# Patient Record
Sex: MALE | Race: WHITE | NOT HISPANIC OR LATINO | Employment: FULL TIME | URBAN - NONMETROPOLITAN AREA
[De-identification: names, ages, dates, MRNs, and addresses within clinical notes are randomized per-mention and may not be internally consistent; named-entity substitution may affect disease eponyms.]

---

## 2017-01-10 PROBLEM — J30.1 ALLERGIC RHINITIS DUE TO POLLEN: Status: ACTIVE | Noted: 2017-01-10

## 2017-01-10 PROBLEM — E55.9 VITAMIN D DEFICIENCY: Status: ACTIVE | Noted: 2017-01-10

## 2017-01-10 PROBLEM — E53.8 VITAMIN B12 DEFICIENCY: Status: ACTIVE | Noted: 2017-01-10

## 2017-01-10 PROBLEM — E78.2 MIXED HYPERLIPIDEMIA: Status: ACTIVE | Noted: 2017-01-10

## 2017-04-26 ENCOUNTER — OFFICE VISIT (OUTPATIENT)
Dept: FAMILY MEDICINE CLINIC | Facility: CLINIC | Age: 47
End: 2017-04-26

## 2017-04-26 VITALS
OXYGEN SATURATION: 100 % | WEIGHT: 132.75 LBS | HEART RATE: 65 BPM | SYSTOLIC BLOOD PRESSURE: 112 MMHG | TEMPERATURE: 97.9 F | DIASTOLIC BLOOD PRESSURE: 77 MMHG | BODY MASS INDEX: 20.18 KG/M2 | RESPIRATION RATE: 16 BRPM

## 2017-04-26 DIAGNOSIS — Z00.00 ROUTINE GENERAL MEDICAL EXAMINATION AT A HEALTH CARE FACILITY: Primary | ICD-10-CM

## 2017-04-26 PROCEDURE — 99396 PREV VISIT EST AGE 40-64: CPT | Performed by: INTERNAL MEDICINE

## 2017-04-26 RX ORDER — LANOLIN ALCOHOL/MO/W.PET/CERES
1000 CREAM (GRAM) TOPICAL DAILY
COMMUNITY
End: 2018-01-26

## 2017-04-26 RX ORDER — LORATADINE 10 MG/1
1 CAPSULE, LIQUID FILLED ORAL DAILY
COMMUNITY

## 2017-04-26 NOTE — PROGRESS NOTES
Subjective   Phani Paulson is a 47 y.o. male.     Chief Complaint   Patient presents with   • Annual Exam       History of Present Illness   HM, Adult Male: The patient is being seen for a health maintenance evaluation and physical exam . The last health maintenance visit was 1 year ago.   General Health: The patient's health is described as good. He denies vision problems. He denies hearing loss. Immunizations status: up to date.   Lifestyle:. He consumes a diverse and healthy diet.   Screening: Cancer screening reviewed and current.   Metabolic screening reviewed and current.   Risk screening reviewed and current.   HPI: Patient is here for a physical exam. He is up-to-date on his immunization. He is up-to-date on his colonoscopy and is due for it this year - see dr lee . He is due for lab work     Review of Systems   Constitutional: Negative for appetite change, fatigue and fever.   HENT: Negative for congestion, ear discharge, ear pain, sinus pressure and sore throat.    Eyes: Negative for pain and discharge.   Respiratory: Negative for cough, shortness of breath and wheezing.    Cardiovascular: Negative for chest pain, palpitations and leg swelling.   Gastrointestinal: Negative for abdominal pain, constipation, diarrhea, nausea and vomiting.   Endocrine: Negative for cold intolerance and heat intolerance.   Genitourinary: Negative for dysuria and flank pain.   Musculoskeletal: Negative for arthralgias and joint swelling.   Skin: Negative for pallor and rash.   Allergic/Immunologic: Negative for environmental allergies and food allergies.   Neurological: Negative for dizziness, weakness and numbness.   Hematological: Negative for adenopathy. Does not bruise/bleed easily.   Psychiatric/Behavioral: Negative for behavioral problems and dysphoric mood. The patient is not nervous/anxious.        Past Medical History:   Diagnosis Date   • Acute maxillary sinusitis    • Acute suppurative otitis media of both ears  without spontaneous rupture of tympanic membranes    • Allergy    • Lesion of skin of face    • Neoplasm of uncertain behavior of skin    • Post varicella        Past Surgical History:   Procedure Laterality Date   • COLONOSCOPY         Family History   Problem Relation Age of Onset   • Colon cancer Mother    • Hypertension Mother    • Skin cancer Father         reports that he has never smoked. He does not have any smokeless tobacco history on file. He reports that he does not drink alcohol.    No Known Allergies        Current Outpatient Prescriptions:   •  Loratadine 10 MG capsule, Take 1 capsule by mouth Daily., Disp: , Rfl:   •  vitamin B-12 (CYANOCOBALAMIN) 1000 MCG tablet, Take 1,000 mcg by mouth Daily., Disp: , Rfl:       Objective   Blood pressure 112/77, pulse 65, temperature 97.9 °F (36.6 °C), temperature source Oral, resp. rate 16, weight 132 lb 12 oz (60.2 kg), SpO2 100 %.    Physical Exam   Constitutional: He is oriented to person, place, and time. He appears well-developed and well-nourished. No distress.   HENT:   Head: Normocephalic and atraumatic.   Right Ear: External ear normal.   Left Ear: External ear normal.   Nose: Nose normal.   Mouth/Throat: Oropharynx is clear and moist.   Eyes: Conjunctivae and EOM are normal. Pupils are equal, round, and reactive to light.   Neck: Normal range of motion. Neck supple. No thyromegaly present.   Cardiovascular: Normal rate, regular rhythm and normal heart sounds.    Pulmonary/Chest: Effort normal. No respiratory distress.   Abdominal: Soft. He exhibits no distension. There is no tenderness. There is no guarding.   Musculoskeletal: Normal range of motion. He exhibits no edema.   Lymphadenopathy:     He has no cervical adenopathy.   Neurological: He is alert and oriented to person, place, and time.   No gross motor or sensory deficits   Skin: Skin is warm and dry. He is not diaphoretic. No erythema.   Psychiatric: He has a normal mood and affect.   Nursing  note and vitals reviewed.        Results for orders placed or performed during the hospital encounter of 04/02/15   Vitamin B12   Result Value Ref Range    Vitamin B-12 944 (H) 239 - 931 pg/mL   Vitamin D 25 hydroxy   Result Value Ref Range    25 Hydroxy, Vitamin D 15.9 (L) 30.0 - 100.0 ng/mL   CK   Result Value Ref Range    Creatine Kinase 50 30 - 170 U/L   Comprehensive metabolic panel   Result Value Ref Range    Sodium 146 (H) 137 - 145 mmol/L    Potassium 4.6 3.5 - 5.1 mmol/L    Chloride 106 98 - 107 mmol/L    CO2 28 26 - 30 mmol/L    Anion Gap 17 10 - 20 mmol/L    BUN 17 7 - 20 mg/dL    Creatinine 1.0 0.6 - 1.3 mg/dL    BUN/Creatinine Ratio 17.3 6.3 - 21.9    eGFR >60 mL/min    Glucose 92 74 - 98 mg/dL    Calcium 9.8 8.4 - 10.2 mg/dL    Total Bilirubin 0.8 0.2 - 1.3 mg/dL    AST (SGOT) 22 15 - 46 U/L    ALT (SGPT) 16 13 - 69 U/L    Total Protein 8.6 (H) 6.3 - 8.2 g/dL    Albumin 5.2 (H) 3.5 - 5.0 g/dL    A/G Ratio 1.5 1.0 - 2.0    Alkaline Phosphatase 74 38 - 126 U/L   Lipid panel   Result Value Ref Range    Total Cholesterol 204 (H) 0 - 199 mg/dL    Triglycerides 53 0 - 149 mg/dL    HDL Cholesterol 57 40 - 60 mg/dL    LDL Cholesterol  136 (H) 0 - 99 mg/dL    VLDL Cholesterol 11 mg/dL   CBC and Differential   Result Value Ref Range    WBC 6.6 4.8 - 10.8 THOUS    RBC 5.97 4.70 - 6.10 m/uL    Hemoglobin 17.4 14.0 - 18.0 g/dL    Hematocrit 52 42 - 52 %    MCV 87.4 80.0 - 94.0 fL    MCH 29.1 27.0 - 31.0 uug    MCHC 33.4 30.0 - 37.0 g/dL    RDW 12.1 11.5 - 14.5 %    Platelets 328 130 - 400 THOUS    Neutrophil Rel % 69.5 37.0 - 80.0 %    Lymphocyte Rel % 16.7 10.0 - 50.0 %    Monocyte Rel % 9.0 0.0 - 12.0 %    Eosinophil Rel % 3.2 0.0 - 7.0 %    Basophil Rel % 1.60 0.00 - 2.50 %    Neutrophils Absolute 4.60 2.00 - 6.90 THOUS    Lymphocytes Absolute 1.10 0.60 - 3.40 THOUS    Monocytes Absolute 0.60 0.00 - 0.90 THOUS    Eosinophils Absolute 0.20 0.00 - 0.70 THOUS    Basophils Absolute 0.10 0.00 - 0.20 THOUS   TSH    Result Value Ref Range    TSH 1.17 0.47 - 4.68 uIU/mL         Assessment/Plan   Phani was seen today for annual exam.    Diagnoses and all orders for this visit:    Routine general medical examination at a health care facility  -     CBC & Differential  -     Comprehensive Metabolic Panel  -     Lipid Panel  -     Hemoglobin A1c  -     TSH  -     Vitamin B12  -     Vitamin D 25 Hydroxy      1. physical exam : conducted, will obtain fasting labs   Impression: health maintenance visit, healthy adult male. Currently, he eats a healthy diet. Colorectal cancer screening: colorectal cancer screening is current and colorectal cancer screening is managed by dr lee. Screening lab work includes glucose, lipid profile and 25-hydroxyvitamin D. The risks and benefits of immunizations were discussed and immunizations are up to date. Advice and education were given regarding nutrition and aerobic exercise. Patient discussion: discussed with the patient.            Annabelle Jarvis MD

## 2017-05-19 LAB
25(OH)D3+25(OH)D2 SERPL-MCNC: 22.5 NG/ML
ALBUMIN SERPL-MCNC: 4.3 G/DL (ref 3.5–5)
ALBUMIN/GLOB SERPL: 1.6 G/DL (ref 1–2)
ALP SERPL-CCNC: 63 U/L (ref 38–126)
ALT SERPL-CCNC: 24 U/L (ref 13–69)
AST SERPL-CCNC: 19 U/L (ref 15–46)
BASOPHILS # BLD AUTO: 0.09 10*3/MM3 (ref 0–0.2)
BASOPHILS NFR BLD AUTO: 1.5 % (ref 0–2.5)
BILIRUB SERPL-MCNC: 0.9 MG/DL (ref 0.2–1.3)
BUN SERPL-MCNC: 14 MG/DL (ref 7–20)
BUN/CREAT SERPL: 14 (ref 6.3–21.9)
CALCIUM SERPL-MCNC: 9.7 MG/DL (ref 8.4–10.2)
CHLORIDE SERPL-SCNC: 106 MMOL/L (ref 98–107)
CHOLEST SERPL-MCNC: 170 MG/DL (ref 0–199)
CO2 SERPL-SCNC: 25 MMOL/L (ref 26–30)
CREAT SERPL-MCNC: 1 MG/DL (ref 0.6–1.3)
EOSINOPHIL # BLD AUTO: 0.2 10*3/MM3 (ref 0–0.7)
EOSINOPHIL NFR BLD AUTO: 3.3 % (ref 0–7)
ERYTHROCYTE [DISTWIDTH] IN BLOOD BY AUTOMATED COUNT: 12.3 % (ref 11.5–14.5)
GLOBULIN SER CALC-MCNC: 2.7 GM/DL
GLUCOSE SERPL-MCNC: 94 MG/DL (ref 74–98)
HBA1C MFR BLD: 4.8 %
HCT VFR BLD AUTO: 48.9 % (ref 42–52)
HDLC SERPL-MCNC: 55 MG/DL (ref 40–60)
HGB BLD-MCNC: 16.7 G/DL (ref 14–18)
IMM GRANULOCYTES # BLD: 0.02 10*3/MM3 (ref 0–0.06)
IMM GRANULOCYTES NFR BLD: 0.3 % (ref 0–0.6)
LDLC SERPL CALC-MCNC: 102 MG/DL (ref 0–99)
LYMPHOCYTES # BLD AUTO: 1.12 10*3/MM3 (ref 0.6–3.4)
LYMPHOCYTES NFR BLD AUTO: 18.3 % (ref 10–50)
MCH RBC QN AUTO: 30 PG (ref 27–31)
MCHC RBC AUTO-ENTMCNC: 34.2 G/DL (ref 30–37)
MCV RBC AUTO: 87.9 FL (ref 80–94)
MONOCYTES # BLD AUTO: 0.65 10*3/MM3 (ref 0–0.9)
MONOCYTES NFR BLD AUTO: 10.6 % (ref 0–12)
NEUTROPHILS # BLD AUTO: 4.03 10*3/MM3 (ref 2–6.9)
NEUTROPHILS NFR BLD AUTO: 66 % (ref 37–80)
NRBC BLD AUTO-RTO: 0 /100 WBC (ref 0–0)
PLATELET # BLD AUTO: 305 10*3/MM3 (ref 130–400)
POTASSIUM SERPL-SCNC: 4.3 MMOL/L (ref 3.5–5.1)
PROT SERPL-MCNC: 7 G/DL (ref 6.3–8.2)
RBC # BLD AUTO: 5.56 10*6/MM3 (ref 4.7–6.1)
SODIUM SERPL-SCNC: 141 MMOL/L (ref 137–145)
TRIGL SERPL-MCNC: 67 MG/DL
TSH SERPL DL<=0.005 MIU/L-ACNC: 1.03 MIU/ML (ref 0.47–4.68)
VIT B12 SERPL-MCNC: 942 PG/ML (ref 239–931)
VLDLC SERPL CALC-MCNC: 13.4 MG/DL
WBC # BLD AUTO: 6.11 10*3/MM3 (ref 4.8–10.8)

## 2017-05-23 RX ORDER — ERGOCALCIFEROL 1.25 MG/1
50000 CAPSULE ORAL WEEKLY
Qty: 8 CAPSULE | Refills: 0 | Status: SHIPPED | OUTPATIENT
Start: 2017-05-23 | End: 2017-07-12

## 2017-10-05 ENCOUNTER — TELEPHONE (OUTPATIENT)
Dept: FAMILY MEDICINE CLINIC | Facility: CLINIC | Age: 47
End: 2017-10-05

## 2017-10-05 RX ORDER — CEPHALEXIN 500 MG/1
500 CAPSULE ORAL 3 TIMES DAILY
Qty: 30 CAPSULE | Refills: 0 | Status: SHIPPED | OUTPATIENT
Start: 2017-10-05 | End: 2017-10-15

## 2017-11-10 ENCOUNTER — TELEPHONE (OUTPATIENT)
Dept: GASTROENTEROLOGY | Facility: CLINIC | Age: 47
End: 2017-11-10

## 2017-11-16 ENCOUNTER — LAB REQUISITION (OUTPATIENT)
Dept: LAB | Facility: HOSPITAL | Age: 47
End: 2017-11-16

## 2017-11-16 ENCOUNTER — OUTSIDE FACILITY SERVICE (OUTPATIENT)
Dept: GASTROENTEROLOGY | Facility: CLINIC | Age: 47
End: 2017-11-16

## 2017-11-16 DIAGNOSIS — Z80.0 FAMILY HISTORY OF MALIGNANT NEOPLASM OF DIGESTIVE ORGAN: ICD-10-CM

## 2017-11-16 PROCEDURE — 45385 COLONOSCOPY W/LESION REMOVAL: CPT | Performed by: INTERNAL MEDICINE

## 2017-11-16 PROCEDURE — 88305 TISSUE EXAM BY PATHOLOGIST: CPT | Performed by: INTERNAL MEDICINE

## 2017-11-17 LAB
CYTO UR: NORMAL
LAB AP CASE REPORT: NORMAL
LAB AP CLINICAL INFORMATION: NORMAL
Lab: NORMAL
PATH REPORT.FINAL DX SPEC: NORMAL
PATH REPORT.GROSS SPEC: NORMAL

## 2018-01-26 ENCOUNTER — OFFICE VISIT (OUTPATIENT)
Dept: INTERNAL MEDICINE | Facility: CLINIC | Age: 48
End: 2018-01-26

## 2018-01-26 VITALS
HEART RATE: 86 BPM | SYSTOLIC BLOOD PRESSURE: 110 MMHG | OXYGEN SATURATION: 98 % | DIASTOLIC BLOOD PRESSURE: 68 MMHG | HEIGHT: 66 IN | TEMPERATURE: 98.7 F | BODY MASS INDEX: 21.69 KG/M2 | WEIGHT: 135 LBS

## 2018-01-26 DIAGNOSIS — J10.1 INFLUENZA A: Primary | ICD-10-CM

## 2018-01-26 LAB
EXPIRATION DATE: ABNORMAL
FLUAV AG NPH QL: POSITIVE
FLUBV AG NPH QL: NEGATIVE
INTERNAL CONTROL: ABNORMAL
Lab: ABNORMAL

## 2018-01-26 PROCEDURE — 87804 INFLUENZA ASSAY W/OPTIC: CPT | Performed by: PHYSICIAN ASSISTANT

## 2018-01-26 PROCEDURE — 99213 OFFICE O/P EST LOW 20 MIN: CPT | Performed by: PHYSICIAN ASSISTANT

## 2018-01-26 RX ORDER — OSELTAMIVIR PHOSPHATE 75 MG/1
75 CAPSULE ORAL 2 TIMES DAILY
Qty: 10 CAPSULE | Refills: 0 | Status: SHIPPED | OUTPATIENT
Start: 2018-01-26 | End: 2018-01-31

## 2018-01-26 NOTE — PROGRESS NOTES
Phani Paulson is a 47 y.o. male.     Subjective   History of Present Illness   Here today with concern of about 24 hours of nasal congestion, postnasal drip, rhinorrhea, cough, chills, sweats, headache, ear ache and sore throat.  He is unsure if he had a fever. Denies body aches or SOA. Advil helps his headaches some. He had an influenza vaccine this year.         The following portions of the patient's history were reviewed and updated as appropriate: allergies, current medications, past family history, past medical history, past social history, past surgical history and problem list.    Review of Systems    Constitutional: chills, sweats. Negative for appetite change, fatigue, fever and unexpected weight change.   HENT: postnasal drip, rhinorrhea, sore throat, congestion, ear pain.  Negative for hearing loss, nosebleeds, tinnitus and trouble swallowing.    Eyes: Negative for photophobia, discharge and visual disturbance.   Respiratory: cough. Negative for chest tightness, shortness of breath and wheezing.    Cardiovascular: Negative for chest pain, palpitations and leg swelling.   Gastrointestinal: Negative for abdominal distention, abdominal pain, blood in stool, constipation, diarrhea, nausea and vomiting.   Endocrine: Negative for cold intolerance, heat intolerance, polydipsia, polyphagia and polyuria.   Musculoskeletal: Negative for arthralgias, back pain, joint swelling, myalgias, neck pain and neck stiffness.   Skin: Negative for color change, pallor, rash and wound.   Allergic/Immunologic: Negative for environmental allergies, food allergies and immunocompromised state.   Neurological: headaches. Negative for dizziness, tremors, seizures, weakness, numbness.  Hematological: Negative for adenopathy. Does not bruise/bleed easily.   Psychiatric/Behavioral: Negative for sleep disturbances, agitation, behavioral problems, confusion, hallucinations, self-injury and suicidal ideas. The patient is not  "nervous/anxious.      Objective    Physical Exam  Constitutional: Oriented to person, place, and time. Appears well-developed and well-nourished.   HENT: OP normal. TMs normal.   Head: little maxillary and frontal sinus pressure. Normocephalic and atraumatic.   Eyes: EOM are normal. Pupils are equal, round, and reactive to light.   Neck: Normal range of motion. Neck supple.   Cardiovascular: Normal rate, regular rhythm and normal heart sounds.    Pulmonary/Chest: Effort normal and breath sounds normal. No respiratory distress.  Has no wheezes or rales. Exhibits no chest wall tenderness.   Abdominal: Soft. Bowel sounds are normal. Exhibits no distension and no mass. There is no tenderness.   Musculoskeletal: Normal range of motion. Exhibits no tenderness.   Neurological: Alert and oriented to person, place, and time.   Skin: Skin is warm and dry.   Psychiatric: Has a normal mood and affect. Behavior is normal. Judgment and thought content normal.       /68  Pulse 86  Temp 98.7 °F (37.1 °C)  Ht 167.6 cm (66\")  Wt 61.2 kg (135 lb)  SpO2 98%  BMI 21.79 kg/m2    Nursing note and vitals reviewed.        Assessment/Plan   Phani was seen today for sinus problem, cough, earache, headache and sore throat.    Diagnoses and all orders for this visit:    Influenza A  -     POC Influenza A / B- positive A  -     oseltamivir (TAMIFLU) 75 MG capsule; Take 1 capsule by mouth 2 (Two) Times a Day for 5 days.    Increase PO fluid intake. Ibuprofen and/or tylenol prn.     Call or RTC if symptoms worsen or persist.          "

## 2018-05-02 ENCOUNTER — OFFICE VISIT (OUTPATIENT)
Dept: INTERNAL MEDICINE | Facility: CLINIC | Age: 48
End: 2018-05-02

## 2018-05-02 VITALS
HEART RATE: 60 BPM | SYSTOLIC BLOOD PRESSURE: 121 MMHG | TEMPERATURE: 98.3 F | DIASTOLIC BLOOD PRESSURE: 84 MMHG | RESPIRATION RATE: 15 BRPM | WEIGHT: 132 LBS | BODY MASS INDEX: 21.31 KG/M2 | OXYGEN SATURATION: 100 %

## 2018-05-02 DIAGNOSIS — Z00.00 ROUTINE GENERAL MEDICAL EXAMINATION AT A HEALTH CARE FACILITY: Primary | ICD-10-CM

## 2018-05-02 PROCEDURE — 99396 PREV VISIT EST AGE 40-64: CPT | Performed by: INTERNAL MEDICINE

## 2018-05-02 NOTE — PROGRESS NOTES
Subjective   Phani Paulson is a 48 y.o. male.     Chief Complaint   Patient presents with   • Annual Exam       History of Present Illness    patient is here for a physical and is due for labs , he was seen by GI and has had a colonoscopy     Review of Systems   Constitutional: Negative for appetite change, fatigue and fever.   HENT: Negative for congestion, ear discharge, ear pain, sinus pressure and sore throat.    Eyes: Negative for pain and discharge.   Respiratory: Negative for cough, shortness of breath and wheezing.    Cardiovascular: Negative for chest pain, palpitations and leg swelling.   Gastrointestinal: Negative for abdominal pain, constipation, diarrhea, nausea and vomiting.   Endocrine: Negative for cold intolerance and heat intolerance.   Genitourinary: Negative for dysuria and flank pain.   Musculoskeletal: Negative for arthralgias and joint swelling.   Skin: Negative for pallor and rash.   Allergic/Immunologic: Negative for environmental allergies and food allergies.   Neurological: Negative for dizziness, weakness and numbness.   Hematological: Negative for adenopathy. Does not bruise/bleed easily.   Psychiatric/Behavioral: Negative for behavioral problems and dysphoric mood. The patient is not nervous/anxious.        Past Medical History:   Diagnosis Date   • Acute maxillary sinusitis    • Acute suppurative otitis media of both ears without spontaneous rupture of tympanic membranes    • Allergy    • Lesion of skin of face    • Neoplasm of uncertain behavior of skin    • Post varicella        Past Surgical History:   Procedure Laterality Date   • COLONOSCOPY         Family History   Problem Relation Age of Onset   • Colon cancer Mother    • Hypertension Mother    • Skin cancer Father         reports that he has never smoked. He does not have any smokeless tobacco history on file. He reports that he does not drink alcohol.    No Known Allergies        Current Outpatient Prescriptions:    •  Loratadine 10 MG capsule, Take 1 capsule by mouth Daily., Disp: , Rfl:       Objective   Blood pressure 121/84, pulse 60, temperature 98.3 °F (36.8 °C), temperature source Oral, resp. rate 15, weight 59.9 kg (132 lb), SpO2 100 %.    Physical Exam   Constitutional: He is oriented to person, place, and time. He appears well-developed and well-nourished. No distress.   HENT:   Head: Normocephalic and atraumatic.   Right Ear: External ear normal.   Left Ear: External ear normal.   Nose: Nose normal.   Mouth/Throat: Oropharynx is clear and moist.   Eyes: Conjunctivae and EOM are normal. Pupils are equal, round, and reactive to light.   Neck: Normal range of motion. Neck supple. No thyromegaly present.   Cardiovascular: Normal rate, regular rhythm and normal heart sounds.    Pulmonary/Chest: Effort normal. No respiratory distress.   Abdominal: Soft. He exhibits no distension. There is no tenderness. There is no guarding.   Musculoskeletal: Normal range of motion. He exhibits no edema.   Lymphadenopathy:     He has no cervical adenopathy.   Neurological: He is alert and oriented to person, place, and time.   No gross motor or sensory deficits   Skin: Skin is warm and dry. He is not diaphoretic. No erythema.   Psychiatric: He has a normal mood and affect.   Nursing note and vitals reviewed.        Results for orders placed or performed in visit on 01/26/18   POC Influenza A / B   Result Value Ref Range    Rapid Influenza A Ag positive     Rapid Influenza B Ag negative     Internal Control Passed Passed    Lot Number [id number]     Expiration Date 9/30/19          Assessment/Plan   Phani was seen today for annual exam.    Diagnoses and all orders for this visit:    Routine general medical examination at a health care facility  -     CBC & Differential  -     Lipid Panel  -     Comprehensive Metabolic Panel  -     Hemoglobin A1c  -     Vitamin B12  -     Vitamin D 25 Hydroxy        Plan:  1.physical: Physical exam  conducted today,  obtain fasting lab work,    Currently,  patient  eats a healthy diet. Screening lab work includes glucose, lipid profile and 25-hydroxyvitamin D. The risks and benefits of immunizations were discussed and immunizations are up to date. Advice and education were given regarding nutrition and aerobic exercise. Patient discussion: discussed with the patient.   Medication Reviewed and medication list updated         Annabelle Jarvis MD

## 2018-05-11 LAB
25(OH)D3+25(OH)D2 SERPL-MCNC: 18.1 NG/ML
ALBUMIN SERPL-MCNC: 4.4 G/DL (ref 3.5–5)
ALBUMIN/GLOB SERPL: 1.5 G/DL (ref 1–2)
ALP SERPL-CCNC: 77 U/L (ref 38–126)
ALT SERPL-CCNC: 17 U/L (ref 13–69)
AST SERPL-CCNC: 22 U/L (ref 15–46)
BASOPHILS # BLD AUTO: 0.12 10*3/MM3 (ref 0–0.2)
BASOPHILS NFR BLD AUTO: 2.1 % (ref 0–2.5)
BILIRUB SERPL-MCNC: 0.7 MG/DL (ref 0.2–1.3)
BUN SERPL-MCNC: 17 MG/DL (ref 7–20)
BUN/CREAT SERPL: 17 (ref 6.3–21.9)
CALCIUM SERPL-MCNC: 9.6 MG/DL (ref 8.4–10.2)
CHLORIDE SERPL-SCNC: 105 MMOL/L (ref 98–107)
CHOLEST SERPL-MCNC: 161 MG/DL (ref 0–199)
CO2 SERPL-SCNC: 27 MMOL/L (ref 26–30)
CREAT SERPL-MCNC: 1 MG/DL (ref 0.6–1.3)
EOSINOPHIL # BLD AUTO: 0.47 10*3/MM3 (ref 0–0.7)
EOSINOPHIL NFR BLD AUTO: 8.4 % (ref 0–7)
ERYTHROCYTE [DISTWIDTH] IN BLOOD BY AUTOMATED COUNT: 12.6 % (ref 11.5–14.5)
GFR SERPLBLD CREATININE-BSD FMLA CKD-EPI: 80 ML/MIN/1.73
GFR SERPLBLD CREATININE-BSD FMLA CKD-EPI: 97 ML/MIN/1.73
GLOBULIN SER CALC-MCNC: 3 GM/DL
GLUCOSE SERPL-MCNC: 90 MG/DL (ref 74–98)
HBA1C MFR BLD: 5 %
HCT VFR BLD AUTO: 50.1 % (ref 42–52)
HDLC SERPL-MCNC: 54 MG/DL (ref 40–60)
HGB BLD-MCNC: 17 G/DL (ref 14–18)
IMM GRANULOCYTES # BLD: 0.02 10*3/MM3 (ref 0–0.06)
IMM GRANULOCYTES NFR BLD: 0.4 % (ref 0–0.6)
LDLC SERPL CALC-MCNC: 91 MG/DL (ref 0–99)
LYMPHOCYTES # BLD AUTO: 1.16 10*3/MM3 (ref 0.6–3.4)
LYMPHOCYTES NFR BLD AUTO: 20.7 % (ref 10–50)
MCH RBC QN AUTO: 30.2 PG (ref 27–31)
MCHC RBC AUTO-ENTMCNC: 33.9 G/DL (ref 30–37)
MCV RBC AUTO: 89 FL (ref 80–94)
MONOCYTES # BLD AUTO: 0.56 10*3/MM3 (ref 0–0.9)
MONOCYTES NFR BLD AUTO: 10 % (ref 0–12)
NEUTROPHILS # BLD AUTO: 3.28 10*3/MM3 (ref 2–6.9)
NEUTROPHILS NFR BLD AUTO: 58.4 % (ref 37–80)
NRBC BLD AUTO-RTO: 0 /100 WBC (ref 0–0)
PLATELET # BLD AUTO: 300 10*3/MM3 (ref 130–400)
POTASSIUM SERPL-SCNC: 4.9 MMOL/L (ref 3.5–5.1)
PROT SERPL-MCNC: 7.4 G/DL (ref 6.3–8.2)
RBC # BLD AUTO: 5.63 10*6/MM3 (ref 4.7–6.1)
SODIUM SERPL-SCNC: 142 MMOL/L (ref 137–145)
TRIGL SERPL-MCNC: 82 MG/DL
VIT B12 SERPL-MCNC: 373 PG/ML (ref 239–931)
VLDLC SERPL CALC-MCNC: 16.4 MG/DL
WBC # BLD AUTO: 5.61 10*3/MM3 (ref 4.8–10.8)

## 2018-05-14 RX ORDER — ERGOCALCIFEROL 1.25 MG/1
50000 CAPSULE ORAL WEEKLY
Qty: 8 CAPSULE | Refills: 0 | Status: SHIPPED | OUTPATIENT
Start: 2018-05-14 | End: 2019-05-06

## 2018-08-13 RX ORDER — ERGOCALCIFEROL 1.25 MG/1
CAPSULE ORAL
Qty: 4 CAPSULE | Refills: 0 | OUTPATIENT
Start: 2018-08-13

## 2019-05-06 ENCOUNTER — OFFICE VISIT (OUTPATIENT)
Dept: INTERNAL MEDICINE | Facility: CLINIC | Age: 49
End: 2019-05-06

## 2019-05-06 VITALS
OXYGEN SATURATION: 98 % | WEIGHT: 135 LBS | TEMPERATURE: 98 F | BODY MASS INDEX: 21.69 KG/M2 | DIASTOLIC BLOOD PRESSURE: 88 MMHG | RESPIRATION RATE: 16 BRPM | HEART RATE: 65 BPM | SYSTOLIC BLOOD PRESSURE: 133 MMHG | HEIGHT: 66 IN

## 2019-05-06 DIAGNOSIS — Z00.00 ROUTINE GENERAL MEDICAL EXAMINATION AT A HEALTH CARE FACILITY: Primary | ICD-10-CM

## 2019-05-06 PROCEDURE — 99396 PREV VISIT EST AGE 40-64: CPT | Performed by: INTERNAL MEDICINE

## 2019-05-06 RX ORDER — MELATONIN
1000 DAILY
COMMUNITY
End: 2020-01-25

## 2019-05-06 NOTE — PROGRESS NOTES
Subjective   Phani Paulson is a 49 y.o. male.     Chief Complaint   Patient presents with   • Annual Exam       History of Present Illness   Patient is here for a physical and due for labs , he got hep a shot - first dose     Review of Systems   Constitutional: Negative for appetite change, fatigue and fever.   HENT: Negative for congestion, ear discharge, ear pain, sinus pressure and sore throat.    Eyes: Negative for pain and discharge.   Respiratory: Negative for cough, shortness of breath and wheezing.    Cardiovascular: Negative for chest pain, palpitations and leg swelling.   Gastrointestinal: Negative for abdominal pain, constipation, diarrhea, nausea and vomiting.   Endocrine: Negative for cold intolerance and heat intolerance.   Genitourinary: Negative for dysuria and flank pain.   Musculoskeletal: Negative for arthralgias and joint swelling.   Skin: Negative for pallor and rash.   Allergic/Immunologic: Negative for environmental allergies and food allergies.   Neurological: Negative for dizziness, weakness and numbness.   Hematological: Negative for adenopathy. Does not bruise/bleed easily.   Psychiatric/Behavioral: Negative for behavioral problems and dysphoric mood. The patient is not nervous/anxious.        Past Medical History:   Diagnosis Date   • Acute maxillary sinusitis    • Acute suppurative otitis media of both ears without spontaneous rupture of tympanic membranes    • Allergy    • Lesion of skin of face    • Neoplasm of uncertain behavior of skin    • Post varicella        Past Surgical History:   Procedure Laterality Date   • COLONOSCOPY         Family History   Problem Relation Age of Onset   • Colon cancer Mother    • Hypertension Mother    • Skin cancer Father         reports that he has never smoked. He does not have any smokeless tobacco history on file. He reports that he does not drink alcohol.    No Known Allergies        Current Outpatient Medications:   •  cholecalciferol  "(VITAMIN D3) 1000 units tablet, Take 1,000 Units by mouth Daily., Disp: , Rfl:   •  Loratadine 10 MG capsule, Take 1 capsule by mouth Daily., Disp: , Rfl:       Objective   Blood pressure 133/88, pulse 65, temperature 98 °F (36.7 °C), resp. rate 16, height 167.6 cm (66\"), weight 61.2 kg (135 lb), SpO2 98 %.    Physical Exam   Constitutional: He is oriented to person, place, and time. He appears well-developed and well-nourished. No distress.   HENT:   Head: Normocephalic and atraumatic.   Right Ear: External ear normal.   Left Ear: External ear normal.   Nose: Nose normal.   Mouth/Throat: Oropharynx is clear and moist.   Eyes: Conjunctivae and EOM are normal. Pupils are equal, round, and reactive to light.   Neck: Normal range of motion. Neck supple. No thyromegaly present.   Cardiovascular: Normal rate, regular rhythm and normal heart sounds.   Pulmonary/Chest: Effort normal. No respiratory distress.   Abdominal: Soft. He exhibits no distension. There is no tenderness. There is no guarding.   Musculoskeletal: Normal range of motion. He exhibits no edema.   Lymphadenopathy:     He has no cervical adenopathy.   Neurological: He is alert and oriented to person, place, and time.   No gross motor or sensory deficits   Skin: Skin is warm and dry. He is not diaphoretic. No erythema.   Psychiatric: He has a normal mood and affect.   Nursing note and vitals reviewed.      Patient's Body mass index is 21.79 kg/m². BMI is within normal parameters. No follow-up required..      Results for orders placed or performed in visit on 05/02/18   Lipid Panel   Result Value Ref Range    Total Cholesterol 161 0 - 199 mg/dL    Triglycerides 82 <150 mg/dL    HDL Cholesterol 54 40 - 60 mg/dL    VLDL Cholesterol 16.4 mg/dL    LDL Cholesterol  91 0 - 99 mg/dL   Comprehensive Metabolic Panel   Result Value Ref Range    Glucose 90 74 - 98 mg/dL    BUN 17 7 - 20 mg/dL    Creatinine 1.00 0.60 - 1.30 mg/dL    eGFR Non African Am 80 >60 mL/min/1.73 "    eGFR African Am 97 >60 mL/min/1.73    BUN/Creatinine Ratio 17.0 6.3 - 21.9    Sodium 142 137 - 145 mmol/L    Potassium 4.9 3.5 - 5.1 mmol/L    Chloride 105 98 - 107 mmol/L    Total CO2 27.0 26.0 - 30.0 mmol/L    Calcium 9.6 8.4 - 10.2 mg/dL    Total Protein 7.4 6.3 - 8.2 g/dL    Albumin 4.40 3.50 - 5.00 g/dL    Globulin 3.0 gm/dL    A/G Ratio 1.5 1.0 - 2.0 g/dL    Total Bilirubin 0.7 0.2 - 1.3 mg/dL    Alkaline Phosphatase 77 38 - 126 U/L    AST (SGOT) 22 15 - 46 U/L    ALT (SGPT) 17 13 - 69 U/L   Hemoglobin A1c   Result Value Ref Range    Hemoglobin A1C 5.00 %   Vitamin B12   Result Value Ref Range    Vitamin B-12 373 239 - 931 pg/mL   Vitamin D 25 Hydroxy   Result Value Ref Range    25 Hydroxy, Vitamin D 18.1 ng/ml   CBC & Differential   Result Value Ref Range    WBC 5.61 4.80 - 10.80 10*3/mm3    RBC 5.63 4.70 - 6.10 10*6/mm3    Hemoglobin 17.0 14.0 - 18.0 g/dL    Hematocrit 50.1 42.0 - 52.0 %    MCV 89.0 80.0 - 94.0 fL    MCH 30.2 27.0 - 31.0 pg    MCHC 33.9 30.0 - 37.0 g/dL    RDW 12.6 11.5 - 14.5 %    Platelets 300 130 - 400 10*3/mm3    Neutrophil Rel % 58.4 37.0 - 80.0 %    Lymphocyte Rel % 20.7 10.0 - 50.0 %    Monocyte Rel % 10.0 0.0 - 12.0 %    Eosinophil Rel % 8.4 (H) 0.0 - 7.0 %    Basophil Rel % 2.1 0.0 - 2.5 %    Neutrophils Absolute 3.28 2.00 - 6.90 10*3/mm3    Lymphocytes Absolute 1.16 0.60 - 3.40 10*3/mm3    Monocytes Absolute 0.56 0.00 - 0.90 10*3/mm3    Eosinophils Absolute 0.47 0.00 - 0.70 10*3/mm3    Basophils Absolute 0.12 0.00 - 0.20 10*3/mm3    Immature Granulocyte Rel % 0.4 0.0 - 0.6 %    Immature Grans Absolute 0.02 0.00 - 0.06 10*3/mm3    nRBC 0.0 0.0 - 0.0 /100 WBC         Assessment/Plan   Phani was seen today for annual exam.    Diagnoses and all orders for this visit:    Routine general medical examination at a health care facility  -     CBC & Differential  -     Comprehensive Metabolic Panel  -     Lipid Panel  -     Vitamin B12  -     Vitamin D 25 Hydroxy  -     Hemoglobin  A1c        plan:  1.physical: Physical exam conducted today,  Will obtain fasting lab work,   Impression: healthy adult Patient. Currently, patient eats a healthy diet. Screening lab work includes glucose, lipid profile , complete blood count and comprehensive panel . The risks and benefits of immunizations were discussed and immunizations are up to date. Advice and education were given regarding nutrition and aerobic exercise. Patient discussion: discussed with the patient.  Physical with preventive exam as well as age and risk appropriate counseling completed.   Patient Discussion: plan discussed with the patient, follow-up visit needed in one year. Medication Review and medication list updated           Annabelle Jarvis MD

## 2019-08-30 ENCOUNTER — APPOINTMENT (OUTPATIENT)
Dept: LAB | Facility: HOSPITAL | Age: 49
End: 2019-08-30

## 2019-08-30 LAB
25(OH)D3 SERPL-MCNC: 23.3 NG/ML (ref 30–100)
ALBUMIN SERPL-MCNC: 4.8 G/DL (ref 3.5–5.2)
ALBUMIN/GLOB SERPL: 1.9 G/DL
ALP SERPL-CCNC: 68 U/L (ref 39–117)
ALT SERPL W P-5'-P-CCNC: 9 U/L (ref 1–41)
ANION GAP SERPL CALCULATED.3IONS-SCNC: 12.4 MMOL/L (ref 5–15)
AST SERPL-CCNC: 14 U/L (ref 1–40)
BASOPHILS # BLD AUTO: 0.12 10*3/MM3 (ref 0–0.2)
BASOPHILS NFR BLD AUTO: 2 % (ref 0–1.5)
BILIRUB SERPL-MCNC: 0.5 MG/DL (ref 0.2–1.2)
BUN BLD-MCNC: 9 MG/DL (ref 6–20)
BUN/CREAT SERPL: 8 (ref 7–25)
CALCIUM SPEC-SCNC: 9.4 MG/DL (ref 8.6–10.5)
CHLORIDE SERPL-SCNC: 102 MMOL/L (ref 98–107)
CHOLEST SERPL-MCNC: 162 MG/DL (ref 0–200)
CO2 SERPL-SCNC: 25.6 MMOL/L (ref 22–29)
CREAT BLD-MCNC: 1.13 MG/DL (ref 0.76–1.27)
DEPRECATED RDW RBC AUTO: 42 FL (ref 37–54)
EOSINOPHIL # BLD AUTO: 0.3 10*3/MM3 (ref 0–0.4)
EOSINOPHIL NFR BLD AUTO: 5 % (ref 0.3–6.2)
ERYTHROCYTE [DISTWIDTH] IN BLOOD BY AUTOMATED COUNT: 12.8 % (ref 12.3–15.4)
GFR SERPL CREATININE-BSD FRML MDRD: 70 ML/MIN/1.73
GLOBULIN UR ELPH-MCNC: 2.5 GM/DL
GLUCOSE BLD-MCNC: 96 MG/DL (ref 65–99)
HBA1C MFR BLD: 5.09 % (ref 4.8–5.6)
HCT VFR BLD AUTO: 52.1 % (ref 37.5–51)
HDLC SERPL-MCNC: 50 MG/DL (ref 40–60)
HGB BLD-MCNC: 17 G/DL (ref 13–17.7)
IMM GRANULOCYTES # BLD AUTO: 0.01 10*3/MM3 (ref 0–0.05)
IMM GRANULOCYTES NFR BLD AUTO: 0.2 % (ref 0–0.5)
LDLC SERPL CALC-MCNC: 96 MG/DL (ref 0–100)
LDLC/HDLC SERPL: 1.92 {RATIO}
LYMPHOCYTES # BLD AUTO: 1.39 10*3/MM3 (ref 0.7–3.1)
LYMPHOCYTES NFR BLD AUTO: 23.1 % (ref 19.6–45.3)
MCH RBC QN AUTO: 29.4 PG (ref 26.6–33)
MCHC RBC AUTO-ENTMCNC: 32.6 G/DL (ref 31.5–35.7)
MCV RBC AUTO: 90.1 FL (ref 79–97)
MONOCYTES # BLD AUTO: 0.67 10*3/MM3 (ref 0.1–0.9)
MONOCYTES NFR BLD AUTO: 11.1 % (ref 5–12)
NEUTROPHILS # BLD AUTO: 3.52 10*3/MM3 (ref 1.7–7)
NEUTROPHILS NFR BLD AUTO: 58.6 % (ref 42.7–76)
NRBC BLD AUTO-RTO: 0 /100 WBC (ref 0–0.2)
PLATELET # BLD AUTO: 287 10*3/MM3 (ref 140–450)
PMV BLD AUTO: 11.2 FL (ref 6–12)
POTASSIUM BLD-SCNC: 4.7 MMOL/L (ref 3.5–5.2)
PROT SERPL-MCNC: 7.3 G/DL (ref 6–8.5)
RBC # BLD AUTO: 5.78 10*6/MM3 (ref 4.14–5.8)
SODIUM BLD-SCNC: 140 MMOL/L (ref 136–145)
TRIGL SERPL-MCNC: 81 MG/DL (ref 0–150)
VIT B12 BLD-MCNC: 411 PG/ML (ref 211–946)
VLDLC SERPL-MCNC: 16.2 MG/DL (ref 5–40)
WBC NRBC COR # BLD: 6.01 10*3/MM3 (ref 3.4–10.8)

## 2019-08-30 PROCEDURE — 83036 HEMOGLOBIN GLYCOSYLATED A1C: CPT | Performed by: INTERNAL MEDICINE

## 2019-08-30 PROCEDURE — 85025 COMPLETE CBC W/AUTO DIFF WBC: CPT | Performed by: INTERNAL MEDICINE

## 2019-08-30 PROCEDURE — 80061 LIPID PANEL: CPT | Performed by: INTERNAL MEDICINE

## 2019-08-30 PROCEDURE — 36415 COLL VENOUS BLD VENIPUNCTURE: CPT | Performed by: INTERNAL MEDICINE

## 2019-08-30 PROCEDURE — 80053 COMPREHEN METABOLIC PANEL: CPT | Performed by: INTERNAL MEDICINE

## 2019-08-30 PROCEDURE — 82607 VITAMIN B-12: CPT | Performed by: INTERNAL MEDICINE

## 2019-08-30 PROCEDURE — 82306 VITAMIN D 25 HYDROXY: CPT | Performed by: INTERNAL MEDICINE

## 2019-09-02 RX ORDER — ERGOCALCIFEROL 1.25 MG/1
50000 CAPSULE ORAL WEEKLY
Qty: 8 CAPSULE | Refills: 0 | Status: SHIPPED | OUTPATIENT
Start: 2019-09-02 | End: 2020-01-25

## 2019-12-09 ENCOUNTER — HOSPITAL ENCOUNTER (EMERGENCY)
Facility: HOSPITAL | Age: 49
Discharge: LEFT BEFORE DISCHARGE | End: 2019-12-09

## 2019-12-09 VITALS
TEMPERATURE: 97.9 F | SYSTOLIC BLOOD PRESSURE: 129 MMHG | OXYGEN SATURATION: 97 % | WEIGHT: 137 LBS | BODY MASS INDEX: 22.02 KG/M2 | RESPIRATION RATE: 18 BRPM | HEIGHT: 66 IN | HEART RATE: 73 BPM | DIASTOLIC BLOOD PRESSURE: 90 MMHG

## 2020-01-25 ENCOUNTER — OFFICE VISIT (OUTPATIENT)
Dept: INTERNAL MEDICINE | Facility: CLINIC | Age: 50
End: 2020-01-25

## 2020-01-25 VITALS
TEMPERATURE: 97.8 F | WEIGHT: 133 LBS | RESPIRATION RATE: 16 BRPM | BODY MASS INDEX: 21.38 KG/M2 | SYSTOLIC BLOOD PRESSURE: 137 MMHG | DIASTOLIC BLOOD PRESSURE: 85 MMHG | OXYGEN SATURATION: 98 % | HEART RATE: 69 BPM | HEIGHT: 66 IN

## 2020-01-25 DIAGNOSIS — M79.641 RIGHT HAND PAIN: Primary | ICD-10-CM
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
## 2020-01-25 PROCEDURE — 99214 OFFICE O/P EST MOD 30 MIN: CPT | Performed by: INTERNAL MEDICINE

## 2020-01-25 NOTE — PROGRESS NOTES
Subjective   Phani Paulson is a 49 y.o. male.     Chief Complaint   Patient presents with   • Hand Pain     right   • Fall       History of Present Illness    patient is here complaining of pain in the right hand in the metacrpal area of 4th/5th digit  And cannot make a grasp ,  He fell off a ladder  And tried to brace himself , he went to the urgent care  And the records have been reviewed but he didn't have much pain in his had at that time  Ans the pain was mostly in the chest area    The following portions of the patient's history were reviewed and updated as appropriate: allergies, current medications, past family history, past medical history, past social history, past surgical history and problem list.    Review of Systems   Constitutional: Negative for appetite change, fatigue and fever.   HENT: Negative for congestion, ear discharge, ear pain, sinus pressure and sore throat.    Eyes: Negative for pain and discharge.   Respiratory: Negative for cough, shortness of breath and wheezing.    Cardiovascular: Negative for chest pain, palpitations and leg swelling.   Gastrointestinal: Negative for abdominal pain, constipation, diarrhea, nausea and vomiting.   Endocrine: Negative for cold intolerance and heat intolerance.   Genitourinary: Negative for dysuria and flank pain.   Musculoskeletal: Positive for arthralgias. Negative for joint swelling.        Right hand   Skin: Negative for pallor and rash.   Allergic/Immunologic: Negative for environmental allergies and food allergies.   Neurological: Negative for dizziness, weakness and numbness.   Hematological: Negative for adenopathy. Does not bruise/bleed easily.   Psychiatric/Behavioral: Negative for behavioral problems and dysphoric mood. The patient is not nervous/anxious.          Current Outpatient Medications:   •  Loratadine 10 MG capsule, Take 1 capsule by mouth Daily., Disp: , Rfl:   •  tiZANidine (ZANAFLEX) 4 MG tablet, Take 1 tablet by mouth At  "Night As Needed for Muscle Spasms (may repeat 6-8 hours)., Disp: 20 tablet, Rfl: 0    Objective     Blood pressure 137/85, pulse 69, temperature 97.8 °F (36.6 °C), resp. rate 16, height 167.6 cm (65.98\"), weight 60.3 kg (133 lb), SpO2 98 %.    Physical Exam   Constitutional: He is oriented to person, place, and time. He appears well-developed and well-nourished. No distress.   HENT:   Head: Normocephalic and atraumatic.   Right Ear: External ear normal.   Left Ear: External ear normal.   Nose: Nose normal.   Mouth/Throat: Oropharynx is clear and moist.   Eyes: Pupils are equal, round, and reactive to light. Conjunctivae and EOM are normal.   Neck: Normal range of motion. Neck supple. No thyromegaly present.   Cardiovascular: Normal rate, regular rhythm and normal heart sounds.   Pulmonary/Chest: Effort normal. No respiratory distress.   Abdominal: Soft. He exhibits no distension. There is no tenderness. There is no guarding.   Musculoskeletal: He exhibits tenderness and deformity. He exhibits no edema.   Right hand   Lymphadenopathy:     He has no cervical adenopathy.   Neurological: He is alert and oriented to person, place, and time.   No gross motor or sensory deficits   Skin: Skin is warm and dry. He is not diaphoretic. No erythema.   Psychiatric: He has a normal mood and affect.   Nursing note and vitals reviewed.    Patient's Body mass index is 21.48 kg/m².        Results for orders placed or performed in visit on 05/06/19   Comprehensive Metabolic Panel   Result Value Ref Range    Glucose 96 65 - 99 mg/dL    BUN 9 6 - 20 mg/dL    Creatinine 1.13 0.76 - 1.27 mg/dL    Sodium 140 136 - 145 mmol/L    Potassium 4.7 3.5 - 5.2 mmol/L    Chloride 102 98 - 107 mmol/L    CO2 25.6 22.0 - 29.0 mmol/L    Calcium 9.4 8.6 - 10.5 mg/dL    Total Protein 7.3 6.0 - 8.5 g/dL    Albumin 4.80 3.50 - 5.20 g/dL    ALT (SGPT) 9 1 - 41 U/L    AST (SGOT) 14 1 - 40 U/L    Alkaline Phosphatase 68 39 - 117 U/L    Total Bilirubin 0.5 0.2 - " 1.2 mg/dL    eGFR Non African Amer 70 >60 mL/min/1.73    Globulin 2.5 gm/dL    A/G Ratio 1.9 g/dL    BUN/Creatinine Ratio 8.0 7.0 - 25.0    Anion Gap 12.4 5.0 - 15.0 mmol/L   Lipid Panel   Result Value Ref Range    Total Cholesterol 162 0 - 200 mg/dL    Triglycerides 81 0 - 150 mg/dL    HDL Cholesterol 50 40 - 60 mg/dL    LDL Cholesterol  96 0 - 100 mg/dL    VLDL Cholesterol 16.2 5 - 40 mg/dL    LDL/HDL Ratio 1.92    Vitamin B12   Result Value Ref Range    Vitamin B-12 411 211 - 946 pg/mL   Vitamin D 25 Hydroxy   Result Value Ref Range    25 Hydroxy, Vitamin D 23.3 (L) 30.0 - 100.0 ng/ml   Hemoglobin A1c   Result Value Ref Range    Hemoglobin A1C 5.09 4.80 - 5.60 %   CBC Auto Differential   Result Value Ref Range    WBC 6.01 3.40 - 10.80 10*3/mm3    RBC 5.78 4.14 - 5.80 10*6/mm3    Hemoglobin 17.0 13.0 - 17.7 g/dL    Hematocrit 52.1 (H) 37.5 - 51.0 %    MCV 90.1 79.0 - 97.0 fL    MCH 29.4 26.6 - 33.0 pg    MCHC 32.6 31.5 - 35.7 g/dL    RDW 12.8 12.3 - 15.4 %    RDW-SD 42.0 37.0 - 54.0 fl    MPV 11.2 6.0 - 12.0 fL    Platelets 287 140 - 450 10*3/mm3    Neutrophil % 58.6 42.7 - 76.0 %    Lymphocyte % 23.1 19.6 - 45.3 %    Monocyte % 11.1 5.0 - 12.0 %    Eosinophil % 5.0 0.3 - 6.2 %    Basophil % 2.0 (H) 0.0 - 1.5 %    Immature Grans % 0.2 0.0 - 0.5 %    Neutrophils, Absolute 3.52 1.70 - 7.00 10*3/mm3    Lymphocytes, Absolute 1.39 0.70 - 3.10 10*3/mm3    Monocytes, Absolute 0.67 0.10 - 0.90 10*3/mm3    Eosinophils, Absolute 0.30 0.00 - 0.40 10*3/mm3    Basophils, Absolute 0.12 0.00 - 0.20 10*3/mm3    Immature Grans, Absolute 0.01 0.00 - 0.05 10*3/mm3    nRBC 0.0 0.0 - 0.2 /100 WBC         Assessment/Plan   Phani was seen today for hand pain and fall.    Diagnoses and all orders for this visit:    Right hand pain  -     Ambulatory Referral to Hand Surgery  -     XR Hand 3+ View Right    Fall, initial encounter  -     XR Hand 3+ View Right      Plan:  1. Right hand pain : will get xray , refer to hand surgery  2. Fall :  fall precautions advised           Annabelle Jarvis MD

## 2020-01-27 ENCOUNTER — HOSPITAL ENCOUNTER (OUTPATIENT)
Dept: GENERAL RADIOLOGY | Facility: HOSPITAL | Age: 50
Discharge: HOME | End: 2020-01-27
Admitting: INTERNAL MEDICINE

## 2020-01-27 PROCEDURE — 73130 X-RAY EXAM OF HAND: CPT

## 2020-02-03 ENCOUNTER — TELEPHONE (OUTPATIENT)
Dept: INTERNAL MEDICINE | Facility: CLINIC | Age: 50
End: 2020-02-03

## 2020-02-03 NOTE — TELEPHONE ENCOUNTER
PT CALLED REQUESTING REFERRAL TO HAND SPECAL[facility] FOR THE RIGHT. PLEASE ADVISE.      PT CALL BACK [phone]

## 2020-03-06 ENCOUNTER — TREATMENT (OUTPATIENT)
Dept: PHYSICAL THERAPY | Facility: CLINIC | Age: 50
End: 2020-03-06

## 2020-03-06 ENCOUNTER — TRANSCRIBE ORDERS (OUTPATIENT)
Dept: PHYSICAL THERAPY | Facility: CLINIC | Age: 50
End: 2020-03-06

## 2020-03-06 DIAGNOSIS — S69.91XD INJURY OF FINGER OF RIGHT HAND, SUBSEQUENT ENCOUNTER: Primary | ICD-10-CM

## 2020-03-06 DIAGNOSIS — M20.021 BOUTONNIERE DEFORMITY OF FINGER OF RIGHT HAND: Primary | ICD-10-CM

## 2020-03-06 PROCEDURE — L3927 FO PIP DIP NO JT SPR PRE OTS: HCPCS | Performed by: PHYSICAL THERAPIST

## 2020-03-06 PROCEDURE — L3925 FO PIP DIP JNT/SPRNG PRE OTS: HCPCS | Performed by: PHYSICAL THERAPIST

## 2020-03-09 NOTE — PROGRESS NOTES
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  Hit hand some how, injured the right long/ring fingers. Has had flexion contractures since.  Medical/Vocational History/ Medications: Works at [employer]    Pain: Pain PIP joints of ring/long fingers right hand    Edema: moderate edema PIP joints of ring/long fingers  Sensibility: WNL   Wound Status:N/A  ROM/ Strength/Test: Long finger PIP joint 30 degree flexion contracture, Ring Finger PIP joint 25 degree flexion contracture.    Splinting:  · Patient was measure and fit with a size small dynamic digital PIP extension tube splint, and dynamic PIP extension coil splint size medium for ring finger   · Patient was instructed in wearing schedule, precautions and care of the splint during this visit.   · Patient was instructed in proper donning/doffing of splint.   Assessment:  · Patient was fitted and appropriate splint was fabricated this date.  · Patient reported that splint was comfortable and had no complications with the fit of the splint.  · Patient was instructed and patient verbalized understanding of precautions, wear and care of the splint.   · Patient demonstrated independent donning/doffing of splint during treatment today.  Goals:  · Patient was fitted properly with appropriate splint for diagnosis  · Patient was educated on precautions, wear schedule and care of splint  · Patient demonstrated independence with donning/doffing of the splint.  · Splint was provided to increase ROM and Improve joint alignment.  Plan:  · No additional treatment is required for this patient at this time. The patient is therefore discharged from therapy.  · Patient advised to contact therapist with any additional questions or concerns  regarding the fit and function of the splint.  · Patient will be seen for splint issues as needed   · Wear Instructions: wear at night.      PT SIGNATURE: Ronan Currie PT, [facility]  DATE TREATMENT INITIATED: 3/6/2020

## 2020-06-04 ENCOUNTER — OFFICE VISIT (OUTPATIENT)
Dept: GASTROENTEROLOGY | Facility: CLINIC | Age: 50
End: 2020-06-04

## 2020-06-04 ENCOUNTER — OFFICE VISIT (OUTPATIENT)
Dept: INTERNAL MEDICINE | Facility: CLINIC | Age: 50
End: 2020-06-04

## 2020-06-04 VITALS
RESPIRATION RATE: 15 BRPM | OXYGEN SATURATION: 99 % | HEART RATE: 71 BPM | WEIGHT: 136 LBS | BODY MASS INDEX: 21.86 KG/M2 | HEIGHT: 66 IN | TEMPERATURE: 98.2 F | SYSTOLIC BLOOD PRESSURE: 124 MMHG | DIASTOLIC BLOOD PRESSURE: 70 MMHG

## 2020-06-04 VITALS
BODY MASS INDEX: 21.86 KG/M2 | WEIGHT: 136 LBS | DIASTOLIC BLOOD PRESSURE: 82 MMHG | RESPIRATION RATE: 18 BRPM | SYSTOLIC BLOOD PRESSURE: 120 MMHG | HEIGHT: 66 IN | HEART RATE: 67 BPM | TEMPERATURE: 98 F | OXYGEN SATURATION: 98 %

## 2020-06-04 DIAGNOSIS — K21.9 GASTROESOPHAGEAL REFLUX DISEASE WITHOUT ESOPHAGITIS: ICD-10-CM

## 2020-06-04 DIAGNOSIS — Z00.00 ROUTINE GENERAL MEDICAL EXAMINATION AT A HEALTH CARE FACILITY: Primary | ICD-10-CM

## 2020-06-04 DIAGNOSIS — R13.14 PHARYNGOESOPHAGEAL DYSPHAGIA: ICD-10-CM

## 2020-06-04 DIAGNOSIS — Z86.010 PERSONAL HISTORY OF COLONIC POLYPS: ICD-10-CM

## 2020-06-04 DIAGNOSIS — Z80.0 FAMILY HX OF COLON CANCER: ICD-10-CM

## 2020-06-04 DIAGNOSIS — R13.19 ESOPHAGEAL DYSPHAGIA: Primary | ICD-10-CM

## 2020-06-04 LAB
25(OH)D3+25(OH)D2 SERPL-MCNC: 30.7 NG/ML (ref 30–100)
ALBUMIN SERPL-MCNC: 4.7 G/DL (ref 3.5–5.2)
ALBUMIN/GLOB SERPL: 1.7 G/DL
ALP SERPL-CCNC: 70 U/L (ref 39–117)
ALT SERPL-CCNC: 12 U/L (ref 1–41)
AST SERPL-CCNC: 11 U/L (ref 1–40)
BASOPHILS # BLD AUTO: 0.08 10*3/MM3 (ref 0–0.2)
BASOPHILS NFR BLD AUTO: 1.5 % (ref 0–1.5)
BILIRUB SERPL-MCNC: 1 MG/DL (ref 0.2–1.2)
BUN SERPL-MCNC: 13 MG/DL (ref 6–20)
BUN/CREAT SERPL: 12 (ref 7–25)
CALCIUM SERPL-MCNC: 9.4 MG/DL (ref 8.6–10.5)
CHLORIDE SERPL-SCNC: 103 MMOL/L (ref 98–107)
CHOLEST SERPL-MCNC: 175 MG/DL (ref 0–200)
CO2 SERPL-SCNC: 26.1 MMOL/L (ref 22–29)
CREAT SERPL-MCNC: 1.08 MG/DL (ref 0.76–1.27)
EOSINOPHIL # BLD AUTO: 0.21 10*3/MM3 (ref 0–0.4)
EOSINOPHIL NFR BLD AUTO: 4 % (ref 0.3–6.2)
ERYTHROCYTE [DISTWIDTH] IN BLOOD BY AUTOMATED COUNT: 13.1 % (ref 12.3–15.4)
GLOBULIN SER CALC-MCNC: 2.7 GM/DL
GLUCOSE SERPL-MCNC: 97 MG/DL (ref 65–99)
HBA1C MFR BLD: 5 % (ref 4.8–5.6)
HCT VFR BLD AUTO: 49.7 % (ref 37.5–51)
HDLC SERPL-MCNC: 53 MG/DL (ref 40–60)
HGB BLD-MCNC: 17.2 G/DL (ref 13–17.7)
IMM GRANULOCYTES # BLD AUTO: 0.02 10*3/MM3 (ref 0–0.05)
IMM GRANULOCYTES NFR BLD AUTO: 0.4 % (ref 0–0.5)
LDLC SERPL CALC-MCNC: 109 MG/DL (ref 0–100)
LYMPHOCYTES # BLD AUTO: 0.9 10*3/MM3 (ref 0.7–3.1)
LYMPHOCYTES NFR BLD AUTO: 17.3 % (ref 19.6–45.3)
MCH RBC QN AUTO: 30.3 PG (ref 26.6–33)
MCHC RBC AUTO-ENTMCNC: 34.6 G/DL (ref 31.5–35.7)
MCV RBC AUTO: 87.5 FL (ref 79–97)
MONOCYTES # BLD AUTO: 0.58 10*3/MM3 (ref 0.1–0.9)
MONOCYTES NFR BLD AUTO: 11.2 % (ref 5–12)
NEUTROPHILS # BLD AUTO: 3.41 10*3/MM3 (ref 1.7–7)
NEUTROPHILS NFR BLD AUTO: 65.6 % (ref 42.7–76)
NRBC BLD AUTO-RTO: 0 /100 WBC (ref 0–0.2)
PLATELET # BLD AUTO: 285 10*3/MM3 (ref 140–450)
POTASSIUM SERPL-SCNC: 4.6 MMOL/L (ref 3.5–5.2)
PROT SERPL-MCNC: 7.4 G/DL (ref 6–8.5)
RBC # BLD AUTO: 5.68 10*6/MM3 (ref 4.14–5.8)
SODIUM SERPL-SCNC: 136 MMOL/L (ref 136–145)
TRIGL SERPL-MCNC: 65 MG/DL (ref 0–150)
TSH SERPL DL<=0.005 MIU/L-ACNC: 1.18 UIU/ML (ref 0.27–4.2)
VIT B12 SERPL-MCNC: 350 PG/ML (ref 211–946)
VLDLC SERPL CALC-MCNC: 13 MG/DL
WBC # BLD AUTO: 5.2 10*3/MM3 (ref 3.4–10.8)

## 2020-06-04 PROCEDURE — 99396 PREV VISIT EST AGE 40-64: CPT | Performed by: INTERNAL MEDICINE

## 2020-06-04 PROCEDURE — 99204 OFFICE O/P NEW MOD 45 MIN: CPT | Performed by: INTERNAL MEDICINE

## 2020-06-04 RX ORDER — PANTOPRAZOLE SODIUM 40 MG/1
40 TABLET, DELAYED RELEASE ORAL DAILY
Qty: 30 TABLET | Refills: 1 | Status: SHIPPED | OUTPATIENT
Start: 2020-06-04 | End: 2020-06-25 | Stop reason: SDUPTHER

## 2020-06-04 RX ORDER — SODIUM CHLORIDE 9 MG/ML
70 INJECTION, SOLUTION INTRAVENOUS CONTINUOUS PRN
Status: CANCELLED | OUTPATIENT
Start: 2020-06-15

## 2020-06-04 NOTE — PATIENT INSTRUCTIONS
Food Choices for Gastroesophageal Reflux Disease, Adult  When you have gastroesophageal reflux disease (GERD), the foods you eat and your eating habits are very important. Choosing the right foods can help ease your discomfort. Think about working with a nutrition specialist (dietitian) to help you make good choices.  What are tips for following this plan?    Meals  · Choose healthy foods that are low in fat, such as fruits, vegetables, whole grains, low-fat dairy products, and lean meat, fish, and poultry.  · Eat small meals often instead of 3 large meals a day. Eat your meals slowly, and in a place where you are relaxed. Avoid bending over or lying down until 2-3 hours after eating.  · Avoid eating meals 2-3 hours before bed.  · Avoid drinking a lot of liquid with meals.  · Cook foods using methods other than frying. Bake, grill, or broil food instead.  · Avoid or limit:  ? Chocolate.  ? Peppermint or spearmint.  ? Alcohol.  ? Pepper.  ? Black and decaffeinated coffee.  ? Black and decaffeinated tea.  ? Bubbly (carbonated) soft drinks.  ? Caffeinated energy drinks and soft drinks.  · Limit high-fat foods such as:  ? Fatty meat or fried foods.  ? Whole milk, cream, butter, or ice cream.  ? Nuts and nut butters.  ? Pastries, donuts, and sweets made with butter or shortening.  · Avoid foods that cause symptoms. These foods may be different for everyone. Common foods that cause symptoms include:  ? Tomatoes.  ? Oranges, sarahi, and limes.  ? Peppers.  ? Spicy food.  ? Onions and garlic.  ? Vinegar.  Lifestyle  · Maintain a healthy weight. Ask your doctor what weight is healthy for you. If you need to lose weight, work with your doctor to do so safely.  · Exercise for at least 30 minutes for 5 or more days each week, or as told by your doctor.  · Wear loose-fitting clothes.  · Do not smoke. If you need help quitting, ask your doctor.  · Sleep with the head of your bed higher than your feet. Use a wedge under the  mattress or blocks under the bed frame to raise the head of the bed.  Summary  · When you have gastroesophageal reflux disease (GERD), food and lifestyle choices are very important in easing your symptoms.  · Eat small meals often instead of 3 large meals a day. Eat your meals slowly, and in a place where you are relaxed.  · Limit high-fat foods such as fatty meat or fried foods.  · Avoid bending over or lying down until 2-3 hours after eating.  · Avoid peppermint and spearmint, caffeine, alcohol, and chocolate.  This information is not intended to replace advice given to you by your health care provider. Make sure you discuss any questions you have with your health care provider.  Document Released: 06/18/2013 Document Revised: 04/09/2020 Document Reviewed: 01/23/2018  Elsevier Patient Education © 2020 [employer]

## 2020-06-04 NOTE — PROGRESS NOTES
Subjective   Phani Paulson is a 50 y.o. male.     Chief Complaint   Patient presents with   • Annual Exam     physical, pt is fasting   • Difficulty Swallowing       History of Present Illness    patient is here for physical and labs , he complains of dysphagia to solids mostly and it is getting worse since the past few weeks and this week he had to regurgitate his  Spaghetti    Review of Systems   Constitutional: Negative for appetite change, fatigue and fever.   HENT: Negative for congestion, ear discharge, ear pain, sinus pressure and sore throat.    Eyes: Negative for pain and discharge.   Respiratory: Negative for cough, shortness of breath and wheezing.    Cardiovascular: Negative for chest pain, palpitations and leg swelling.   Gastrointestinal: Negative for abdominal pain, constipation, diarrhea, nausea and vomiting.        Dysphagia   Endocrine: Negative for cold intolerance and heat intolerance.   Genitourinary: Negative for dysuria and flank pain.   Musculoskeletal: Negative for arthralgias and joint swelling.   Skin: Negative for pallor and rash.   Allergic/Immunologic: Negative for environmental allergies and food allergies.   Neurological: Negative for dizziness, weakness and numbness.   Hematological: Negative for adenopathy. Does not bruise/bleed easily.   Psychiatric/Behavioral: Negative for behavioral problems and dysphoric mood. The patient is not nervous/anxious.        Past Medical History:   Diagnosis Date   • Acute maxillary sinusitis    • Acute suppurative otitis media of both ears without spontaneous rupture of tympanic membranes    • Allergy    • Lesion of skin of face    • Neoplasm of uncertain behavior of skin    • Post varicella        Past Surgical History:   Procedure Laterality Date   • COLONOSCOPY         Family History   Problem Relation Age of Onset   • Colon cancer Mother    • Hypertension Mother    • Skin cancer Father         reports that he has never smoked. He has never  "used smokeless tobacco. He reports that he does not drink alcohol or use drugs.    No Known Allergies        Current Outpatient Medications:   •  Loratadine 10 MG capsule, Take 1 capsule by mouth Daily., Disp: , Rfl:       Objective   Blood pressure 124/70, pulse 71, temperature 98.2 °F (36.8 °C), resp. rate 15, height 167.6 cm (65.98\"), weight 61.7 kg (136 lb), SpO2 99 %.    Physical Exam   Constitutional: He is oriented to person, place, and time. He appears well-developed and well-nourished. No distress.   HENT:   Head: Normocephalic and atraumatic.   Right Ear: External ear normal.   Left Ear: External ear normal.   Nose: Nose normal.   Mouth/Throat: Oropharynx is clear and moist.   Eyes: Pupils are equal, round, and reactive to light. Conjunctivae and EOM are normal.   Neck: Normal range of motion. Neck supple. No thyromegaly present.   Cardiovascular: Normal rate, regular rhythm and normal heart sounds.   Pulmonary/Chest: Effort normal. No respiratory distress.   Abdominal: Soft. He exhibits no distension. There is no tenderness. There is no guarding.   Musculoskeletal: Normal range of motion. He exhibits no edema.   Lymphadenopathy:     He has no cervical adenopathy.   Neurological: He is alert and oriented to person, place, and time.   No gross motor or sensory deficits   Skin: Skin is warm and dry. He is not diaphoretic. No erythema.   Psychiatric: He has a normal mood and affect.   Nursing note and vitals reviewed.      Patient's Body mass index is 21.96 kg/m². BMI is within normal parameters. No follow-up required..      Results for orders placed or performed in visit on 05/06/19   Comprehensive Metabolic Panel   Result Value Ref Range    Glucose 96 65 - 99 mg/dL    BUN 9 6 - 20 mg/dL    Creatinine 1.13 0.76 - 1.27 mg/dL    Sodium 140 136 - 145 mmol/L    Potassium 4.7 3.5 - 5.2 mmol/L    Chloride 102 98 - 107 mmol/L    CO2 25.6 22.0 - 29.0 mmol/L    Calcium 9.4 8.6 - 10.5 mg/dL    Total Protein 7.3 6.0 - " 8.5 g/dL    Albumin 4.80 3.50 - 5.20 g/dL    ALT (SGPT) 9 1 - 41 U/L    AST (SGOT) 14 1 - 40 U/L    Alkaline Phosphatase 68 39 - 117 U/L    Total Bilirubin 0.5 0.2 - 1.2 mg/dL    eGFR Non African Amer 70 >60 mL/min/1.73    Globulin 2.5 gm/dL    A/G Ratio 1.9 g/dL    BUN/Creatinine Ratio 8.0 7.0 - 25.0    Anion Gap 12.4 5.0 - 15.0 mmol/L   Lipid Panel   Result Value Ref Range    Total Cholesterol 162 0 - 200 mg/dL    Triglycerides 81 0 - 150 mg/dL    HDL Cholesterol 50 40 - 60 mg/dL    LDL Cholesterol  96 0 - 100 mg/dL    VLDL Cholesterol 16.2 5 - 40 mg/dL    LDL/HDL Ratio 1.92    Vitamin B12   Result Value Ref Range    Vitamin B-12 411 211 - 946 pg/mL   Vitamin D 25 Hydroxy   Result Value Ref Range    25 Hydroxy, Vitamin D 23.3 (L) 30.0 - 100.0 ng/ml   Hemoglobin A1c   Result Value Ref Range    Hemoglobin A1C 5.09 4.80 - 5.60 %   CBC Auto Differential   Result Value Ref Range    WBC 6.01 3.40 - 10.80 10*3/mm3    RBC 5.78 4.14 - 5.80 10*6/mm3    Hemoglobin 17.0 13.0 - 17.7 g/dL    Hematocrit 52.1 (H) 37.5 - 51.0 %    MCV 90.1 79.0 - 97.0 fL    MCH 29.4 26.6 - 33.0 pg    MCHC 32.6 31.5 - 35.7 g/dL    RDW 12.8 12.3 - 15.4 %    RDW-SD 42.0 37.0 - 54.0 fl    MPV 11.2 6.0 - 12.0 fL    Platelets 287 140 - 450 10*3/mm3    Neutrophil % 58.6 42.7 - 76.0 %    Lymphocyte % 23.1 19.6 - 45.3 %    Monocyte % 11.1 5.0 - 12.0 %    Eosinophil % 5.0 0.3 - 6.2 %    Basophil % 2.0 (H) 0.0 - 1.5 %    Immature Grans % 0.2 0.0 - 0.5 %    Neutrophils, Absolute 3.52 1.70 - 7.00 10*3/mm3    Lymphocytes, Absolute 1.39 0.70 - 3.10 10*3/mm3    Monocytes, Absolute 0.67 0.10 - 0.90 10*3/mm3    Eosinophils, Absolute 0.30 0.00 - 0.40 10*3/mm3    Basophils, Absolute 0.12 0.00 - 0.20 10*3/mm3    Immature Grans, Absolute 0.01 0.00 - 0.05 10*3/mm3    nRBC 0.0 0.0 - 0.2 /100 WBC         Assessment/Plan   Phani was seen today for annual exam and difficulty swallowing.    Diagnoses and all orders for this visit:    Routine general medical examination at a  health care facility  -     CBC & Differential  -     Comprehensive Metabolic Panel  -     Lipid Panel  -     Hemoglobin A1c  -     TSH  -     Vitamin B12  -     Vitamin D 25 Hydroxy    Pharyngoesophageal dysphagia  -     Ambulatory Referral to Gastroenterology        plan:  1.physical: Physical exam conducted today,  Will obtain fasting lab work,   Impression: healthy adult Patient. Currently, patient eats a healthy diet. Screening lab work includes glucose, lipid profile , complete blood count and comprehensive panel . The risks and benefits of immunizations were discussed and immunizations are up to date. Advice and education were given regarding nutrition and aerobic exercise. Patient discussion: discussed with the patient.  Physical with preventive exam as well as age and risk appropriate counseling completed.   Patient Discussion: plan discussed with the patient, follow-up visit needed in one year. Medication Review and medication list updated ,  regular weight-bearing exercise  2. Dysphagia : refer to debora Jarvis MD

## 2020-06-04 NOTE — PROGRESS NOTES
New Patient Consult      Date: 2020   Patient Name: Phani Paulson  MRN: [id number]  : 1970     Referring Physician: Annabelle Jarvis MD    Chief Complaint   Patient presents with   • Difficulty Swallowing       History of Present Illness: Phani Paulson is a 50 y.o. male who is here today to establish care with Gastroenterology for evaluation of dysphagia.  The patient has difficulty swallowing off and on for the last six months. The symptom is moderate in severity, occurs occasionally once in few weeks mostly associated with solid foods.  The symptoms are progressive now.  Recently he had choking with spagetti and he had to literally cough it out after some time. Whe these happens he will drink water and walk around and gets better he says. The patient points towards the upper substernal area. He deny any odynophagia. He has history of acid reflux on and off few years. Takes occasional Tums otherwise not on any medication. He may have reflux episodes once a week or so.  Deny any nausea or vomiting.  There is no associated weight loss. No h/o food allergy.   No associated abdominal pain or distension. Deny  any change in bowel habit, hematochezia or melena.   There is no history of anemia. No prior history of EGD. He had a colonoscopy 2 years ago and few polyps removed. Family history of colon cancer- Mother had colon CA at 65yrs of age. No other any GI malignancy.   Non smoker  and no ETOH abuse.     Subjective      Past Medical History:   Past Medical History:   Diagnosis Date   • Acute maxillary sinusitis    • Acute suppurative otitis media of both ears without spontaneous rupture of tympanic membranes    • Allergy    • Lesion of skin of face    • Neoplasm of uncertain behavior of skin    • Post varicella        Past Surgical History:   Past Surgical History:   Procedure Laterality Date   • COLONOSCOPY         Family History:   Family History   Problem Relation Age of Onset   • Colon  cancer Mother    • Hypertension Mother    • Skin cancer Father        Social History:   Social History     Socioeconomic History   • Marital status:      Spouse name: Not on file   • Number of children: Not on file   • Years of education: Not on file   • Highest education level: Not on file   Tobacco Use   • Smoking status: Never Smoker   • Smokeless tobacco: Never Used   Substance and Sexual Activity   • Alcohol use: No   • Drug use: Never   • Sexual activity: Defer         Current Outpatient Medications:   •  Loratadine 10 MG capsule, Take 1 capsule by mouth Daily., Disp: , Rfl:   •  pantoprazole (Protonix) 40 MG EC tablet, Take 1 tablet by mouth Daily for 30 days., Disp: 30 tablet, Rfl: 1    No Known Allergies    Review of Systems:   Review of Systems   Constitutional: Negative for appetite change, fatigue, fever and unexpected weight loss.   HENT: Positive for trouble swallowing.    Respiratory: Negative for cough, shortness of breath and wheezing.    Cardiovascular: Negative for chest pain, palpitations and leg swelling.   Gastrointestinal: Negative for abdominal distention, abdominal pain, anal bleeding, blood in stool, constipation, diarrhea, nausea, rectal pain, vomiting, GERD and indigestion.   Genitourinary: Negative for dysuria, frequency and hematuria.   Musculoskeletal: Negative for back pain and joint swelling.   Skin: Negative for color change, rash and skin lesions.   Neurological: Negative for dizziness, syncope, speech difficulty, weakness, headache and memory problem.   Hematological: Negative for adenopathy. Does not bruise/bleed easily.   Psychiatric/Behavioral: Negative for agitation, behavioral problems, suicidal ideas and depressed mood.       The following portions of the patient's history were reviewed and updated as appropriate: allergies, current medications, past family history, past medical history, past social history, past surgical history and problem list.    Objective  "    Physical Exam:  Vital Signs:   Vitals:    06/04/20 1257   BP: 120/82   Pulse: 67   Resp: 18   Temp: 98 °F (36.7 °C)   TempSrc: Temporal   SpO2: 98%   Weight: 61.7 kg (136 lb)   Height: 167.6 cm (66\")       Physical Exam   Constitutional: He is oriented to person, place, and time. He appears well-developed and well-nourished.   HENT:   Head: Normocephalic and atraumatic.   Right Ear: External ear normal.   Left Ear: External ear normal.   Mouth/Throat: Oropharynx is clear and moist.   Eyes: Pupils are equal, round, and reactive to light. Conjunctivae and EOM are normal.   Neck: Normal range of motion. No tracheal deviation present. No thyromegaly present.   Cardiovascular: Normal rate and regular rhythm.   No murmur heard.  Pulmonary/Chest: Effort normal and breath sounds normal. No respiratory distress.   Abdominal: Soft. Bowel sounds are normal. He exhibits no distension and no mass. There is no tenderness. No hernia.   Musculoskeletal: Normal range of motion. He exhibits no edema.   Neurological: He is alert and oriented to person, place, and time. No cranial nerve deficit or sensory deficit.   Skin: Skin is warm and dry.   Psychiatric: He has a normal mood and affect. His behavior is normal. Judgment and thought content normal.   Nursing note and vitals reviewed.      Results Review:   I have reviewed the patient's new clinical and imaging results and agree with the interpretation.     No visits with results within 90 Day(s) from this visit.   Latest known visit with results is:   Office Visit on 05/06/2019   Component Date Value Ref Range Status   • Glucose 08/30/2019 96  65 - 99 mg/dL Final   • BUN 08/30/2019 9  6 - 20 mg/dL Final   • Creatinine 08/30/2019 1.13  0.76 - 1.27 mg/dL Final   • Sodium 08/30/2019 140  136 - 145 mmol/L Final   • Potassium 08/30/2019 4.7  3.5 - 5.2 mmol/L Final   • Chloride 08/30/2019 102  98 - 107 mmol/L Final   • CO2 08/30/2019 25.6  22.0 - 29.0 mmol/L Final   • Calcium " 08/30/2019 9.4  8.6 - 10.5 mg/dL Final   • Total Protein 08/30/2019 7.3  6.0 - 8.5 g/dL Final   • Albumin 08/30/2019 4.80  3.50 - 5.20 g/dL Final   • ALT (SGPT) 08/30/2019 9  1 - 41 U/L Final   • AST (SGOT) 08/30/2019 14  1 - 40 U/L Final   • Alkaline Phosphatase 08/30/2019 68  39 - 117 U/L Final   • Total Bilirubin 08/30/2019 0.5  0.2 - 1.2 mg/dL Final   • eGFR Non African Amer 08/30/2019 70  >60 mL/min/1.73 Final   • Globulin 08/30/2019 2.5  gm/dL Final   • A/G Ratio 08/30/2019 1.9  g/dL Final   • BUN/Creatinine Ratio 08/30/2019 8.0  7.0 - 25.0 Final   • Anion Gap 08/30/2019 12.4  5.0 - 15.0 mmol/L Final   • Total Cholesterol 08/30/2019 162  0 - 200 mg/dL Final   • Triglycerides 08/30/2019 81  0 - 150 mg/dL Final   • HDL Cholesterol 08/30/2019 50  40 - 60 mg/dL Final   • LDL Cholesterol  08/30/2019 96  0 - 100 mg/dL Final   • VLDL Cholesterol 08/30/2019 16.2  5 - 40 mg/dL Final   • LDL/HDL Ratio 08/30/2019 1.92   Final   • Vitamin B-12 08/30/2019 411  211 - 946 pg/mL Final   • 25 Hydroxy, Vitamin D 08/30/2019 23.3* 30.0 - 100.0 ng/ml Final   • Hemoglobin A1C 08/30/2019 5.09  4.80 - 5.60 % Final   • WBC 08/30/2019 6.01  3.40 - 10.80 10*3/mm3 Final   • RBC 08/30/2019 5.78  4.14 - 5.80 10*6/mm3 Final   • Hemoglobin 08/30/2019 17.0  13.0 - 17.7 g/dL Final   • Hematocrit 08/30/2019 52.1* 37.5 - 51.0 % Final   • MCV 08/30/2019 90.1  79.0 - 97.0 fL Final   • MCH 08/30/2019 29.4  26.6 - 33.0 pg Final   • MCHC 08/30/2019 32.6  31.5 - 35.7 g/dL Final   • RDW 08/30/2019 12.8  12.3 - 15.4 % Final   • RDW-SD 08/30/2019 42.0  37.0 - 54.0 fl Final   • MPV 08/30/2019 11.2  6.0 - 12.0 fL Final   • Platelets 08/30/2019 287  140 - 450 10*3/mm3 Final   • Neutrophil % 08/30/2019 58.6  42.7 - 76.0 % Final   • Lymphocyte % 08/30/2019 23.1  19.6 - 45.3 % Final   • Monocyte % 08/30/2019 11.1  5.0 - 12.0 % Final   • Eosinophil % 08/30/2019 5.0  0.3 - 6.2 % Final   • Basophil % 08/30/2019 2.0* 0.0 - 1.5 % Final   • Immature Grans %  08/30/2019 0.2  0.0 - 0.5 % Final   • Neutrophils, Absolute 08/30/2019 3.52  1.70 - 7.00 10*3/mm3 Final   • Lymphocytes, Absolute 08/30/2019 1.39  0.70 - 3.10 10*3/mm3 Final   • Monocytes, Absolute 08/30/2019 0.67  0.10 - 0.90 10*3/mm3 Final   • Eosinophils, Absolute 08/30/2019 0.30  0.00 - 0.40 10*3/mm3 Final   • Basophils, Absolute 08/30/2019 0.12  0.00 - 0.20 10*3/mm3 Final   • Immature Grans, Absolute 08/30/2019 0.01  0.00 - 0.05 10*3/mm3 Final   • nRBC 08/30/2019 0.0  0.0 - 0.2 /100 WBC Final      No radiology results for the last 90 days.    Assessment / Plan      Assessment & Plan:  1. Esophageal dysphagia  Progressive esophageal dysphagia since about 6 months.  He does have a esophageal reflux disease for a few years but not in any PPI.  Recently had a food bolus impaction had to literally cough it out.  No other alarming symptoms.  I suspect he may have a benign peptic stricture or EOE.  He needs an EGD for further evaluation and possible esophageal dilatation if any strictures    The indications, technique, alternatives and potential risk and complications were discussed with the patient including but not limited to bleeding, bowel perforations, missing lesions and anesthetic complications. The patient understands and wishes to proceed with the procedure and has given their verbal consent. Written patient education information was given to the patient.   The patient will call if they have further questions before procedure.   - Case Request; Standing  - Implement Anesthesia Orders Day of Procedure; Standing  - Obtain Informed Consent; Standing  - Oxygen Therapy- Nasal Cannula; 2 LPM; Titrate for SPO2: equal to or greater than, 90%; Standing  - POC Glucose Once; Standing  - sodium chloride 0.9 % infusion  - Case Request    2. Gastroesophageal reflux disease without esophagitis  Intermittent reflux symptoms for the past few years.  Gets symptoms at least once or twice per week.  Takes occasional Tums  Given  his progressive dysphagia symptoms we will start him on a PPI daily    3. Personal history of colonic polyps  Last colonoscopy was about 2 years ago at [place].  Few polyps removed and recommended repeat colonoscopy in 5 years  He also has a family history of colon cancer and needs a colonoscopy every 5 years time  Repeat colonoscopy in 2023    4. Family hx of colon cancer  Mom had a colon cancer at age of 65      Follow Up:   Return for Follow Up after Colonscopy.    Manuel Ann MD  [facility]  6/4/2020  13:23    Please note that portions of this note may have been completed with a voice recognition program. Efforts were made to edit the dictations, but occasionally words are mistranscribed.

## 2020-06-04 NOTE — H&P (VIEW-ONLY)
New Patient Consult      Date: 2020   Patient Name: Phani Paulson  MRN: [id number]  : 1970     Referring Physician: Annabelle Jarvis MD    Chief Complaint   Patient presents with   • Difficulty Swallowing       History of Present Illness: Phani Paulson is a 50 y.o. male who is here today to establish care with Gastroenterology for evaluation of dysphagia.  The patient has difficulty swallowing off and on for the last six months. The symptom is moderate in severity, occurs occasionally once in few weeks mostly associated with solid foods.  The symptoms are progressive now.  Recently he had choking with spagetti and he had to literally cough it out after some time. Whe these happens he will drink water and walk around and gets better he says. The patient points towards the upper substernal area. He deny any odynophagia. He has history of acid reflux on and off few years. Takes occasional Tums otherwise not on any medication. He may have reflux episodes once a week or so.  Deny any nausea or vomiting.  There is no associated weight loss. No h/o food allergy.   No associated abdominal pain or distension. Deny  any change in bowel habit, hematochezia or melena.   There is no history of anemia. No prior history of EGD. He had a colonoscopy 2 years ago and few polyps removed. Family history of colon cancer- Mother had colon CA at 65yrs of age. No other any GI malignancy.   Non smoker  and no ETOH abuse.     Subjective      Past Medical History:   Past Medical History:   Diagnosis Date   • Acute maxillary sinusitis    • Acute suppurative otitis media of both ears without spontaneous rupture of tympanic membranes    • Allergy    • Lesion of skin of face    • Neoplasm of uncertain behavior of skin    • Post varicella        Past Surgical History:   Past Surgical History:   Procedure Laterality Date   • COLONOSCOPY         Family History:   Family History   Problem Relation Age of Onset   • Colon  cancer Mother    • Hypertension Mother    • Skin cancer Father        Social History:   Social History     Socioeconomic History   • Marital status:      Spouse name: Not on file   • Number of children: Not on file   • Years of education: Not on file   • Highest education level: Not on file   Tobacco Use   • Smoking status: Never Smoker   • Smokeless tobacco: Never Used   Substance and Sexual Activity   • Alcohol use: No   • Drug use: Never   • Sexual activity: Defer         Current Outpatient Medications:   •  Loratadine 10 MG capsule, Take 1 capsule by mouth Daily., Disp: , Rfl:   •  pantoprazole (Protonix) 40 MG EC tablet, Take 1 tablet by mouth Daily for 30 days., Disp: 30 tablet, Rfl: 1    No Known Allergies    Review of Systems:   Review of Systems   Constitutional: Negative for appetite change, fatigue, fever and unexpected weight loss.   HENT: Positive for trouble swallowing.    Respiratory: Negative for cough, shortness of breath and wheezing.    Cardiovascular: Negative for chest pain, palpitations and leg swelling.   Gastrointestinal: Negative for abdominal distention, abdominal pain, anal bleeding, blood in stool, constipation, diarrhea, nausea, rectal pain, vomiting, GERD and indigestion.   Genitourinary: Negative for dysuria, frequency and hematuria.   Musculoskeletal: Negative for back pain and joint swelling.   Skin: Negative for color change, rash and skin lesions.   Neurological: Negative for dizziness, syncope, speech difficulty, weakness, headache and memory problem.   Hematological: Negative for adenopathy. Does not bruise/bleed easily.   Psychiatric/Behavioral: Negative for agitation, behavioral problems, suicidal ideas and depressed mood.       The following portions of the patient's history were reviewed and updated as appropriate: allergies, current medications, past family history, past medical history, past social history, past surgical history and problem list.    Objective      "    Physical Exam:  Vital Signs:   Vitals:    06/04/20 1257   BP: 120/82   Pulse: 67   Resp: 18   Temp: 98 °F (36.7 °C)   TempSrc: Temporal   SpO2: 98%   Weight: 61.7 kg (136 lb)   Height: 167.6 cm (66\")       Physical Exam   Constitutional: He is oriented to person, place, and time. He appears well-developed and well-nourished.   HENT:   Head: Normocephalic and atraumatic.   Right Ear: External ear normal.   Left Ear: External ear normal.   Mouth/Throat: Oropharynx is clear and moist.   Eyes: Pupils are equal, round, and reactive to light. Conjunctivae and EOM are normal.   Neck: Normal range of motion. No tracheal deviation present. No thyromegaly present.   Cardiovascular: Normal rate and regular rhythm.   No murmur heard.  Pulmonary/Chest: Effort normal and breath sounds normal. No respiratory distress.   Abdominal: Soft. Bowel sounds are normal. He exhibits no distension and no mass. There is no tenderness. No hernia.   Musculoskeletal: Normal range of motion. He exhibits no edema.   Neurological: He is alert and oriented to person, place, and time. No cranial nerve deficit or sensory deficit.   Skin: Skin is warm and dry.   Psychiatric: He has a normal mood and affect. His behavior is normal. Judgment and thought content normal.   Nursing note and vitals reviewed.      Results Review:   I have reviewed the patient's new clinical and imaging results and agree with the interpretation.     No visits with results within 90 Day(s) from this visit.   Latest known visit with results is:   Office Visit on 05/06/2019   Component Date Value Ref Range Status   • Glucose 08/30/2019 96  65 - 99 mg/dL Final   • BUN 08/30/2019 9  6 - 20 mg/dL Final   • Creatinine 08/30/2019 1.13  0.76 - 1.27 mg/dL Final   • Sodium 08/30/2019 140  136 - 145 mmol/L Final   • Potassium 08/30/2019 4.7  3.5 - 5.2 mmol/L Final   • Chloride 08/30/2019 102  98 - 107 mmol/L Final   • CO2 08/30/2019 25.6  22.0 - 29.0 mmol/L Final   • Calcium " 08/30/2019 9.4  8.6 - 10.5 mg/dL Final   • Total Protein 08/30/2019 7.3  6.0 - 8.5 g/dL Final   • Albumin 08/30/2019 4.80  3.50 - 5.20 g/dL Final   • ALT (SGPT) 08/30/2019 9  1 - 41 U/L Final   • AST (SGOT) 08/30/2019 14  1 - 40 U/L Final   • Alkaline Phosphatase 08/30/2019 68  39 - 117 U/L Final   • Total Bilirubin 08/30/2019 0.5  0.2 - 1.2 mg/dL Final   • eGFR Non African Amer 08/30/2019 70  >60 mL/min/1.73 Final   • Globulin 08/30/2019 2.5  gm/dL Final   • A/G Ratio 08/30/2019 1.9  g/dL Final   • BUN/Creatinine Ratio 08/30/2019 8.0  7.0 - 25.0 Final   • Anion Gap 08/30/2019 12.4  5.0 - 15.0 mmol/L Final   • Total Cholesterol 08/30/2019 162  0 - 200 mg/dL Final   • Triglycerides 08/30/2019 81  0 - 150 mg/dL Final   • HDL Cholesterol 08/30/2019 50  40 - 60 mg/dL Final   • LDL Cholesterol  08/30/2019 96  0 - 100 mg/dL Final   • VLDL Cholesterol 08/30/2019 16.2  5 - 40 mg/dL Final   • LDL/HDL Ratio 08/30/2019 1.92   Final   • Vitamin B-12 08/30/2019 411  211 - 946 pg/mL Final   • 25 Hydroxy, Vitamin D 08/30/2019 23.3* 30.0 - 100.0 ng/ml Final   • Hemoglobin A1C 08/30/2019 5.09  4.80 - 5.60 % Final   • WBC 08/30/2019 6.01  3.40 - 10.80 10*3/mm3 Final   • RBC 08/30/2019 5.78  4.14 - 5.80 10*6/mm3 Final   • Hemoglobin 08/30/2019 17.0  13.0 - 17.7 g/dL Final   • Hematocrit 08/30/2019 52.1* 37.5 - 51.0 % Final   • MCV 08/30/2019 90.1  79.0 - 97.0 fL Final   • MCH 08/30/2019 29.4  26.6 - 33.0 pg Final   • MCHC 08/30/2019 32.6  31.5 - 35.7 g/dL Final   • RDW 08/30/2019 12.8  12.3 - 15.4 % Final   • RDW-SD 08/30/2019 42.0  37.0 - 54.0 fl Final   • MPV 08/30/2019 11.2  6.0 - 12.0 fL Final   • Platelets 08/30/2019 287  140 - 450 10*3/mm3 Final   • Neutrophil % 08/30/2019 58.6  42.7 - 76.0 % Final   • Lymphocyte % 08/30/2019 23.1  19.6 - 45.3 % Final   • Monocyte % 08/30/2019 11.1  5.0 - 12.0 % Final   • Eosinophil % 08/30/2019 5.0  0.3 - 6.2 % Final   • Basophil % 08/30/2019 2.0* 0.0 - 1.5 % Final   • Immature Grans %  08/30/2019 0.2  0.0 - 0.5 % Final   • Neutrophils, Absolute 08/30/2019 3.52  1.70 - 7.00 10*3/mm3 Final   • Lymphocytes, Absolute 08/30/2019 1.39  0.70 - 3.10 10*3/mm3 Final   • Monocytes, Absolute 08/30/2019 0.67  0.10 - 0.90 10*3/mm3 Final   • Eosinophils, Absolute 08/30/2019 0.30  0.00 - 0.40 10*3/mm3 Final   • Basophils, Absolute 08/30/2019 0.12  0.00 - 0.20 10*3/mm3 Final   • Immature Grans, Absolute 08/30/2019 0.01  0.00 - 0.05 10*3/mm3 Final   • nRBC 08/30/2019 0.0  0.0 - 0.2 /100 WBC Final      No radiology results for the last 90 days.    Assessment / Plan      Assessment & Plan:  1. Esophageal dysphagia  Progressive esophageal dysphagia since about 6 months.  He does have a esophageal reflux disease for a few years but not in any PPI.  Recently had a food bolus impaction had to literally cough it out.  No other alarming symptoms.  I suspect he may have a benign peptic stricture or EOE.  He needs an EGD for further evaluation and possible esophageal dilatation if any strictures    The indications, technique, alternatives and potential risk and complications were discussed with the patient including but not limited to bleeding, bowel perforations, missing lesions and anesthetic complications. The patient understands and wishes to proceed with the procedure and has given their verbal consent. Written patient education information was given to the patient.   The patient will call if they have further questions before procedure.   - Case Request; Standing  - Implement Anesthesia Orders Day of Procedure; Standing  - Obtain Informed Consent; Standing  - Oxygen Therapy- Nasal Cannula; 2 LPM; Titrate for SPO2: equal to or greater than, 90%; Standing  - POC Glucose Once; Standing  - sodium chloride 0.9 % infusion  - Case Request    2. Gastroesophageal reflux disease without esophagitis  Intermittent reflux symptoms for the past few years.  Gets symptoms at least once or twice per week.  Takes occasional Tums  Given  his progressive dysphagia symptoms we will start him on a PPI daily    3. Personal history of colonic polyps  Last colonoscopy was about 2 years ago at [place].  Few polyps removed and recommended repeat colonoscopy in 5 years  He also has a family history of colon cancer and needs a colonoscopy every 5 years time  Repeat colonoscopy in 2023    4. Family hx of colon cancer  Mom had a colon cancer at age of 65      Follow Up:   Return for Follow Up after Colonscopy.    Manuel Ann MD  [facility]  6/4/2020  13:23    Please note that portions of this note may have been completed with a voice recognition program. Efforts were made to edit the dictations, but occasionally words are mistranscribed.

## 2020-06-08 PROBLEM — R13.19 ESOPHAGEAL DYSPHAGIA: Status: ACTIVE | Noted: 2020-06-08

## 2020-06-11 ENCOUNTER — LAB (OUTPATIENT)
Dept: LAB | Facility: HOSPITAL | Age: 50
End: 2020-06-11

## 2020-06-11 DIAGNOSIS — Z01.818 PREOP TESTING: ICD-10-CM

## 2020-06-11 DIAGNOSIS — Z01.818 PREOP TESTING: Primary | ICD-10-CM

## 2020-06-11 PROCEDURE — C9803 HOPD COVID-19 SPEC COLLECT: HCPCS

## 2020-06-11 PROCEDURE — U0004 COV-19 TEST NON-CDC HGH THRU: HCPCS

## 2020-06-11 PROCEDURE — U0002 COVID-19 LAB TEST NON-CDC: HCPCS

## 2020-06-12 LAB
REF LAB TEST METHOD: NORMAL
SARS-COV-2 RNA RESP QL NAA+PROBE: NOT DETECTED

## 2020-06-15 ENCOUNTER — HOSPITAL ENCOUNTER (OUTPATIENT)
Facility: HOSPITAL | Age: 50
Setting detail: HOSPITAL OUTPATIENT SURGERY
Discharge: HOME | End: 2020-06-15
Attending: INTERNAL MEDICINE | Admitting: INTERNAL MEDICINE

## 2020-06-15 ENCOUNTER — ANESTHESIA EVENT (OUTPATIENT)
Dept: GASTROENTEROLOGY | Facility: HOSPITAL | Age: 50
End: 2020-06-15

## 2020-06-15 ENCOUNTER — ANESTHESIA (OUTPATIENT)
Dept: GASTROENTEROLOGY | Facility: HOSPITAL | Age: 50
End: 2020-06-15

## 2020-06-15 VITALS
OXYGEN SATURATION: 100 % | RESPIRATION RATE: 13 BRPM | BODY MASS INDEX: 20.61 KG/M2 | SYSTOLIC BLOOD PRESSURE: 126 MMHG | WEIGHT: 136 LBS | HEIGHT: 68 IN | HEART RATE: 68 BPM | DIASTOLIC BLOOD PRESSURE: 89 MMHG | TEMPERATURE: 98.3 F

## 2020-06-15 DIAGNOSIS — R13.19 ESOPHAGEAL DYSPHAGIA: ICD-10-CM

## 2020-06-15 PROCEDURE — 25010000002 PROPOFOL 10 MG/ML EMULSION: Performed by: NURSE ANESTHETIST, CERTIFIED REGISTERED

## 2020-06-15 RX ORDER — SODIUM CHLORIDE 9 MG/ML
70 INJECTION, SOLUTION INTRAVENOUS CONTINUOUS PRN
Status: DISCONTINUED | OUTPATIENT
Start: 2020-06-15 | End: 2020-06-15 | Stop reason: HOSPADM

## 2020-06-15 RX ORDER — LIDOCAINE HYDROCHLORIDE 20 MG/ML
INJECTION, SOLUTION INTRAVENOUS AS NEEDED
Status: DISCONTINUED | OUTPATIENT
Start: 2020-06-15 | End: 2020-06-15 | Stop reason: SURG

## 2020-06-15 RX ORDER — PROPOFOL 10 MG/ML
VIAL (ML) INTRAVENOUS AS NEEDED
Status: DISCONTINUED | OUTPATIENT
Start: 2020-06-15 | End: 2020-06-15 | Stop reason: SURG

## 2020-06-15 RX ORDER — SODIUM CHLORIDE 0.9 % (FLUSH) 0.9 %
10 SYRINGE (ML) INJECTION AS NEEDED
Status: DISCONTINUED | OUTPATIENT
Start: 2020-06-15 | End: 2020-06-15 | Stop reason: HOSPADM

## 2020-06-15 RX ADMIN — PROPOFOL 100 MG: 10 INJECTION, EMULSION INTRAVENOUS at 08:33

## 2020-06-15 RX ADMIN — PROPOFOL 100 MG: 10 INJECTION, EMULSION INTRAVENOUS at 08:36

## 2020-06-15 RX ADMIN — SODIUM CHLORIDE 70 ML/HR: 9 INJECTION, SOLUTION INTRAVENOUS at 07:17

## 2020-06-15 RX ADMIN — PROPOFOL 100 MG: 10 INJECTION, EMULSION INTRAVENOUS at 08:40

## 2020-06-15 RX ADMIN — LIDOCAINE HYDROCHLORIDE 80 MG: 20 INJECTION, SOLUTION INTRAVENOUS at 08:33

## 2020-06-15 NOTE — SIGNIFICANT NOTE
Pt received from [facility] after endo , not awake or responding. Stimulated by CRNA and OR RN with slight eyelid movement. Remains in PACU for continued care

## 2020-06-15 NOTE — DISCHARGE INSTRUCTIONS
- Discharge patient to home (ambulatory).   - Mechanical soft diet today.   - Continue present medications.   - Await pathology results.   - Return to my office in 4 weeks.   -Avoid NSAIDS

## 2020-06-15 NOTE — ANESTHESIA POSTPROCEDURE EVALUATION
Patient: Phani Paulson    Procedure Summary     Date:  06/15/20 Room / Location:  [facility] ENDOSCOPY 2 / [facility] ENDOSCOPY    Anesthesia Start:  0829 Anesthesia Stop:      Procedure:  ESOPHAGOGASTRODUODENOSCOPY WITH BIOPSY AND DILATATION (N/A ) Diagnosis:       Esophageal dysphagia      (Esophageal dysphagia [R13.10])    Surgeon:  Manuel Ann MD Provider:  Pranav Hannon CRNA    Anesthesia Type:  MAC ASA Status:  2          Anesthesia Type: MAC    Vitals  HR 99  Sat 97  Resp 18  \82  Temp 97.9        Post Anesthesia Care and Evaluation    Patient location during evaluation: PACU  Patient participation: complete - patient participated  Level of consciousness: lethargic  Pain score: 0  Pain management: adequate  Airway patency: patent  Anesthetic complications: No anesthetic complications  PONV Status: none  Cardiovascular status: acceptable  Respiratory status: acceptable and nasal cannula  Hydration status: acceptable    Comments: Patient remains sleepy post EGD and was taken to recovery for observation

## 2020-06-15 NOTE — ANESTHESIA PREPROCEDURE EVALUATION
Anesthesia Evaluation     Patient summary reviewed and Nursing notes reviewed   no history of anesthetic complications:  NPO Solid Status: > 8 hours  NPO Liquid Status: > 8 hours           Airway   Mallampati: I  TM distance: >3 FB  Neck ROM: full  no difficulty expected  Dental - normal exam     Pulmonary - negative pulmonary ROS and normal exam   Cardiovascular - normal exam    Rhythm: regular  Rate: normal    (+) hyperlipidemia,       Neuro/Psych- negative ROS  GI/Hepatic/Renal/Endo - negative ROS     Musculoskeletal (-) negative ROS    Abdominal  - normal exam    Abdomen: soft.  Bowel sounds: normal.   Substance History - negative use     OB/GYN negative ob/gyn ROS         Other - negative ROS                       Anesthesia Plan    ASA 2     MAC   (Risks and benefits discussed including risk of aspiration, recall and dental damage. All patient questions answered. Will continue with POC.)  intravenous induction     Anesthetic plan, all risks, benefits, and alternatives have been provided, discussed and informed consent has been obtained with: patient.

## 2020-06-18 LAB
LAB AP CASE REPORT: NORMAL
PATH REPORT.FINAL DX SPEC: NORMAL

## 2020-06-19 ENCOUNTER — TELEPHONE (OUTPATIENT)
Dept: SURGERY | Facility: CLINIC | Age: 50
End: 2020-06-19

## 2020-06-19 DIAGNOSIS — R13.19 ESOPHAGEAL DYSPHAGIA: Primary | ICD-10-CM

## 2020-06-19 DIAGNOSIS — K20.0 EOSINOPHILIC ESOPHAGITIS: ICD-10-CM

## 2020-06-19 NOTE — TELEPHONE ENCOUNTER
His esophageal biopsies were more in favor of eosinophilic esophagitis.  He is doing better now after the esophageal dilatation.   We will get basic food allergy profile  panel and follow-up after report

## 2020-06-23 LAB
CLAM IGE QN: <0.1 KU/L
CODFISH IGE QN: <0.1 KU/L
CONV CLASS DESCRIPTION: ABNORMAL
CORN IGE QN: 0.1 KU/L
COW MILK IGE QN: 0.66 KU/L
EGG WHITE IGE QN: <0.1 KU/L
PEANUT IGE QN: <0.1 KU/L
SCALLOP IGE QN: <0.1 KU/L
SESAME SEED IGE QN: 0.23 KU/L
SHRIMP IGE QN: <0.1 KU/L
SOYBEAN IGE QN: <0.1 KU/L
WALNUT IGE QN: <0.1 KU/L
WHEAT IGE QN: <0.1 KU/L

## 2020-06-24 ENCOUNTER — RESULTS ENCOUNTER (OUTPATIENT)
Dept: SURGERY | Facility: CLINIC | Age: 50
End: 2020-06-24

## 2020-06-24 DIAGNOSIS — R13.19 ESOPHAGEAL DYSPHAGIA: ICD-10-CM

## 2020-06-24 DIAGNOSIS — K20.0 EOSINOPHILIC ESOPHAGITIS: ICD-10-CM

## 2020-06-24 NOTE — PROGRESS NOTES
Follow Up Note     Date: 2020   Patient Name: Phani Paulson  MRN: [id number]  : 1970     Referring Physician: Annabelle Jarvis MD    Chief Complaint: Follow-up dysphagia    Interval History:   2020  Phani Paulson is a 50 y.o. male who is here today for follow up for follow-up after procedure recent.  He had an EGD done on 6/15/2020.  He had esophageal dilatation since then he is doing well.  No dysphagia episodes.  No new complaints today.  He is here to discuss the pathology report and lab work report done recently    2020  Phani Paulson is a 50 y.o. male who is here today to establish care with Gastroenterology for evaluation of dysphagia.  The patient has difficulty swallowing off and on for the last six months. The symptom is moderate in severity, occurs occasionally once in few weeks mostly associated with solid foods.  The symptoms are progressive now.  Recently he had choking with spagetti and he had to literally cough it out after some time. Whe these happens he will drink water and walk around and gets better he says. The patient points towards the upper substernal area. He deny any odynophagia. He has history of acid reflux on and off few years. Takes occasional Tums otherwise not on any medication. He may have reflux episodes once a week or so.  Deny any nausea or vomiting.  There is no associated weight loss. No h/o food allergy.   No associated abdominal pain or distension. Deny  any change in bowel habit, hematochezia or melena.   There is no history of anemia. No prior history of EGD. He had a colonoscopy 2 years ago and few polyps removed. Family history of colon cancer- Mother had colon CA at 65yrs of age. No other any GI malignancy.   Non smoker  and no ETOH abuse.     Subjective      Past Medical History:   Past Medical History:   Diagnosis Date   • Acute suppurative otitis media of both ears without spontaneous rupture of tympanic membranes    • Chicken  pox    • Lesion of skin of face     benign   • Seasonal allergies    • Wears glasses      Past Surgical History:   Past Surgical History:   Procedure Laterality Date   • COLONOSCOPY     • ENDOSCOPY N/A 6/15/2020    Procedure: ESOPHAGOGASTRODUODENOSCOPY WITH BIOPSY AND DILATATION;  Surgeon: Manuel Ann MD;  Location: [facility] ENDOSCOPY;  Service: Gastroenterology;  Laterality: N/A;   • SKIN BIOPSY         Family History:   Family History   Problem Relation Age of Onset   • Colon cancer Mother    • Hypertension Mother    • Skin cancer Father        Social History:   Social History     Socioeconomic History   • Marital status:      Spouse name: Not on file   • Number of children: Not on file   • Years of education: Not on file   • Highest education level: Not on file   Tobacco Use   • Smoking status: Never Smoker   • Smokeless tobacco: Never Used   Substance and Sexual Activity   • Alcohol use: No   • Drug use: Never   • Sexual activity: Defer       Medications:     Current Outpatient Medications:   •  Loratadine 10 MG capsule, Take 1 capsule by mouth Daily., Disp: , Rfl:   •  pantoprazole (Protonix) 40 MG EC tablet, Take 1 tablet by mouth Daily for 30 days., Disp: 30 tablet, Rfl: 1    Allergies:   No Known Allergies    Review of Systems:   Review of Systems   Constitutional: Negative for appetite change, fatigue and unexpected weight loss.   Gastrointestinal: Negative for abdominal distention, abdominal pain, anal bleeding, blood in stool, constipation, diarrhea, nausea, rectal pain, vomiting, GERD and indigestion.       The following portions of the patient's history were reviewed and updated as appropriate: allergies, current medications, past family history, past medical history, past social history, past surgical history and problem list.    Objective     Physical Exam:  Vital Signs: There were no vitals filed for this visit.    Physical Exam   Constitutional: He is oriented to person, place, and  time. Vital signs are normal. He appears well-developed and well-nourished.   HENT:   Head: Normocephalic and atraumatic.   Right Ear: External ear normal.   Left Ear: External ear normal.   Nose: Nose normal.   Mouth/Throat: Oropharynx is clear and moist.   Eyes: Conjunctivae are normal.   Neck: Normal range of motion. Neck supple.   Cardiovascular: Normal rate, regular rhythm and normal heart sounds.   Pulmonary/Chest: Effort normal and breath sounds normal.   Abdominal: Soft. Bowel sounds are normal. He exhibits no ascites and no mass.   Musculoskeletal: Normal range of motion.   Neurological: He is alert and oriented to person, place, and time.   Skin: Skin is warm and dry.   Psychiatric: He has a normal mood and affect. His behavior is normal. Judgment normal.   Nursing note and vitals reviewed.      Results Review:   I reviewed the patient's new clinical results.    Orders Only on 06/19/2020   Component Date Value Ref Range Status   • Class Description 06/19/2020 Comment   Final    Comment:     Levels of Specific IgE       Class  Description of Class      ---------------------------  -----  --------------------                     < 0.10         0         Negative             0.10 -    0.31         0/I       Equivocal/Low             0.32 -    0.55         I         Low             0.56 -    1.40         II        Moderate             1.41 -    3.90         III       High             3.91 -   19.00         IV        Very High            19.01 -  100.00         V         Very High                    >100.00         VI        Very High     • Egg White 06/19/2020 <0.10  Class 0 kU/L Final   • Peanut 06/19/2020 <0.10  Class 0 kU/L Final   • Soybean 06/19/2020 <0.10  Class 0 kU/L Final   • Milk, Cow's 06/19/2020 0.66* Class II kU/L Final   • Clams 06/19/2020 <0.10  Class 0 kU/L Final   • Shrimp 06/19/2020 <0.10  Class 0 kU/L Final   • Bailey 06/19/2020 <0.10  Class 0 kU/L Final   • CodFish 06/19/2020 <0.10  Class 0  kU/L Final   • Scallop 06/19/2020 <0.10  Class 0 kU/L Final   • Wheat 06/19/2020 <0.10  Class 0 kU/L Final   • Corn 06/19/2020 0.10* Class 0/I kU/L Final   • Sesame Seed 06/19/2020 0.23* Class 0/I kU/L Final   Admission on 06/15/2020, Discharged on 06/15/2020   Component Date Value Ref Range Status   • Case Report 06/15/2020    Final                    Value:Surgical Pathology Report                         Case: [id number]                                  Authorizing Provider:  Manuel Ann MD  Collected:           06/15/2020 08:41 AM          Ordering Location:     [facility]    Received:            06/15/2020 09:14 AM                                 [facility]                                                                    Pathologist:           Lorenzo Doan MD                                                       Specimens:   1) - Gastric, Antrum, ANTRUM AND BODY FOR HYPLORI                                                   2) - Esophagus                                                                            • Final Diagnosis 06/15/2020    Final                    Value:This result contains rich text formatting which cannot be displayed here.   Lab on 06/11/2020   Component Date Value Ref Range Status   • COVID19 06/11/2020 Not Detected  Not Detected - Ref. Range Final    See Scanned Report     Office Visit on 06/04/2020   Component Date Value Ref Range Status   • WBC 06/04/2020 5.20  3.40 - 10.80 10*3/mm3 Final   • RBC 06/04/2020 5.68  4.14 - 5.80 10*6/mm3 Final   • Hemoglobin 06/04/2020 17.2  13.0 - 17.7 g/dL Final   • Hematocrit 06/04/2020 49.7  37.5 - 51.0 % Final   • MCV 06/04/2020 87.5  79.0 - 97.0 fL Final   • MCH 06/04/2020 30.3  26.6 - 33.0 pg Final   • MCHC 06/04/2020 34.6  31.5 - 35.7 g/dL Final   • RDW 06/04/2020 13.1  12.3 - 15.4 % Final   • Platelets 06/04/2020 285  140 - 450 10*3/mm3 Final   • Neutrophil Rel % 06/04/2020 65.6  42.7 - 76.0 % Final   • Lymphocyte  Rel % 06/04/2020 17.3* 19.6 - 45.3 % Final   • Monocyte Rel % 06/04/2020 11.2  5.0 - 12.0 % Final   • Eosinophil Rel % 06/04/2020 4.0  0.3 - 6.2 % Final   • Basophil Rel % 06/04/2020 1.5  0.0 - 1.5 % Final   • Neutrophils Absolute 06/04/2020 3.41  1.70 - 7.00 10*3/mm3 Final   • Lymphocytes Absolute 06/04/2020 0.90  0.70 - 3.10 10*3/mm3 Final   • Monocytes Absolute 06/04/2020 0.58  0.10 - 0.90 10*3/mm3 Final   • Eosinophils Absolute 06/04/2020 0.21  0.00 - 0.40 10*3/mm3 Final   • Basophils Absolute 06/04/2020 0.08  0.00 - 0.20 10*3/mm3 Final   • Immature Granulocyte Rel % 06/04/2020 0.4  0.0 - 0.5 % Final   • Immature Grans Absolute 06/04/2020 0.02  0.00 - 0.05 10*3/mm3 Final   • nRBC 06/04/2020 0.0  0.0 - 0.2 /100 WBC Final   • Glucose 06/04/2020 97  65 - 99 mg/dL Final   • BUN 06/04/2020 13  6 - 20 mg/dL Final   • Creatinine 06/04/2020 1.08  0.76 - 1.27 mg/dL Final   • eGFR Non African Am 06/04/2020 73  >60 mL/min/1.73 Final   • eGFR African Am 06/04/2020 88  >60 mL/min/1.73 Final   • BUN/Creatinine Ratio 06/04/2020 12.0  7.0 - 25.0 Final   • Sodium 06/04/2020 136  136 - 145 mmol/L Final   • Potassium 06/04/2020 4.6  3.5 - 5.2 mmol/L Final   • Chloride 06/04/2020 103  98 - 107 mmol/L Final   • Total CO2 06/04/2020 26.1  22.0 - 29.0 mmol/L Final   • Calcium 06/04/2020 9.4  8.6 - 10.5 mg/dL Final   • Total Protein 06/04/2020 7.4  6.0 - 8.5 g/dL Final   • Albumin 06/04/2020 4.70  3.50 - 5.20 g/dL Final   • Globulin 06/04/2020 2.7  gm/dL Final   • A/G Ratio 06/04/2020 1.7  g/dL Final   • Total Bilirubin 06/04/2020 1.0  0.2 - 1.2 mg/dL Final   • Alkaline Phosphatase 06/04/2020 70  39 - 117 U/L Final   • AST (SGOT) 06/04/2020 11  1 - 40 U/L Final   • ALT (SGPT) 06/04/2020 12  1 - 41 U/L Final   • Total Cholesterol 06/04/2020 175  0 - 200 mg/dL Final   • Triglycerides 06/04/2020 65  0 - 150 mg/dL Final   • HDL Cholesterol 06/04/2020 53  40 - 60 mg/dL Final   • VLDL Cholesterol 06/04/2020 13  mg/dL Final   • LDL  Cholesterol  06/04/2020 109* 0 - 100 mg/dL Final   • Hemoglobin A1C 06/04/2020 5.00  4.80 - 5.60 % Final    Comment: Hemoglobin A1C Ranges:  Increased Risk for Diabetes  5.7% to 6.4%  Diabetes                     >= 6.5%  Diabetic Goal                < 7.0%     • TSH 06/04/2020 1.180  0.270 - 4.200 uIU/mL Final   • Vitamin B-12 06/04/2020 350  211 - 946 pg/mL Final    Results may be falsely increased if patient taking Biotin.   • 25 Hydroxy, Vitamin D 06/04/2020 30.7  30.0 - 100.0 ng/ml Final    Comment: Results may be falsely increased if patient taking Biotin.  Reference Range for Total Vitamin D 25(OH)  Deficiency <20.0 ng/mL  Insufficiency 21-29 ng/mL  Sufficiency  ng/mL  Toxicity >100 ng/ml        No radiology results for the last 90 days.    Assessment / Plan       1.  Eosinophilic esophagitis  He had an EGD done on 6/15/2020.  EGD revealed a mild upper esophageal stricture.  He had a esophageal savory dilatation performed and had multiple biopsies obtained to rule out EOE.  His biopsies revealed a 38 eosinophils per high-power field suggesting eosinophilic esophagitis.  He did not have any classical signs of EOE and endoscopy however he did have a mild upper esophageal narrowing.  EGD also showed small hiatal hernia.  Gastric biopsies were negative for H. pylori.  Lab reports and pathology reports have been discussed with the patient.   His food allergy profile came out positive for allergy to cows milk, corn, sesame seed  I have advised to avoid about.  We will treat him with high-dose PPI for 8 weeks to rule out PPI responsive EOE  We will repeat the EGD after 8 weeks treatment with high-dose PPI  Will recommend further based on the repeat biopsy report     2.  Esophageal stricture  Mild upper esophageal stricture status post esophageal dilatation with a savory dilator  Suspected EOE with esophageal stricture.   No more dysphagia since he had a dilatation.    3. Gastroesophageal reflux disease without  esophagitis  Well-controlled with the PPI daily now    Prior history  3. Personal history of colonic polyps  Last colonoscopy was about 2 years ago at [facility].  Few polyps removed and recommended repeat colonoscopy in 5 years  He also has a family history of colon cancer and needs a colonoscopy every 5 years time  Repeat colonoscopy in 2023     4. Family hx of colon cancer  Mom had a colon cancer at age of 65    Follow Up:   No follow-ups on file.    Manuel Ann MD  [facility]  6/24/2020  08:34     Please note that portions of this note may have been completed with a voice recognition program. Efforts were made to edit the dictations, but occasionally words are mistranscribed.

## 2020-06-25 ENCOUNTER — TELEPHONE (OUTPATIENT)
Dept: GASTROENTEROLOGY | Facility: CLINIC | Age: 50
End: 2020-06-25

## 2020-06-25 ENCOUNTER — OFFICE VISIT (OUTPATIENT)
Dept: GASTROENTEROLOGY | Facility: CLINIC | Age: 50
End: 2020-06-25

## 2020-06-25 VITALS
TEMPERATURE: 99.2 F | RESPIRATION RATE: 18 BRPM | BODY MASS INDEX: 20.88 KG/M2 | HEIGHT: 68 IN | DIASTOLIC BLOOD PRESSURE: 80 MMHG | HEART RATE: 68 BPM | OXYGEN SATURATION: 98 % | SYSTOLIC BLOOD PRESSURE: 110 MMHG | WEIGHT: 137.8 LBS

## 2020-06-25 DIAGNOSIS — Z01.818 PREOP TESTING: Primary | ICD-10-CM

## 2020-06-25 DIAGNOSIS — K20.0 ESOPHAGITIS, EOSINOPHILIC: Primary | ICD-10-CM

## 2020-06-25 PROCEDURE — 99213 OFFICE O/P EST LOW 20 MIN: CPT | Performed by: INTERNAL MEDICINE

## 2020-06-25 RX ORDER — PANTOPRAZOLE SODIUM 40 MG/1
40 TABLET, DELAYED RELEASE ORAL 2 TIMES DAILY
Qty: 60 TABLET | Refills: 1 | Status: SHIPPED | OUTPATIENT
Start: 2020-06-25 | End: 2020-07-25

## 2020-06-25 RX ORDER — SODIUM CHLORIDE 9 MG/ML
70 INJECTION, SOLUTION INTRAVENOUS CONTINUOUS PRN
Status: CANCELLED | OUTPATIENT
Start: 2020-08-21

## 2020-06-30 PROBLEM — K20.0 ESOPHAGITIS, EOSINOPHILIC: Status: ACTIVE | Noted: 2020-06-30

## 2020-08-18 ENCOUNTER — LAB (OUTPATIENT)
Dept: LAB | Facility: HOSPITAL | Age: 50
End: 2020-08-18

## 2020-08-18 DIAGNOSIS — Z01.818 PREOP TESTING: ICD-10-CM

## 2020-08-18 PROCEDURE — U0004 COV-19 TEST NON-CDC HGH THRU: HCPCS

## 2020-08-18 PROCEDURE — C9803 HOPD COVID-19 SPEC COLLECT: HCPCS

## 2020-08-18 PROCEDURE — U0002 COVID-19 LAB TEST NON-CDC: HCPCS

## 2020-08-19 LAB
REF LAB TEST METHOD: NORMAL
SARS-COV-2 RNA RESP QL NAA+PROBE: NOT DETECTED

## 2020-08-21 ENCOUNTER — ANESTHESIA (OUTPATIENT)
Dept: GASTROENTEROLOGY | Facility: HOSPITAL | Age: 50
End: 2020-08-21

## 2020-08-21 ENCOUNTER — HOSPITAL ENCOUNTER (OUTPATIENT)
Facility: HOSPITAL | Age: 50
Setting detail: HOSPITAL OUTPATIENT SURGERY
Discharge: HOME | End: 2020-08-21
Attending: INTERNAL MEDICINE | Admitting: INTERNAL MEDICINE

## 2020-08-21 ENCOUNTER — ANESTHESIA EVENT (OUTPATIENT)
Dept: GASTROENTEROLOGY | Facility: HOSPITAL | Age: 50
End: 2020-08-21

## 2020-08-21 VITALS
RESPIRATION RATE: 16 BRPM | DIASTOLIC BLOOD PRESSURE: 81 MMHG | HEIGHT: 63 IN | OXYGEN SATURATION: 98 % | WEIGHT: 131.5 LBS | BODY MASS INDEX: 23.3 KG/M2 | SYSTOLIC BLOOD PRESSURE: 119 MMHG | HEART RATE: 76 BPM | TEMPERATURE: 97.5 F

## 2020-08-21 DIAGNOSIS — K20.0 ESOPHAGITIS, EOSINOPHILIC: ICD-10-CM

## 2020-08-21 PROCEDURE — 43239 EGD BIOPSY SINGLE/MULTIPLE: CPT | Performed by: INTERNAL MEDICINE

## 2020-08-21 PROCEDURE — 25010000002 PROPOFOL 200 MG/20ML EMULSION: Performed by: NURSE ANESTHETIST, CERTIFIED REGISTERED

## 2020-08-21 RX ORDER — PROPOFOL 10 MG/ML
INJECTION, EMULSION INTRAVENOUS AS NEEDED
Status: DISCONTINUED | OUTPATIENT
Start: 2020-08-21 | End: 2020-08-21 | Stop reason: SURG

## 2020-08-21 RX ORDER — SODIUM CHLORIDE 9 MG/ML
70 INJECTION, SOLUTION INTRAVENOUS CONTINUOUS PRN
Status: DISCONTINUED | OUTPATIENT
Start: 2020-08-21 | End: 2020-08-21 | Stop reason: HOSPADM

## 2020-08-21 RX ORDER — SIMETHICONE 20 MG/.3ML
EMULSION ORAL AS NEEDED
Status: DISCONTINUED | OUTPATIENT
Start: 2020-08-21 | End: 2020-08-21 | Stop reason: HOSPADM

## 2020-08-21 RX ORDER — MAGNESIUM HYDROXIDE 1200 MG/15ML
LIQUID ORAL AS NEEDED
Status: DISCONTINUED | OUTPATIENT
Start: 2020-08-21 | End: 2020-08-21 | Stop reason: HOSPADM

## 2020-08-21 RX ORDER — LIDOCAINE HYDROCHLORIDE 20 MG/ML
INJECTION, SOLUTION INTRAVENOUS AS NEEDED
Status: DISCONTINUED | OUTPATIENT
Start: 2020-08-21 | End: 2020-08-21 | Stop reason: SURG

## 2020-08-21 RX ORDER — PANTOPRAZOLE SODIUM 40 MG/1
40 TABLET, DELAYED RELEASE ORAL 2 TIMES DAILY
COMMUNITY
End: 2020-09-24

## 2020-08-21 RX ADMIN — SODIUM CHLORIDE 70 ML/HR: 9 INJECTION, SOLUTION INTRAVENOUS at 07:06

## 2020-08-21 RX ADMIN — PROPOFOL 100 MG: 10 INJECTION, EMULSION INTRAVENOUS at 08:23

## 2020-08-21 RX ADMIN — LIDOCAINE HYDROCHLORIDE 60 MG: 20 INJECTION, SOLUTION INTRAVENOUS at 08:26

## 2020-08-21 RX ADMIN — PROPOFOL 100 MG: 10 INJECTION, EMULSION INTRAVENOUS at 08:26

## 2020-08-21 RX ADMIN — LIDOCAINE HYDROCHLORIDE 60 MG: 20 INJECTION, SOLUTION INTRAVENOUS at 08:19

## 2020-08-21 NOTE — H&P
[facility]  HISTORY AND PHYSICAL    Patient Name: Phani Paulson  : 1970  MRN: [id number]    Chief Complaint:   For EGD    History Of Presenting Illness:      Known EoE  For Surveillance EGD after high dose PPI     Past Medical History:   Diagnosis Date   • Acute suppurative otitis media of both ears without spontaneous rupture of tympanic membranes    • Chicken pox    • Esophageal stricture    • GERD (gastroesophageal reflux disease)    • Lesion of skin of face     benign   • Seasonal allergies    • Wears glasses        Past Surgical History:   Procedure Laterality Date   • COLONOSCOPY     • ENDOSCOPY N/A 6/15/2020    Procedure: ESOPHAGOGASTRODUODENOSCOPY WITH BIOPSY AND DILATATION;  Surgeon: Manuel Ann MD;  Location: [facility] ENDOSCOPY;  Service: Gastroenterology;  Laterality: N/A;   • SKIN BIOPSY         Social History     Socioeconomic History   • Marital status:      Spouse name: Not on file   • Number of children: Not on file   • Years of education: Not on file   • Highest education level: Not on file   Tobacco Use   • Smoking status: Never Smoker   • Smokeless tobacco: Never Used   Substance and Sexual Activity   • Alcohol use: No   • Drug use: Never   • Sexual activity: Defer       Family History   Problem Relation Age of Onset   • Colon cancer Mother    • Hypertension Mother    • Skin cancer Father        Prior to Admission Medications:  Medications Prior to Admission   Medication Sig Dispense Refill Last Dose   • Loratadine 10 MG capsule Take 1 capsule by mouth Daily.   2020 at 0800   • pantoprazole (PROTONIX) 20 MG EC tablet Take 20 mg by mouth Daily.   Past Week at Unknown time       Allergies:  No Known Allergies     Vitals: Temp:  [97.5 °F (36.4 °C)] 97.5 °F (36.4 °C)  Heart Rate:  [68] 68  Resp:  [16] 16  BP: (121)/(85) 121/85    Review Of Systems:  Constitutional:  Negative for chills, fever, and unexpected weight change.  Respiratory:  Negative for  cough, chest tightness, shortness of breath, and wheezing.  Cardiovascular:  Negative for chest pain, palpitations, and leg swelling.  Gastrointestinal:  Negative for abdominal distention, abdominal pain, Nausea, vomiting.  Neurological:  Negative for Weakness, numbness, and headaches.     Physical Exam:    General Appearance:  Alert, cooperative, in no acute distress.   Lungs:   Clear to auscultation, respirations regular, even and                 unlabored.   Heart:  Regular rhythm and normal rate.   Abdomen:   Normal bowel sounds, no masses, no organomegaly. Soft, non-tender, non-distended   Neurologic: Alert and oriented x 3. Moves all four limbs equally       Plan: ESOPHAGOGASTRODUODENOSCOPY (N/A)     Manuel Ann MD  8/21/2020

## 2020-08-21 NOTE — ANESTHESIA POSTPROCEDURE EVALUATION
Patient: Phani Paulson    Procedure Summary     Date:  08/21/20 Room / Location:  [facility] ENDOSCOPY 2 / [facility] ENDOSCOPY    Anesthesia Start:  0817 Anesthesia Stop:  0830    Procedure:  ESOPHAGOGASTRODUODENOSCOPY WITH BIOPSY (N/A Esophagus) Diagnosis:       Esophagitis, eosinophilic      (Esophagitis, eosinophilic [K20.0])    Surgeon:  Manuel Ann MD Provider:  Bob Putnam CRNA    Anesthesia Type:  MAC ASA Status:  2          Anesthesia Type: MAC    Vitals  Vitals Value Taken Time   /80 8/21/2020  8:36 AM   Temp 97.5 °F (36.4 °C) 8/21/2020  8:36 AM   Pulse 91 8/21/2020  8:36 AM   Resp 15 8/21/2020  8:36 AM   SpO2 96 % 8/21/2020  8:36 AM           Post Anesthesia Care and Evaluation    Patient location during evaluation: bedside  Patient participation: complete - patient participated  Level of consciousness: awake and alert  Pain score: 0  Pain management: satisfactory to patient  Airway patency: patent  Anesthetic complications: No anesthetic complications  PONV Status: none  Cardiovascular status: acceptable and hemodynamically stable  Respiratory status: acceptable  Hydration status: acceptable

## 2020-08-21 NOTE — ANESTHESIA PREPROCEDURE EVALUATION
Anesthesia Evaluation     Patient summary reviewed and Nursing notes reviewed   NPO Solid Status: > 8 hours  NPO Liquid Status: > 8 hours           Airway   Mallampati: I  TM distance: >3 FB  Neck ROM: full  No difficulty expected  Dental - normal exam     Pulmonary - negative pulmonary ROS and normal exam   Cardiovascular - normal exam  Exercise tolerance: good (4-7 METS)    (+) hyperlipidemia,       Neuro/Psych- negative ROS  GI/Hepatic/Renal/Endo    (+)  GERD,      Musculoskeletal (-) negative ROS    Abdominal  - normal exam    Bowel sounds: normal.   Substance History - negative use     OB/GYN negative ob/gyn ROS         Other                        Anesthesia Plan    ASA 2     MAC     intravenous induction     Anesthetic plan, all risks, benefits, and alternatives have been provided, discussed and informed consent has been obtained with: patient.    Plan discussed with attending.

## 2020-08-26 LAB
LAB AP CASE REPORT: NORMAL
PATH REPORT.FINAL DX SPEC: NORMAL

## 2020-09-24 ENCOUNTER — OFFICE VISIT (OUTPATIENT)
Dept: GASTROENTEROLOGY | Facility: CLINIC | Age: 50
End: 2020-09-24

## 2020-09-24 VITALS
RESPIRATION RATE: 12 BRPM | TEMPERATURE: 97.7 F | WEIGHT: 137 LBS | HEIGHT: 66 IN | HEART RATE: 84 BPM | BODY MASS INDEX: 22.02 KG/M2 | DIASTOLIC BLOOD PRESSURE: 94 MMHG | SYSTOLIC BLOOD PRESSURE: 121 MMHG

## 2020-09-24 DIAGNOSIS — Z01.818 PREOP TESTING: Primary | ICD-10-CM

## 2020-09-24 DIAGNOSIS — K20.0 ESOPHAGITIS, EOSINOPHILIC: Primary | ICD-10-CM

## 2020-09-24 PROCEDURE — 99213 OFFICE O/P EST LOW 20 MIN: CPT | Performed by: INTERNAL MEDICINE

## 2020-09-24 RX ORDER — PANTOPRAZOLE SODIUM 40 MG/1
40 TABLET, DELAYED RELEASE ORAL DAILY
Qty: 90 TABLET | Refills: 3 | Status: SHIPPED | OUTPATIENT
Start: 2020-09-24 | End: 2020-10-24

## 2020-09-24 RX ORDER — VALACYCLOVIR HYDROCHLORIDE 500 MG/1
500 TABLET, FILM COATED ORAL AS NEEDED
COMMUNITY
End: 2020-12-10

## 2020-09-24 RX ORDER — SODIUM CHLORIDE 9 MG/ML
70 INJECTION, SOLUTION INTRAVENOUS CONTINUOUS PRN
Status: CANCELLED | OUTPATIENT
Start: 2020-09-24

## 2020-09-24 NOTE — PROGRESS NOTES
Follow Up Note     Date: 2020   Patient Name: Phani Paulson  MRN: [id number]  : 1970     Referring Physician: Annabelle Jarvis MD    Chief Complaint:    Chief Complaint   Patient presents with   • Follow-up   • Difficulty Swallowing   • Heartburn       Interval History:   2020  Phani Paulson is a 50 y.o. male who is here today for follow up for his dysphagia.  He states that he does not have any problem swallowing.  He was on high-dose PPI for 3 months now.  He is here to discuss the further follow-up and management    2020  Phani Paulson is a 50 y.o. male who is here today for follow up for follow-up after procedure recent.  He had an EGD done on 6/15/2020.  He had esophageal dilatation since then he is doing well.  No dysphagia episodes.  No new complaints today.  He is here to discuss the pathology report and lab work report done recently     2020  Phani Paulson is a 50 y.o. male who is here today to establish care with Gastroenterology for evaluation of dysphagia.  The patient has difficulty swallowing off and on for the last six months. The symptom is moderate in severity, occurs occasionally once in few weeks mostly associated with solid foods.  The symptoms are progressive now.  Recently he had choking with spagetti and he had to literally cough it out after some time. Whe these happens he will drink water and walk around and gets better he says. The patient points towards the upper substernal area. He deny any odynophagia. He has history of acid reflux on and off few years. Takes occasional Tums otherwise not on any medication. He may have reflux episodes once a week or so.  Deny any nausea or vomiting.  There is no associated weight loss. No h/o food allergy.   No associated abdominal pain or distension. Deny  any change in bowel habit, hematochezia or melena.   There is no history of anemia. No prior history of EGD. He had a colonoscopy 2 years ago and  few polyps removed. Family history of colon cancer- Mother had colon CA at 65yrs of age. No other any GI malignancy.   Non smoker  and no ETOH abuse.     Subjective      Past Medical History:   Past Medical History:   Diagnosis Date   • Acute suppurative otitis media of both ears without spontaneous rupture of tympanic membranes    • Chicken pox    • Esophageal stricture    • GERD (gastroesophageal reflux disease)    • Lesion of skin of face     benign   • Seasonal allergies    • Wears glasses      Past Surgical History:   Past Surgical History:   Procedure Laterality Date   • COLONOSCOPY     • ENDOSCOPY N/A 6/15/2020    Procedure: ESOPHAGOGASTRODUODENOSCOPY WITH BIOPSY AND DILATATION;  Surgeon: Manuel Ann MD;  Location: [facility] ENDOSCOPY;  Service: Gastroenterology;  Laterality: N/A;   • ENDOSCOPY N/A 8/21/2020    Procedure: ESOPHAGOGASTRODUODENOSCOPY WITH BIOPSY;  Surgeon: Manuel Ann MD;  Location: [facility] ENDOSCOPY;  Service: Gastroenterology;  Laterality: N/A;   • SKIN BIOPSY         Family History:   Family History   Problem Relation Age of Onset   • Colon cancer Mother    • Hypertension Mother    • Skin cancer Father    • Cirrhosis Neg Hx    • Liver cancer Neg Hx    • Liver disease Neg Hx        Social History:   Social History     Socioeconomic History   • Marital status:      Spouse name: Not on file   • Number of children: Not on file   • Years of education: Not on file   • Highest education level: Not on file   Tobacco Use   • Smoking status: Never Smoker   • Smokeless tobacco: Never Used   Substance and Sexual Activity   • Alcohol use: No   • Drug use: Never   • Sexual activity: Defer       Medications:     Current Outpatient Medications:   •  Loratadine 10 MG capsule, Take 1 capsule by mouth Daily., Disp: , Rfl:   •  pantoprazole (PROTONIX) 40 MG EC tablet, Take 40 mg by mouth 2 (two) times a day., Disp: , Rfl:   •  valACYclovir (VALTREX) 500 MG tablet, Take 500 mg by mouth  "As Needed., Disp: , Rfl:     Allergies:   No Known Allergies    Review of Systems:   Review of Systems   Constitutional: Negative for appetite change, fatigue and unexpected weight loss.   HENT: Negative for trouble swallowing.    Gastrointestinal: Negative for abdominal distention, abdominal pain, anal bleeding, blood in stool, constipation, diarrhea, nausea, rectal pain, vomiting, GERD and indigestion.       The following portions of the patient's history were reviewed and updated as appropriate: allergies, current medications, past family history, past medical history, past social history, past surgical history and problem list.    Objective     Physical Exam:  Vital Signs:   Vitals:    09/24/20 1523   BP: 121/94   Pulse: 84   Resp: 12   Temp: 97.7 °F (36.5 °C)   Weight: 62.1 kg (137 lb)   Height: 167.6 cm (66\")       Physical Exam  Vitals signs and nursing note reviewed.   Constitutional:       Appearance: Normal appearance. He is well-developed.   HENT:      Head: Normocephalic and atraumatic.      Right Ear: External ear normal.      Left Ear: External ear normal.   Eyes:      Conjunctiva/sclera: Conjunctivae normal.      Pupils: Pupils are equal, round, and reactive to light.   Neck:      Musculoskeletal: Normal range of motion.      Thyroid: No thyromegaly.      Trachea: No tracheal deviation.   Cardiovascular:      Rate and Rhythm: Normal rate and regular rhythm.      Heart sounds: No murmur.   Pulmonary:      Effort: Pulmonary effort is normal. No respiratory distress.      Breath sounds: Normal breath sounds.   Abdominal:      General: Bowel sounds are normal. There is no distension.      Palpations: Abdomen is soft. There is no mass.      Tenderness: There is no abdominal tenderness.      Hernia: No hernia is present.   Musculoskeletal: Normal range of motion.   Skin:     General: Skin is warm and dry.   Neurological:      General: No focal deficit present.      Mental Status: He is alert and oriented to " person, place, and time.      Cranial Nerves: No cranial nerve deficit.      Sensory: No sensory deficit.   Psychiatric:         Behavior: Behavior normal.         Thought Content: Thought content normal.         Judgment: Judgment normal.         Results Review:   I reviewed the patient's new clinical results.    Admission on 08/21/2020, Discharged on 08/21/2020   Component Date Value Ref Range Status   • Case Report 08/21/2020    Final                    Value:Surgical Pathology Report                         Case: [id number]                                  Authorizing Provider:  Manuel Ann MD  Collected:           08/21/2020 08:27 AM          Ordering Location:     [facility]    Received:            08/21/2020 09:18 AM                                 [facility]                                                                    Pathologist:           Lorenzo Khan MD                                                             Specimen:    Esophagus, DISTAL, MID AND PROXIMAL ESOPHAGUS BIOPSY                                      • Final Diagnosis 08/21/2020    Final                    Value:This result contains rich text formatting which cannot be displayed here.   Lab on 08/18/2020   Component Date Value Ref Range Status   • Reference Lab Report 08/18/2020 See Scanned Report   Final   • COVID19 08/18/2020 Not Detected  Not Detected - Ref. Range Final      No radiology results for the last 90 days.    Assessment / Plan      1.  Eosinophilic esophagitis  9/24/2000  He denies any dysphagia.  No new symptoms.  Already finished 3 months of high-dose PPI  We will continue with the Protonix 40 g p.o. daily  We will schedule him for an EGD and repeat biopsy to assess for response to treatment and to assess for PPI responsive EOE and will decide on further management.[facility] [place]  The indications, technique, alternatives and potential risk and complications were discussed with the patient including but not  limited to bleeding, bowel perforations, missing lesions and anesthetic complications. The patient understands and wishes to proceed with the procedure and has given their verbal consent. Written patient education information was given to the patient.   The patient will call if they have further questions before procedure.       6/24/2020  He had an EGD done on 6/15/2020.  EGD revealed a mild upper esophageal stricture.  He had a esophageal savory dilatation performed and had multiple biopsies obtained to rule out EOE.  His biopsies revealed a 38 eosinophils per high-power field suggesting eosinophilic esophagitis.  He did not have any classical signs of EOE and endoscopy however he did have a mild upper esophageal narrowing.  EGD also showed small hiatal hernia.  Gastric biopsies were negative for H. pylori.  Lab reports and pathology reports have been discussed with the patient.   His food allergy profile came out positive for allergy to cows milk, corn, sesame seed  I have advised to avoid about.  We will treat him with high-dose PPI for 8 weeks to rule out PPI responsive EOE  We will repeat the EGD after 8 weeks treatment with high-dose PPI  Will recommend further based on the repeat biopsy report     2.  Esophageal stricture  Mild upper esophageal stricture from EOE status post esophageal dilatation with a savory dilator  No further issues with the swallowing     3. Gastroesophageal reflux disease without esophagitis  Asymptomatic now with the PPI     Prior history  3. Personal history of colonic polyps  Last colonoscopy was about 2 years ago at [facility].  Few polyps removed and recommended repeat colonoscopy in 5 years  He also has a family history of colon cancer and needs a colonoscopy every 5 years time  Repeat colonoscopy in 2023     4. Family hx of colon cancer  Mom had a colon cancer at age of 65        Follow Up:   No follow-ups on file.    Manuel Ann MD  Gastroenterology  [place]  9/24/2020  15:25 EDT     Please note that portions of this note may have been completed with a voice recognition program.

## 2020-10-27 ENCOUNTER — LAB (OUTPATIENT)
Dept: LAB | Facility: HOSPITAL | Age: 50
End: 2020-10-27

## 2020-10-27 DIAGNOSIS — Z01.818 PREOP TESTING: ICD-10-CM

## 2020-10-27 PROCEDURE — U0004 COV-19 TEST NON-CDC HGH THRU: HCPCS | Performed by: INTERNAL MEDICINE

## 2020-10-27 PROCEDURE — C9803 HOPD COVID-19 SPEC COLLECT: HCPCS

## 2020-10-27 RX ORDER — PANTOPRAZOLE SODIUM 40 MG/1
40 TABLET, DELAYED RELEASE ORAL DAILY
COMMUNITY
End: 2021-10-21 | Stop reason: SDUPTHER

## 2020-10-28 LAB — SARS-COV-2 RNA RESP QL NAA+PROBE: NOT DETECTED

## 2020-10-30 ENCOUNTER — ANESTHESIA (OUTPATIENT)
Dept: GASTROENTEROLOGY | Facility: HOSPITAL | Age: 50
End: 2020-10-30

## 2020-10-30 ENCOUNTER — HOSPITAL ENCOUNTER (OUTPATIENT)
Facility: HOSPITAL | Age: 50
Setting detail: HOSPITAL OUTPATIENT SURGERY
Discharge: HOME | End: 2020-10-30
Attending: INTERNAL MEDICINE | Admitting: INTERNAL MEDICINE

## 2020-10-30 ENCOUNTER — ANESTHESIA EVENT (OUTPATIENT)
Dept: GASTROENTEROLOGY | Facility: HOSPITAL | Age: 50
End: 2020-10-30

## 2020-10-30 VITALS
TEMPERATURE: 97.7 F | SYSTOLIC BLOOD PRESSURE: 118 MMHG | DIASTOLIC BLOOD PRESSURE: 86 MMHG | WEIGHT: 137 LBS | RESPIRATION RATE: 16 BRPM | HEART RATE: 77 BPM | OXYGEN SATURATION: 98 % | HEIGHT: 66 IN | BODY MASS INDEX: 22.02 KG/M2

## 2020-10-30 DIAGNOSIS — K20.0 ESOPHAGITIS, EOSINOPHILIC: ICD-10-CM

## 2020-10-30 PROCEDURE — 25010000002 PROPOFOL 200 MG/20ML EMULSION: Performed by: NURSE ANESTHETIST, CERTIFIED REGISTERED

## 2020-10-30 PROCEDURE — 25010000002 ONDANSETRON PER 1 MG: Performed by: NURSE ANESTHETIST, CERTIFIED REGISTERED

## 2020-10-30 PROCEDURE — 43239 EGD BIOPSY SINGLE/MULTIPLE: CPT | Performed by: INTERNAL MEDICINE

## 2020-10-30 RX ORDER — ONDANSETRON 2 MG/ML
INJECTION INTRAMUSCULAR; INTRAVENOUS AS NEEDED
Status: DISCONTINUED | OUTPATIENT
Start: 2020-10-30 | End: 2020-10-30 | Stop reason: SURG

## 2020-10-30 RX ORDER — LIDOCAINE HYDROCHLORIDE 20 MG/ML
INJECTION, SOLUTION INTRAVENOUS AS NEEDED
Status: DISCONTINUED | OUTPATIENT
Start: 2020-10-30 | End: 2020-10-30 | Stop reason: SURG

## 2020-10-30 RX ORDER — PROPOFOL 10 MG/ML
INJECTION, EMULSION INTRAVENOUS AS NEEDED
Status: DISCONTINUED | OUTPATIENT
Start: 2020-10-30 | End: 2020-10-30 | Stop reason: SURG

## 2020-10-30 RX ORDER — SODIUM CHLORIDE 9 MG/ML
70 INJECTION, SOLUTION INTRAVENOUS CONTINUOUS PRN
Status: DISCONTINUED | OUTPATIENT
Start: 2020-10-30 | End: 2020-10-30 | Stop reason: HOSPADM

## 2020-10-30 RX ADMIN — LIDOCAINE HYDROCHLORIDE 60 MG: 20 INJECTION, SOLUTION INTRAVENOUS at 08:12

## 2020-10-30 RX ADMIN — PROPOFOL 100 MG: 10 INJECTION, EMULSION INTRAVENOUS at 08:12

## 2020-10-30 RX ADMIN — SODIUM CHLORIDE 70 ML/HR: 9 INJECTION, SOLUTION INTRAVENOUS at 07:00

## 2020-10-30 RX ADMIN — ONDANSETRON 4 MG: 2 INJECTION INTRAMUSCULAR; INTRAVENOUS at 08:22

## 2020-10-30 RX ADMIN — PROPOFOL 100 MG: 10 INJECTION, EMULSION INTRAVENOUS at 08:18

## 2020-10-30 NOTE — ANESTHESIA PREPROCEDURE EVALUATION
Anesthesia Evaluation     Patient summary reviewed and Nursing notes reviewed   no history of anesthetic complications:  NPO Solid Status: > 8 hours  NPO Liquid Status: > 8 hours           Airway   Mallampati: II  TM distance: >3 FB  Neck ROM: full  No difficulty expected  Dental - normal exam     Pulmonary - negative pulmonary ROS and normal exam   (-) not a smoker  Cardiovascular - normal exam  Exercise tolerance: good (4-7 METS)    (+) hyperlipidemia,       Neuro/Psych- negative ROS  GI/Hepatic/Renal/Endo    (+)  GERD poorly controlled,      Musculoskeletal (-) negative ROS    Abdominal    Substance History - negative use     OB/GYN negative ob/gyn ROS         Other - negative ROS                       Anesthesia Plan    ASA 3     MAC   (Risks and benefits discussed including risk of aspiration, recall and dental damage. All patient questions answered. Will continue with POC.)  intravenous induction     Anesthetic plan, all risks, benefits, and alternatives have been provided, discussed and informed consent has been obtained with: patient.    Plan discussed with CRNA.

## 2020-11-03 LAB
LAB AP CASE REPORT: NORMAL
PATH REPORT.FINAL DX SPEC: NORMAL

## 2020-12-10 ENCOUNTER — OFFICE VISIT (OUTPATIENT)
Dept: GASTROENTEROLOGY | Facility: CLINIC | Age: 50
End: 2020-12-10

## 2020-12-10 VITALS
TEMPERATURE: 98.4 F | HEIGHT: 66 IN | SYSTOLIC BLOOD PRESSURE: 136 MMHG | HEART RATE: 75 BPM | WEIGHT: 134 LBS | DIASTOLIC BLOOD PRESSURE: 78 MMHG | BODY MASS INDEX: 21.53 KG/M2 | RESPIRATION RATE: 12 BRPM

## 2020-12-10 DIAGNOSIS — K20.0 ESOPHAGITIS, EOSINOPHILIC: Primary | ICD-10-CM

## 2020-12-10 DIAGNOSIS — K22.2 ESOPHAGEAL STRICTURE: ICD-10-CM

## 2020-12-10 PROCEDURE — 99213 OFFICE O/P EST LOW 20 MIN: CPT | Performed by: INTERNAL MEDICINE

## 2020-12-10 NOTE — PROGRESS NOTES
Follow Up Note     Date: 12/10/2020   Patient Name: Phani Paulson  MRN: [id number]  : 1970     Referring Physician: No ref. provider found    Chief Complaint:    Chief Complaint   Patient presents with   • Follow-up   • EOE       Interval History:   12/10/2020  Phani Paulson is a 50 y.o. male who is here today for follow up after he had an EGD.  He states that his dysphagia clearly resolved now.   Denies any reflux symptoms.  He is here to discuss the recent endoscopy findings and further plan    2020  Phani Paulson is a 50 y.o. male who is here today for follow up for his dysphagia.  He states that he does not have any problem swallowing.  He was on high-dose PPI for 3 months now.  He is here to discuss the further follow-up and management     2020  Phani Paulson is a 50 y.o. male who is here today for follow up for follow-up after procedure recent.  He had an EGD done on 6/15/2020.  He had esophageal dilatation since then he is doing well.  No dysphagia episodes.  No new complaints today.  He is here to discuss the pathology report and lab work report done recently     2020  Phani Paulson is a 50 y.o. male who is here today to establish care with Gastroenterology for evaluation of dysphagia.  The patient has difficulty swallowing off and on for the last six months. The symptom is moderate in severity, occurs occasionally once in few weeks mostly associated with solid foods.  The symptoms are progressive now.  Recently he had choking with spagetti and he had to literally cough it out after some time. Whe these happens he will drink water and walk around and gets better he says. The patient points towards the upper substernal area. He deny any odynophagia. He has history of acid reflux on and off few years. Takes occasional Tums otherwise not on any medication. He may have reflux episodes once a week or so.  Deny any nausea or vomiting.  There is no associated  weight loss. No h/o food allergy.   No associated abdominal pain or distension. Deny  any change in bowel habit, hematochezia or melena.   There is no history of anemia. No prior history of EGD. He had a colonoscopy 2 years ago and few polyps removed. Family history of colon cancer- Mother had colon CA at 65yrs of age. No other any GI malignancy.   Non smoker  and no ETOH abuse.        Subjective      Past Medical History:   Past Medical History:   Diagnosis Date   • Acute suppurative otitis media of both ears without spontaneous rupture of tympanic membranes    • Chicken pox    • Esophageal stricture    • GERD (gastroesophageal reflux disease)    • Lesion of skin of face     benign   • Seasonal allergies    • Wears glasses    • Wears glasses      Past Surgical History:   Past Surgical History:   Procedure Laterality Date   • COLONOSCOPY     • ENDOSCOPY N/A 6/15/2020    Procedure: ESOPHAGOGASTRODUODENOSCOPY WITH BIOPSY AND DILATATION;  Surgeon: Manuel Ann MD;  Location: [facility] ENDOSCOPY;  Service: Gastroenterology;  Laterality: N/A;   • ENDOSCOPY N/A 8/21/2020    Procedure: ESOPHAGOGASTRODUODENOSCOPY WITH BIOPSY;  Surgeon: Manuel Ann MD;  Location: [facility] ENDOSCOPY;  Service: Gastroenterology;  Laterality: N/A;   • ENDOSCOPY N/A 10/30/2020    Procedure: ESOPHAGOGASTRODUODENOSCOPY WITH COLD FORCEP BIOPSY;  Surgeon: Manuel Ann MD;  Location: [facility] ENDOSCOPY;  Service: Gastroenterology;  Laterality: N/A;   • SKIN BIOPSY      face-benign       Family History:   Family History   Problem Relation Age of Onset   • Colon cancer Mother    • Hypertension Mother    • Skin cancer Father    • Cirrhosis Neg Hx    • Liver cancer Neg Hx    • Liver disease Neg Hx        Social History:   Social History     Socioeconomic History   • Marital status:      Spouse name: Not on file   • Number of children: Not on file   • Years of education: Not on file   • Highest education level: Not on file  "  Tobacco Use   • Smoking status: Never Smoker   • Smokeless tobacco: Never Used   Substance and Sexual Activity   • Alcohol use: No   • Drug use: Never   • Sexual activity: Defer       Medications:     Current Outpatient Medications:   •  Ibuprofen (ADVIL PO), Take  by mouth As Needed., Disp: , Rfl:   •  Loratadine 10 MG capsule, Take 1 capsule by mouth Daily., Disp: , Rfl:   •  pantoprazole (PROTONIX) 40 MG EC tablet, Take 40 mg by mouth Daily., Disp: , Rfl:     Allergies:   No Known Allergies    Review of Systems:   Review of Systems   Constitutional: Negative for appetite change, fatigue and unexpected weight loss.   Gastrointestinal: Negative for abdominal distention, abdominal pain, anal bleeding, blood in stool, constipation, diarrhea, nausea, rectal pain, vomiting, GERD and indigestion.       The following portions of the patient's history were reviewed and updated as appropriate: allergies, current medications, past family history, past medical history, past social history, past surgical history and problem list.    Objective     Physical Exam:  Vital Signs:   Vitals:    12/10/20 1525   BP: 136/78   Pulse: 75   Resp: 12   Temp: 98.4 °F (36.9 °C)   Weight: 60.8 kg (134 lb)   Height: 167.6 cm (66\")       Physical Exam  Vitals signs and nursing note reviewed.   Constitutional:       Appearance: He is well-developed.   HENT:      Head: Normocephalic and atraumatic.      Right Ear: External ear normal.      Left Ear: External ear normal.   Eyes:      Conjunctiva/sclera: Conjunctivae normal.   Neck:      Musculoskeletal: Normal range of motion and neck supple.      Thyroid: No thyromegaly.      Trachea: No tracheal deviation.   Cardiovascular:      Rate and Rhythm: Normal rate and regular rhythm.      Heart sounds: No murmur.   Pulmonary:      Effort: Pulmonary effort is normal. No respiratory distress.      Breath sounds: Normal breath sounds.   Abdominal:      General: Bowel sounds are normal. There is no " distension.      Palpations: Abdomen is soft. There is no mass.      Tenderness: There is no abdominal tenderness.      Hernia: No hernia is present.   Musculoskeletal: Normal range of motion.   Skin:     General: Skin is warm and dry.   Neurological:      General: No focal deficit present.      Mental Status: He is alert and oriented to person, place, and time.      Cranial Nerves: No cranial nerve deficit.      Sensory: No sensory deficit.   Psychiatric:         Mood and Affect: Mood normal.         Behavior: Behavior normal.         Thought Content: Thought content normal.         Judgment: Judgment normal.         Results Review:   I reviewed the patient's new clinical results.    Admission on 10/30/2020, Discharged on 10/30/2020   Component Date Value Ref Range Status   • Case Report 10/30/2020    Final                    Value:Surgical Pathology Report                         Case: [id number]                                  Authorizing Provider:  Manuel Ann MD  Collected:           10/30/2020 08:15 AM          Ordering Location:     [facility]    Received:            10/30/2020 09:45 AM                                 [facility]                                                                    Pathologist:           Hector Dave MD                                                       Specimen:    Esophagus, proximal, mid and distal esophageal biopsies for EOE                           • Final Diagnosis 10/30/2020    Final                    Value:This result contains rich text formatting which cannot be displayed here.   Lab on 10/27/2020   Component Date Value Ref Range Status   • SARS-CoV-2 EMILEE 10/27/2020 Not Detected  Not Detected Final      No radiology results for the last 90 days.    Assessment / Plan      1.  Eosinophilic esophagitis  2.  Esophageal stricture  3.  Schatzki's ring GE junction  12/10/2020  His EGD done on 10/30/2020 again revealed signs of EOE with a  ERESS  score of 2.   His esophageal biopsies at this time showed a chronic inflammation and reactive changes with the eosinophils 10 per high-power field.  His pathology findings significant improved from prior finding of 38 eosinophils per high-power field.   He had a previous mild upper esophageal stricture from EOE status post esophageal dilatation with a savory dilator in June 2020  No further issues with the swallowing  Will consider repeat EGD next 1 to 2 years time.  They may also reduce his PPI dose to 20 mg p.o. daily after 1 year    9/24/2000  He denies any dysphagia.  No new symptoms.  Already finished 3 months of high-dose PPI  We will continue with the Protonix 40 g p.o. daily  We will schedule him for an EGD and repeat biopsy to assess for response to treatment and to assess for PPI responsive EOE and will decide on further management.[facility] [place]  The indications, technique, alternatives and potential risk and complications were discussed with the patient including but not limited to bleeding, bowel perforations, missing lesions and anesthetic complications. The patient understands and wishes to proceed with the procedure and has given their verbal consent. Written patient education information was given to the patient.   The patient will call if they have further questions before procedure.      6/24/2020  He had an EGD done on 6/15/2020.  EGD revealed a mild upper esophageal stricture.  He had a esophageal savory dilatation performed and had multiple biopsies obtained to rule out EOE.  His biopsies revealed a 38 eosinophils per high-power field suggesting eosinophilic esophagitis.  He did not have any classical signs of EOE and endoscopy however he did have a mild upper esophageal narrowing.  EGD also showed small hiatal hernia.  Gastric biopsies were negative for H. pylori.  Lab reports and pathology reports have been discussed with the patient.   His food allergy profile came out positive for allergy to  cows milk, corn, sesame seed  I have advised to avoid about.  We will treat him with high-dose PPI for 8 weeks to rule out PPI responsive EOE  We will repeat the EGD after 8 weeks treatment with high-dose PPI  Will recommend further based on the repeat biopsy report       Prior history  3. Gastroesophageal reflux disease without esophagitis  Asymptomatic now with the PPI     4. Personal history of colonic polyps  Last colonoscopy was about 2 years ago at [place].  Few polyps removed and recommended repeat colonoscopy in 5 years  He also has a family history of colon cancer and needs a colonoscopy every 5 years time  Repeat colonoscopy in 2023     5. Family hx of colon cancer  Mom had a colon cancer at age of 65      Follow Up:   No follow-ups on file.    Manuel Ann MD  Gastroenterology [place]  12/10/2020  15:27 EST     Please note that portions of this note may have been completed with a voice recognition program.

## 2021-06-14 ENCOUNTER — OFFICE VISIT (OUTPATIENT)
Dept: INTERNAL MEDICINE | Facility: CLINIC | Age: 51
End: 2021-06-14

## 2021-06-14 VITALS
BODY MASS INDEX: 22.34 KG/M2 | HEIGHT: 66 IN | HEART RATE: 65 BPM | RESPIRATION RATE: 14 BRPM | WEIGHT: 139 LBS | OXYGEN SATURATION: 98 % | DIASTOLIC BLOOD PRESSURE: 89 MMHG | TEMPERATURE: 97.5 F | SYSTOLIC BLOOD PRESSURE: 128 MMHG

## 2021-06-14 DIAGNOSIS — Z00.00 ROUTINE GENERAL MEDICAL EXAMINATION AT A HEALTH CARE FACILITY: Primary | ICD-10-CM

## 2021-06-14 PROCEDURE — 99396 PREV VISIT EST AGE 40-64: CPT | Performed by: INTERNAL MEDICINE

## 2021-06-14 NOTE — PROGRESS NOTES
Subjective   Phani Paulson is a 51 y.o. male.     Chief Complaint   Patient presents with   • Annual Exam       History of Present Illness   Patient is here for a physical exam and due for labs for his cholesterol, his blood pressure is noted to be elevated and he is advised to monitor the same at home, his next colonoscopy is due 2023 when he follows up with GI    Review of Systems   Constitutional: Negative for appetite change, fatigue and fever.   HENT: Negative for congestion, ear discharge, ear pain, sinus pressure and sore throat.    Eyes: Negative for pain and discharge.   Respiratory: Negative for cough, shortness of breath and wheezing.    Cardiovascular: Negative for chest pain, palpitations and leg swelling.   Gastrointestinal: Negative for abdominal pain, constipation, diarrhea, nausea and vomiting.   Endocrine: Negative for cold intolerance and heat intolerance.   Genitourinary: Negative for dysuria and flank pain.   Musculoskeletal: Negative for arthralgias and joint swelling.   Skin: Negative for pallor and rash.   Allergic/Immunologic: Negative for environmental allergies and food allergies.   Neurological: Negative for dizziness, weakness and numbness.   Hematological: Negative for adenopathy. Does not bruise/bleed easily.   Psychiatric/Behavioral: Negative for behavioral problems and dysphoric mood. The patient is not nervous/anxious.        Past Medical History:   Diagnosis Date   • Acute suppurative otitis media of both ears without spontaneous rupture of tympanic membranes    • Chicken pox    • Esophageal stricture    • GERD (gastroesophageal reflux disease)    • Lesion of skin of face     benign   • Seasonal allergies    • Wears glasses    • Wears glasses        Past Surgical History:   Procedure Laterality Date   • COLONOSCOPY      4yrs    • ENDOSCOPY N/A 6/15/2020    Procedure: ESOPHAGOGASTRODUODENOSCOPY WITH BIOPSY AND DILATATION;  Surgeon: Manuel Ann MD;  Location: [facility]  "ENDOSCOPY;  Service: Gastroenterology;  Laterality: N/A;   • ENDOSCOPY N/A 8/21/2020    Procedure: ESOPHAGOGASTRODUODENOSCOPY WITH BIOPSY;  Surgeon: Manuel Ann MD;  Location: [facility] ENDOSCOPY;  Service: Gastroenterology;  Laterality: N/A;   • ENDOSCOPY N/A 10/30/2020    Procedure: ESOPHAGOGASTRODUODENOSCOPY WITH COLD FORCEP BIOPSY;  Surgeon: Manuel Ann MD;  Location: [facility] ENDOSCOPY;  Service: Gastroenterology;  Laterality: N/A;   • SKIN BIOPSY      face-benign       Family History   Problem Relation Age of Onset   • Colon cancer Mother    • Hypertension Mother    • Skin cancer Father    • Cirrhosis Neg Hx    • Liver cancer Neg Hx    • Liver disease Neg Hx         reports that he has never smoked. He has never used smokeless tobacco. He reports that he does not drink alcohol and does not use drugs.    No Known Allergies        Current Outpatient Medications:   •  Loratadine 10 MG capsule, Take 1 capsule by mouth Daily., Disp: , Rfl:   •  pantoprazole (PROTONIX) 40 MG EC tablet, Take 40 mg by mouth Daily., Disp: , Rfl:       Objective   Blood pressure 128/89, pulse 65, temperature 97.5 °F (36.4 °C), resp. rate 14, height 167.6 cm (65.98\"), weight 63 kg (139 lb), SpO2 98 %.    Physical Exam  Vitals and nursing note reviewed.   Constitutional:       General: He is not in acute distress.     Appearance: Normal appearance. He is not diaphoretic.   HENT:      Head: Normocephalic and atraumatic.      Right Ear: External ear normal.      Left Ear: External ear normal.      Nose: Nose normal.   Eyes:      Extraocular Movements: Extraocular movements intact.      Conjunctiva/sclera: Conjunctivae normal.   Neck:      Trachea: Trachea normal.   Cardiovascular:      Rate and Rhythm: Normal rate and regular rhythm.      Heart sounds: Normal heart sounds.   Pulmonary:      Effort: Pulmonary effort is normal. No respiratory distress.   Abdominal:      General: Abdomen is flat.   Musculoskeletal:      " Cervical back: Neck supple.      Comments: Moves all limbs   Skin:     General: Skin is warm and dry.      Findings: No erythema.   Neurological:      Mental Status: He is alert and oriented to person, place, and time.      Comments: No gross motor or sensory deficits         Patient's Body mass index is 22.45 kg/m². indicating that he is within normal range (BMI 18.5-24.9). No BMI management plan needed..      Results for orders placed or performed during the hospital encounter of 10/30/20   Tissue Pathology Exam    Specimen: Esophagus; Tissue   Result Value Ref Range    Case Report       Surgical Pathology Report                         Case: [id number]                                  Authorizing Provider:  Manuel Ann MD  Collected:           10/30/2020 08:15 AM          Ordering Location:     [facility]    Received:            10/30/2020 09:45 AM                                 [facility]                                                                    Pathologist:           Hector Dave MD                                                       Specimen:    Esophagus, proximal, mid and distal esophageal biopsies for EOE                            Final Diagnosis       See Scanned Report             Assessment/Plan   Diagnoses and all orders for this visit:    1. Routine general medical examination at a health care facility (Primary)  -     CBC & Differential  -     Comprehensive Metabolic Panel  -     Lipid Panel  -     TSH  -     Vitamin B12  -     Vitamin D 25 Hydroxy      plan:  1.physical: Physical exam conducted today,  Will obtain fasting lab work, patient advised to monitor his vitals at home and call the clinic if they stay elevated and follow low-sodium diet  Impression: healthy adult Patient. Currently, patient eats a healthy diet. Screening lab work includes glucose, lipid profile , complete blood count and comprehensive panel . The risks and benefits of immunizations  were discussed and immunizations are up to date. Advice and education were given regarding nutrition and aerobic exercise. Patient discussion: discussed with the patient.  Physical with preventive exam as well as age and risk appropriate counseling completed.   Patient Discussion: plan discussed with the patient, follow-up visit needed in one year. Medication Review and medication list updated  Advised  regular weight-bearing exercise             Annabelle Jarvis MD

## 2021-06-14 NOTE — PATIENT INSTRUCTIONS
MyPlate from USDA    MyPlate is an outline of a general healthy diet based on the 2010 Dietary Guidelines for Americans, from the U.S. Department of Agriculture (USDA). It sets guidelines for how much food you should eat from each food group based on your age, sex, and level of physical activity.  What are tips for following MyPlate?  To follow MyPlate recommendations:  · Eat a wide variety of fruits and vegetables, grains, and protein foods.  · Serve smaller portions and eat less food throughout the day.  · Limit portion sizes to avoid overeating.  · Enjoy your food.  · Get at least 150 minutes of exercise every week. This is about 30 minutes each day, 5 or more days per week.  It can be difficult to have every meal look like MyPlate. Think about MyPlate as eating guidelines for an entire day, rather than each individual meal.  Fruits and vegetables  · Make half of your plate fruits and vegetables.  · Eat many different colors of fruits and vegetables each day.  · For a 2,000 calorie daily food plan, eat:  ? 2½ cups of vegetables every day.  ? 2 cups of fruit every day.  · 1 cup is equal to:  ? 1 cup raw or cooked vegetables.  ? 1 cup raw fruit.  ? 1 medium-sized orange, apple, or banana.  ? 1 cup 100% fruit or vegetable juice.  ? 2 cups raw leafy greens, such as lettuce, spinach, or kale.  ? ½ cup dried fruit.  Grains  · One fourth of your plate should be grains.  · Make at least half of the grains you eat each day whole grains.  · For a 2,000 calorie daily food plan, eat 6 oz of grains every day.  · 1 oz is equal to:  ? 1 slice bread.  ? 1 cup cereal.  ? ½ cup cooked rice, cereal, or pasta.  Protein  · One fourth of your plate should be protein.  · Eat a wide variety of protein foods, including meat, poultry, fish, eggs, beans, nuts, and tofu.  · For a 2,000 calorie daily food plan, eat 5½ oz of protein every day.  · 1 oz is equal to:  ? 1 oz meat, poultry, or fish.  ? ¼ cup cooked beans.  ? 1 egg.  ? ½ oz nuts  or seeds.  ? 1 Tbsp peanut butter.  Dairy  · Drink fat-free or low-fat (1%) milk.  · Eat or drink dairy as a side to meals.  · For a 2,000 calorie daily food plan, eat or drink 3 cups of dairy every day.  · 1 cup is equal to:  ? 1 cup milk, yogurt, cottage cheese, or soy milk (soy beverage).  ? 2 oz processed cheese.  ? 1½ oz natural cheese.  Fats, oils, salt, and sugars  · Only small amounts of oils are recommended.  · Avoid foods that are high in calories and low in nutritional value (empty calories), like foods high in fat or added sugars.  · Choose foods that are low in salt (sodium). Choose foods that have less than 140 milligrams (mg) of sodium per serving.  · Drink water instead of sugary drinks. Drink enough water each day to keep your urine pale yellow.  Where to find support  · Work with your health care provider or a nutrition specialist (dietitian) to develop a customized eating plan that is right for you.  · Download an kelvin (mobile application) to help you track your daily food intake.  Where to find more information  · Go to ChooseMyPlate.gov for more information.  Summary  · MyPlate is a general guideline for healthy eating from the USDA. It is based on the 2010 Dietary Guidelines for Americans.  · In general, fruits and vegetables should take up ½ of your plate, grains should take up ¼ of your plate, and protein should take up ¼ of your plate.  This information is not intended to replace advice given to you by your health care provider. Make sure you discuss any questions you have with your health care provider.  Document Revised: 05/21/2020 Document Reviewed: 03/19/2018  Elsevier Patient Education © 2021 Elsevier Inc.

## 2021-06-15 LAB
25(OH)D3+25(OH)D2 SERPL-MCNC: 20.5 NG/ML (ref 30–100)
ALBUMIN SERPL-MCNC: 4.8 G/DL (ref 4–5)
ALBUMIN/GLOB SERPL: 1.8 {RATIO} (ref 1.2–2.2)
ALP SERPL-CCNC: 81 IU/L (ref 48–121)
ALT SERPL-CCNC: 16 IU/L (ref 0–44)
AST SERPL-CCNC: 21 IU/L (ref 0–40)
BASOPHILS # BLD AUTO: 0.1 X10E3/UL (ref 0–0.2)
BASOPHILS NFR BLD AUTO: 2 %
BILIRUB SERPL-MCNC: 0.7 MG/DL (ref 0–1.2)
BUN SERPL-MCNC: 15 MG/DL (ref 6–24)
BUN/CREAT SERPL: 14 (ref 9–20)
CALCIUM SERPL-MCNC: 9.5 MG/DL (ref 8.7–10.2)
CHLORIDE SERPL-SCNC: 104 MMOL/L (ref 96–106)
CHOLEST SERPL-MCNC: 195 MG/DL (ref 100–199)
CO2 SERPL-SCNC: 22 MMOL/L (ref 20–29)
CREAT SERPL-MCNC: 1.1 MG/DL (ref 0.76–1.27)
EOSINOPHIL # BLD AUTO: 0.1 X10E3/UL (ref 0–0.4)
EOSINOPHIL NFR BLD AUTO: 2 %
ERYTHROCYTE [DISTWIDTH] IN BLOOD BY AUTOMATED COUNT: 12.9 % (ref 11.6–15.4)
GLOBULIN SER CALC-MCNC: 2.6 G/DL (ref 1.5–4.5)
GLUCOSE SERPL-MCNC: 96 MG/DL (ref 65–99)
HCT VFR BLD AUTO: 50.9 % (ref 37.5–51)
HDLC SERPL-MCNC: 58 MG/DL
HGB BLD-MCNC: 17.2 G/DL (ref 13–17.7)
IMM GRANULOCYTES # BLD AUTO: 0 X10E3/UL (ref 0–0.1)
IMM GRANULOCYTES NFR BLD AUTO: 0 %
LDLC SERPL CALC-MCNC: 127 MG/DL (ref 0–99)
LYMPHOCYTES # BLD AUTO: 0.9 X10E3/UL (ref 0.7–3.1)
LYMPHOCYTES NFR BLD AUTO: 14 %
MCH RBC QN AUTO: 29.9 PG (ref 26.6–33)
MCHC RBC AUTO-ENTMCNC: 33.8 G/DL (ref 31.5–35.7)
MCV RBC AUTO: 88 FL (ref 79–97)
MONOCYTES # BLD AUTO: 0.7 X10E3/UL (ref 0.1–0.9)
MONOCYTES NFR BLD AUTO: 10 %
NEUTROPHILS # BLD AUTO: 4.8 X10E3/UL (ref 1.4–7)
NEUTROPHILS NFR BLD AUTO: 72 %
PLATELET # BLD AUTO: 307 X10E3/UL (ref 150–450)
POTASSIUM SERPL-SCNC: 4.5 MMOL/L (ref 3.5–5.2)
PROT SERPL-MCNC: 7.4 G/DL (ref 6–8.5)
RBC # BLD AUTO: 5.76 X10E6/UL (ref 4.14–5.8)
SODIUM SERPL-SCNC: 141 MMOL/L (ref 134–144)
TRIGL SERPL-MCNC: 55 MG/DL (ref 0–149)
TSH SERPL DL<=0.005 MIU/L-ACNC: 1.5 UIU/ML (ref 0.45–4.5)
VIT B12 SERPL-MCNC: 344 PG/ML (ref 232–1245)
VLDLC SERPL CALC-MCNC: 10 MG/DL (ref 5–40)
WBC # BLD AUTO: 6.6 X10E3/UL (ref 3.4–10.8)

## 2021-06-15 RX ORDER — ERGOCALCIFEROL 1.25 MG/1
50000 CAPSULE ORAL WEEKLY
Qty: 8 CAPSULE | Refills: 0 | Status: SHIPPED | OUTPATIENT
Start: 2021-06-15 | End: 2022-06-16

## 2021-08-03 RX ORDER — ERGOCALCIFEROL 1.25 MG/1
CAPSULE ORAL
Qty: 8 CAPSULE | Refills: 0 | OUTPATIENT
Start: 2021-08-03

## 2021-08-03 NOTE — TELEPHONE ENCOUNTER
Rx Refill Note  Requested Prescriptions     Pending Prescriptions Disp Refills   • vitamin D (ERGOCALCIFEROL) 1.25 MG (57167 UT) capsule capsule [Pharmacy Med Name: Vitamin D (Ergocalciferol) Oral Capsule 1.25 MG (37690 UT)] 8 capsule 0     Sig: TAKE 1 CAPSULE BY MOUTH ONE TIME A WEEK      Last office visit with prescribing clinician: 6/14/2021      Next office visit with prescribing clinician: 6/16/2022            Satya Marcos CMA  08/03/21, 15:30 EDT

## 2021-10-21 ENCOUNTER — TELEPHONE (OUTPATIENT)
Dept: GASTROENTEROLOGY | Facility: CLINIC | Age: 51
End: 2021-10-21

## 2021-10-21 DIAGNOSIS — K21.9 GASTROESOPHAGEAL REFLUX DISEASE, UNSPECIFIED WHETHER ESOPHAGITIS PRESENT: Primary | ICD-10-CM

## 2021-10-21 RX ORDER — PANTOPRAZOLE SODIUM 40 MG/1
40 TABLET, DELAYED RELEASE ORAL DAILY
Qty: 90 TABLET | Refills: 0 | Status: SHIPPED | OUTPATIENT
Start: 2021-10-21 | End: 2021-12-16 | Stop reason: SDUPTHER

## 2021-10-21 NOTE — TELEPHONE ENCOUNTER
----- Message from Mabel Hamilton MA sent at 10/21/2021 11:41 AM EDT -----  Says he needs refill on his protonix #90, has appt in Dec, uses [facility]

## 2021-12-16 ENCOUNTER — OFFICE VISIT (OUTPATIENT)
Dept: GASTROENTEROLOGY | Facility: CLINIC | Age: 51
End: 2021-12-16

## 2021-12-16 VITALS
WEIGHT: 141 LBS | DIASTOLIC BLOOD PRESSURE: 80 MMHG | HEIGHT: 66 IN | TEMPERATURE: 97.7 F | SYSTOLIC BLOOD PRESSURE: 122 MMHG | BODY MASS INDEX: 22.66 KG/M2

## 2021-12-16 DIAGNOSIS — K21.9 GASTROESOPHAGEAL REFLUX DISEASE, UNSPECIFIED WHETHER ESOPHAGITIS PRESENT: ICD-10-CM

## 2021-12-16 DIAGNOSIS — Z86.010 PERSONAL HISTORY OF COLONIC POLYPS: ICD-10-CM

## 2021-12-16 DIAGNOSIS — K20.0 ESOPHAGITIS, EOSINOPHILIC: Primary | ICD-10-CM

## 2021-12-16 DIAGNOSIS — Z87.19 HISTORY OF ESOPHAGEAL STRICTURE: ICD-10-CM

## 2021-12-16 PROCEDURE — 99213 OFFICE O/P EST LOW 20 MIN: CPT | Performed by: INTERNAL MEDICINE

## 2021-12-16 RX ORDER — PANTOPRAZOLE SODIUM 40 MG/1
40 TABLET, DELAYED RELEASE ORAL DAILY
Qty: 90 TABLET | Refills: 3 | Status: SHIPPED | OUTPATIENT
Start: 2021-12-16 | End: 2022-01-19

## 2021-12-16 NOTE — PROGRESS NOTES
Follow Up Note     Date: 2021   Patient Name: Phani Paulson  MRN: [id number]  : 1970     Referring Physician: Annabelle Jarvis MD    Chief Complaint:    Chief Complaint   Patient presents with   • Follow-up   • Esophagitis, eosinophilic       Interval History:   2021  Phani Paulson is a 51 y.o. male who is here today for follow up for his eosinophilic esophagitis.  He states that he is doing very well with the medication denies any swallowing issues or reflux issues.    12/10/2020  Phani Paulson is a 50 y.o. male who is here today for follow up after he had an EGD.  He states that his dysphagia clearly resolved now.   Denies any reflux symptoms.  He is here to discuss the recent endoscopy findings and further plan     2020  Phani Paulson is a 50 y.o. male who is here today for follow up for his dysphagia.  He states that he does not have any problem swallowing.  He was on high-dose PPI for 3 months now.  He is here to discuss the further follow-up and management     2020  Phani Paulson is a 50 y.o. male who is here today for follow up for follow-up after procedure recent.  He had an EGD done on 6/15/2020.  He had esophageal dilatation since then he is doing well.  No dysphagia episodes.  No new complaints today.  He is here to discuss the pathology report and lab work report done recently     2020  Phani Paulson is a 50 y.o. male who is here today to establish care with Gastroenterology for evaluation of dysphagia.  The patient has difficulty swallowing off and on for the last six months. The symptom is moderate in severity, occurs occasionally once in few weeks mostly associated with solid foods.  The symptoms are progressive now.  Recently he had choking with spagetti and he had to literally cough it out after some time. Whe these happens he will drink water and walk around and gets better he says. The patient points towards  the upper substernal area. He deny any odynophagia. He has history of acid reflux on and off few years. Takes occasional Tums otherwise not on any medication. He may have reflux episodes once a week or so.  Deny any nausea or vomiting.  There is no associated weight loss. No h/o food allergy.   No associated abdominal pain or distension. Deny  any change in bowel habit, hematochezia or melena.   There is no history of anemia. No prior history of EGD. He had a colonoscopy 2 years ago and few polyps removed. Family history of colon cancer- Mother had colon CA at 65yrs of age. No other any GI malignancy.   Non smoker  and no ETOH abuse.    Subjective      Past Medical History:   Past Medical History:   Diagnosis Date   • Acute suppurative otitis media of both ears without spontaneous rupture of tympanic membranes    • Chicken pox    • Esophageal stricture    • GERD (gastroesophageal reflux disease)    • Lesion of skin of face     benign   • Seasonal allergies    • Wears glasses    • Wears glasses      Past Surgical History:   Past Surgical History:   Procedure Laterality Date   • COLONOSCOPY      4yrs    • ENDOSCOPY N/A 6/15/2020    Procedure: ESOPHAGOGASTRODUODENOSCOPY WITH BIOPSY AND DILATATION;  Surgeon: Manuel Ann MD;  Location: [facility] ENDOSCOPY;  Service: Gastroenterology;  Laterality: N/A;   • ENDOSCOPY N/A 8/21/2020    Procedure: ESOPHAGOGASTRODUODENOSCOPY WITH BIOPSY;  Surgeon: Manuel Ann MD;  Location: [facility] ENDOSCOPY;  Service: Gastroenterology;  Laterality: N/A;   • ENDOSCOPY N/A 10/30/2020    Procedure: ESOPHAGOGASTRODUODENOSCOPY WITH COLD FORCEP BIOPSY;  Surgeon: Manuel Ann MD;  Location: [facility] ENDOSCOPY;  Service: Gastroenterology;  Laterality: N/A;   • SKIN BIOPSY      face-benign       Family History:   Family History   Problem Relation Age of Onset   • Colon cancer Mother    • Hypertension Mother    • Skin cancer Father    • Cirrhosis Neg Hx    • Liver cancer Neg  "Hx    • Liver disease Neg Hx        Social History:   Social History     Socioeconomic History   • Marital status:    Tobacco Use   • Smoking status: Never Smoker   • Smokeless tobacco: Never Used   Vaping Use   • Vaping Use: Never used   Substance and Sexual Activity   • Alcohol use: No   • Drug use: Never   • Sexual activity: Defer       Medications:     Current Outpatient Medications:   •  Loratadine 10 MG capsule, Take 1 capsule by mouth Daily., Disp: , Rfl:   •  pantoprazole (PROTONIX) 40 MG EC tablet, Take 1 tablet by mouth Daily., Disp: 90 tablet, Rfl: 0  •  vitamin D (ERGOCALCIFEROL) 1.25 MG (31804 UT) capsule capsule, Take 1 capsule by mouth 1 (One) Time Per Week., Disp: 8 capsule, Rfl: 0    Allergies:   No Known Allergies    Review of Systems:   Review of Systems   Constitutional: Negative for appetite change, fatigue, fever and unexpected weight loss.   HENT: Negative for trouble swallowing.    Gastrointestinal: Negative for abdominal distention, abdominal pain, anal bleeding, blood in stool, constipation, diarrhea, nausea, rectal pain, vomiting, GERD and indigestion.       The following portions of the patient's history were reviewed and updated as appropriate: allergies, current medications, past family history, past medical history, past social history, past surgical history and problem list.    Objective     Physical Exam:  Vital Signs:   Vitals:    12/16/21 1551   BP: 122/80   Temp: 97.7 °F (36.5 °C)   TempSrc: Infrared   Weight: 64 kg (141 lb)   Height: 167.6 cm (66\")       Physical Exam  Constitutional:       Appearance: Normal appearance.   HENT:      Head: Normocephalic and atraumatic.   Eyes:      Conjunctiva/sclera: Conjunctivae normal.   Abdominal:      General: Abdomen is flat. There is no distension.      Palpations: There is no mass.      Tenderness: There is no abdominal tenderness. There is no guarding or rebound.      Hernia: No hernia is present.   Musculoskeletal:      Cervical " back: Normal range of motion and neck supple.   Neurological:      Mental Status: He is alert.         Results Review:   I reviewed the patient's new clinical results.    No visits with results within 90 Day(s) from this visit.   Latest known visit with results is:   Office Visit on 06/14/2021   Component Date Value Ref Range Status   • WBC 06/14/2021 6.6  3.4 - 10.8 x10E3/uL Final   • RBC 06/14/2021 5.76  4.14 - 5.80 x10E6/uL Final   • Hemoglobin 06/14/2021 17.2  13.0 - 17.7 g/dL Final   • Hematocrit 06/14/2021 50.9  37.5 - 51.0 % Final   • MCV 06/14/2021 88  79 - 97 fL Final   • MCH 06/14/2021 29.9  26.6 - 33.0 pg Final   • MCHC 06/14/2021 33.8  31.5 - 35.7 g/dL Final   • RDW 06/14/2021 12.9  11.6 - 15.4 % Final   • Platelets 06/14/2021 307  150 - 450 x10E3/uL Final   • Neutrophil Rel % 06/14/2021 72  Not Estab. % Final   • Lymphocyte Rel % 06/14/2021 14  Not Estab. % Final   • Monocyte Rel % 06/14/2021 10  Not Estab. % Final   • Eosinophil Rel % 06/14/2021 2  Not Estab. % Final   • Basophil Rel % 06/14/2021 2  Not Estab. % Final   • Neutrophils Absolute 06/14/2021 4.8  1.4 - 7.0 x10E3/uL Final   • Lymphocytes Absolute 06/14/2021 0.9  0.7 - 3.1 x10E3/uL Final   • Monocytes Absolute 06/14/2021 0.7  0.1 - 0.9 x10E3/uL Final   • Eosinophils Absolute 06/14/2021 0.1  0.0 - 0.4 x10E3/uL Final   • Basophils Absolute 06/14/2021 0.1  0.0 - 0.2 x10E3/uL Final   • Immature Granulocyte Rel % 06/14/2021 0  Not Estab. % Final   • Immature Grans Absolute 06/14/2021 0.0  0.0 - 0.1 x10E3/uL Final   • Glucose 06/14/2021 96  65 - 99 mg/dL Final   • BUN 06/14/2021 15  6 - 24 mg/dL Final   • Creatinine 06/14/2021 1.10  0.76 - 1.27 mg/dL Final   • eGFR Non  Am 06/14/2021 78  >59 mL/min/1.73 Final   • eGFR African Am 06/14/2021 91  >59 mL/min/1.73 Final    Comment: **[facility] currently reports eGFR in compliance with the current**    recommendations of the National Kidney Foundation. [facility] will    update reporting as new  guidelines are published from the NKF-ASN    Task force.     • BUN/Creatinine Ratio 06/14/2021 14  9 - 20 Final   • Sodium 06/14/2021 141  134 - 144 mmol/L Final   • Potassium 06/14/2021 4.5  3.5 - 5.2 mmol/L Final   • Chloride 06/14/2021 104  96 - 106 mmol/L Final   • Total CO2 06/14/2021 22  20 - 29 mmol/L Final   • Calcium 06/14/2021 9.5  8.7 - 10.2 mg/dL Final   • Total Protein 06/14/2021 7.4  6.0 - 8.5 g/dL Final   • Albumin 06/14/2021 4.8  4.0 - 5.0 g/dL Final   • Globulin 06/14/2021 2.6  1.5 - 4.5 g/dL Final   • A/G Ratio 06/14/2021 1.8  1.2 - 2.2 Final   • Total Bilirubin 06/14/2021 0.7  0.0 - 1.2 mg/dL Final   • Alkaline Phosphatase 06/14/2021 81  48 - 121 IU/L Final   • AST (SGOT) 06/14/2021 21  0 - 40 IU/L Final   • ALT (SGPT) 06/14/2021 16  0 - 44 IU/L Final   • Total Cholesterol 06/14/2021 195  100 - 199 mg/dL Final   • Triglycerides 06/14/2021 55  0 - 149 mg/dL Final   • HDL Cholesterol 06/14/2021 58  >39 mg/dL Final   • VLDL Cholesterol Tony 06/14/2021 10  5 - 40 mg/dL Final   • LDL Chol Calc ([facility]) 06/14/2021 127* 0 - 99 mg/dL Final   • TSH 06/14/2021 1.500  0.450 - 4.500 uIU/mL Final   • Vitamin B-12 06/14/2021 344  232 - 1,245 pg/mL Final   • 25 Hydroxy, Vitamin D 06/14/2021 20.5* 30.0 - 100.0 ng/mL Final    Comment: Vitamin D deficiency has been defined by the [place] of  Medicine and an Endocrine Society practice guideline as a  level of serum 25-OH vitamin D less than 20 ng/mL (1,2).  The Endocrine Society went on to further define vitamin D  insufficiency as a level between 21 and 29 ng/mL (2).  1. IOM ([place] of Medicine). 2010. Dietary reference     intakes for calcium and D. [place]: The     National Academies Press.  2. Thi SHOOK, Nader GARCIA, David PITT, et al.     Evaluation, treatment, and prevention of vitamin D     deficiency: an Endocrine Society clinical practice     guideline. JCEM. 2011 Jul; 96(7):1911-30.        No radiology results for the last 90  days.    Assessment / Plan      1.  Eosinophilic esophagitis  2.  Esophageal stricture  3.  Schatzki's ring GE junction  4.  Gastroesophageal reflux disease without esophagitis  December 16, 2021  Patient is doing very well with the daily dose of PPI Protonix 40 m p.o. daily, without any significant reflux symptoms or dysphagia.  We will continue the current PPI at current dose.  Recent labs done in June 2021 reviewed which were unremarkable.  We will repeat his EGD next year in 2022 for EOE surveillance.      12/10/2020  His EGD done on 10/30/2020 again revealed signs of EOE with a ERESS  score of 2.   His esophageal biopsies at this time showed a chronic inflammation and reactive changes with the eosinophils 10 per high-power field.  His pathology findings significant improved from prior finding of 38 eosinophils per high-power field. He had a previous mild upper esophageal stricture from EOE status post esophageal dilatation with a savory dilator in June 2020 9/24/2000  He denies any dysphagia.  No new symptoms.  Already finished 3 months of high-dose PPI. We will continue with the Protonix 40 g p.o. daily. We will schedule him for an EGD and repeat biopsy to assess for response to treatment and to assess for PPI responsive EOE and will decide on further management.[facility] [place]    [place]24/2020  He had an EGD done on 6/15/2020.  EGD revealed a mild upper esophageal stricture.  He had a esophageal savory dilatation performed and had multiple biopsies obtained to rule out EOE.  His biopsies revealed a 38 eosinophils per high-power field suggesting eosinophilic esophagitis.  He did not have any classical signs of EOE and endoscopy however he did have a mild upper esophageal narrowing.  EGD also showed small hiatal hernia.  Gastric biopsies were negative for H. pylori.  Lab reports and pathology reports have been discussed with the patient.  His food allergy profile came out positive for allergy to cows milk, corn, sesame  seed. I have advised to avoid about.  We will treat him with high-dose PPI for 8 weeks to rule out PPI responsive EOE  We will repeat the EGD after 8 weeks treatment with high-dose PPI  Will recommend further based on the repeat biopsy report        5. Personal history of colonic polyps  6. Family hx of colon cancer  December 16, 2021  Patient is due for his surveillance colonoscopy next year and will schedule colonoscopy next year along with EGD  His mom had a colon cancer at age of 65    June 24, 2020  Last colonoscopy was about 2-3 years ago at [place].  Few polyps removed and recommended repeat colonoscopy in 5 years  He also has a family history of colon cancer and needs a colonoscopy every 5 years time  Repeat colonoscopy in 2022/23        Follow Up:   No follow-ups on file.    Manuel Ann MD  [facility] [place]  12/16/2021  15:54 EST     Please note that portions of this note may have been completed with a voice recognition program.

## 2022-01-18 DIAGNOSIS — K21.9 GASTROESOPHAGEAL REFLUX DISEASE, UNSPECIFIED WHETHER ESOPHAGITIS PRESENT: ICD-10-CM

## 2022-01-19 RX ORDER — PANTOPRAZOLE SODIUM 40 MG/1
TABLET, DELAYED RELEASE ORAL
Qty: 90 TABLET | Refills: 3 | Status: SHIPPED | OUTPATIENT
Start: 2022-01-19 | End: 2023-01-09

## 2022-06-16 ENCOUNTER — OFFICE VISIT (OUTPATIENT)
Dept: INTERNAL MEDICINE | Facility: CLINIC | Age: 52
End: 2022-06-16

## 2022-06-16 VITALS
BODY MASS INDEX: 21.21 KG/M2 | SYSTOLIC BLOOD PRESSURE: 151 MMHG | DIASTOLIC BLOOD PRESSURE: 95 MMHG | HEIGHT: 66 IN | RESPIRATION RATE: 16 BRPM | WEIGHT: 132 LBS | HEART RATE: 90 BPM | OXYGEN SATURATION: 97 % | TEMPERATURE: 97.1 F

## 2022-06-16 DIAGNOSIS — Z00.00 ROUTINE GENERAL MEDICAL EXAMINATION AT A HEALTH CARE FACILITY: Primary | ICD-10-CM

## 2022-06-16 PROCEDURE — 99396 PREV VISIT EST AGE 40-64: CPT | Performed by: INTERNAL MEDICINE

## 2022-06-16 NOTE — PROGRESS NOTES
Subjective   Phani Paulson is a 52 y.o. male.     Chief Complaint   Patient presents with   • Annual Exam       History of Present Illness   Patient is here for a physical and due for labs for his cholesterol     Review of Systems  None    Past Medical History:   Diagnosis Date   • Acute suppurative otitis media of both ears without spontaneous rupture of tympanic membranes    • Chicken pox    • Esophageal stricture    • GERD (gastroesophageal reflux disease)    • Lesion of skin of face     benign   • Seasonal allergies    • Wears glasses    • Wears glasses        Past Surgical History:   Procedure Laterality Date   • COLONOSCOPY      4yrs    • ENDOSCOPY N/A 6/15/2020    Procedure: ESOPHAGOGASTRODUODENOSCOPY WITH BIOPSY AND DILATATION;  Surgeon: Manuel Ann MD;  Location: [facility] ENDOSCOPY;  Service: Gastroenterology;  Laterality: N/A;   • ENDOSCOPY N/A 8/21/2020    Procedure: ESOPHAGOGASTRODUODENOSCOPY WITH BIOPSY;  Surgeon: Manuel Ann MD;  Location: [facility] ENDOSCOPY;  Service: Gastroenterology;  Laterality: N/A;   • ENDOSCOPY N/A 10/30/2020    Procedure: ESOPHAGOGASTRODUODENOSCOPY WITH COLD FORCEP BIOPSY;  Surgeon: Manuel Ann MD;  Location: [facility] ENDOSCOPY;  Service: Gastroenterology;  Laterality: N/A;   • SKIN BIOPSY      face-benign       Family History   Problem Relation Age of Onset   • Colon cancer Mother    • Hypertension Mother    • Skin cancer Father    • Cirrhosis Neg Hx    • Liver cancer Neg Hx    • Liver disease Neg Hx         reports that he has never smoked. He has never used smokeless tobacco. He reports that he does not drink alcohol and does not use drugs.    No Known Allergies        Current Outpatient Medications:   •  Loratadine 10 MG capsule, Take 1 capsule by mouth Daily., Disp: , Rfl:   •  pantoprazole (PROTONIX) 40 MG EC tablet, TAKE 1 TABLET DAILY, Disp: 90 tablet, Rfl: 3      Objective   Blood pressure 151/95, pulse 90, temperature 97.1 °F (36.2  "°C), resp. rate 16, height 167.6 cm (65.98\"), weight 59.9 kg (132 lb), SpO2 97 %.    Physical Exam  Vitals and nursing note reviewed.   Constitutional:       General: He is not in acute distress.     Appearance: Normal appearance. He is not diaphoretic.   HENT:      Head: Normocephalic and atraumatic.      Right Ear: External ear normal.      Left Ear: External ear normal.      Nose: Nose normal.   Eyes:      Extraocular Movements: Extraocular movements intact.      Conjunctiva/sclera: Conjunctivae normal.   Neck:      Trachea: Trachea normal.   Cardiovascular:      Rate and Rhythm: Normal rate and regular rhythm.      Heart sounds: Normal heart sounds.   Pulmonary:      Effort: Pulmonary effort is normal. No respiratory distress.   Abdominal:      General: Abdomen is flat.   Musculoskeletal:      Cervical back: Neck supple.      Comments: Moves all limbs   Skin:     General: Skin is warm and dry.      Findings: No erythema.   Neurological:      Mental Status: He is alert and oriented to person, place, and time.      Comments: No gross motor or sensory deficits         BMI is within normal parameters. No other follow-up for BMI required.      Results for orders placed or performed in visit on 06/14/21   Comprehensive Metabolic Panel    Specimen: Blood   Result Value Ref Range    Glucose 96 65 - 99 mg/dL    BUN 15 6 - 24 mg/dL    Creatinine 1.10 0.76 - 1.27 mg/dL    eGFR Non African Am 78 >59 mL/min/1.73    eGFR African Am 91 >59 mL/min/1.73    BUN/Creatinine Ratio 14 9 - 20    Sodium 141 134 - 144 mmol/L    Potassium 4.5 3.5 - 5.2 mmol/L    Chloride 104 96 - 106 mmol/L    Total CO2 22 20 - 29 mmol/L    Calcium 9.5 8.7 - 10.2 mg/dL    Total Protein 7.4 6.0 - 8.5 g/dL    Albumin 4.8 4.0 - 5.0 g/dL    Globulin 2.6 1.5 - 4.5 g/dL    A/G Ratio 1.8 1.2 - 2.2    Total Bilirubin 0.7 0.0 - 1.2 mg/dL    Alkaline Phosphatase 81 48 - 121 IU/L    AST (SGOT) 21 0 - 40 IU/L    ALT (SGPT) 16 0 - 44 IU/L   Lipid Panel    Specimen: " Blood   Result Value Ref Range    Total Cholesterol 195 100 - 199 mg/dL    Triglycerides 55 0 - 149 mg/dL    HDL Cholesterol 58 >39 mg/dL    VLDL Cholesterol Tony 10 5 - 40 mg/dL    LDL Chol Calc (NIH) 127 (H) 0 - 99 mg/dL   TSH    Specimen: Blood   Result Value Ref Range    TSH 1.500 0.450 - 4.500 uIU/mL   Vitamin B12    Specimen: Blood   Result Value Ref Range    Vitamin B-12 344 232 - 1,245 pg/mL   Vitamin D 25 Hydroxy    Specimen: Blood   Result Value Ref Range    25 Hydroxy, Vitamin D 20.5 (L) 30.0 - 100.0 ng/mL   CBC & Differential    Specimen: Blood   Result Value Ref Range    WBC 6.6 3.4 - 10.8 x10E3/uL    RBC 5.76 4.14 - 5.80 x10E6/uL    Hemoglobin 17.2 13.0 - 17.7 g/dL    Hematocrit 50.9 37.5 - 51.0 %    MCV 88 79 - 97 fL    MCH 29.9 26.6 - 33.0 pg    MCHC 33.8 31.5 - 35.7 g/dL    RDW 12.9 11.6 - 15.4 %    Platelets 307 150 - 450 x10E3/uL    Neutrophil Rel % 72 Not Estab. %    Lymphocyte Rel % 14 Not Estab. %    Monocyte Rel % 10 Not Estab. %    Eosinophil Rel % 2 Not Estab. %    Basophil Rel % 2 Not Estab. %    Neutrophils Absolute 4.8 1.4 - 7.0 x10E3/uL    Lymphocytes Absolute 0.9 0.7 - 3.1 x10E3/uL    Monocytes Absolute 0.7 0.1 - 0.9 x10E3/uL    Eosinophils Absolute 0.1 0.0 - 0.4 x10E3/uL    Basophils Absolute 0.1 0.0 - 0.2 x10E3/uL    Immature Granulocyte Rel % 0 Not Estab. %    Immature Grans Absolute 0.0 0.0 - 0.1 x10E3/uL         Assessment & Plan   Diagnoses and all orders for this visit:    1. Routine general medical examination at a health care facility (Primary)  -     CBC & Differential  -     Comprehensive Metabolic Panel  -     Lipid Panel  -     Hemoglobin A1c  -     TSH  -     Vitamin B12  -     Vitamin D 25 Hydroxy      plan:  1.physical: Physical exam conducted today,  Will obtain fasting lab work,   Impression: healthy adult Patient. Currently, patient eats a healthy diet. Screening lab work includes glucose, lipid profile , complete blood count and comprehensive panel . The risks and  benefits of immunizations were discussed and immunizations are up to date. Advice and education were given regarding nutrition and aerobic exercise. Patient discussion: discussed with the patient.  Physical with preventive exam as well as age and risk appropriate counseling completed.   Patient Discussion: plan discussed with the patient, follow-up visit needed in one year. Medication Review and medication list updated  Advised   regular weight-bearing exercise  , biometric screening paperwork completed             Annabelle Jarvis MD

## 2022-06-17 LAB
25(OH)D3+25(OH)D2 SERPL-MCNC: 30 NG/ML (ref 30–100)
ALBUMIN SERPL-MCNC: 4.7 G/DL (ref 3.5–5.2)
ALBUMIN/GLOB SERPL: 1.5 G/DL
ALP SERPL-CCNC: 79 U/L (ref 39–117)
ALT SERPL-CCNC: 11 U/L (ref 1–41)
AST SERPL-CCNC: 14 U/L (ref 1–40)
BASOPHILS # BLD AUTO: 0.1 10*3/MM3 (ref 0–0.2)
BASOPHILS NFR BLD AUTO: 1.9 % (ref 0–1.5)
BILIRUB SERPL-MCNC: 0.8 MG/DL (ref 0–1.2)
BUN SERPL-MCNC: 17 MG/DL (ref 6–20)
BUN/CREAT SERPL: 16.5 (ref 7–25)
CALCIUM SERPL-MCNC: 9.7 MG/DL (ref 8.6–10.5)
CHLORIDE SERPL-SCNC: 103 MMOL/L (ref 98–107)
CHOLEST SERPL-MCNC: 217 MG/DL (ref 0–200)
CO2 SERPL-SCNC: 25.4 MMOL/L (ref 22–29)
CREAT SERPL-MCNC: 1.03 MG/DL (ref 0.76–1.27)
EGFRCR SERPLBLD CKD-EPI 2021: 88.5 ML/MIN/1.73
EOSINOPHIL # BLD AUTO: 0.15 10*3/MM3 (ref 0–0.4)
EOSINOPHIL NFR BLD AUTO: 2.8 % (ref 0.3–6.2)
ERYTHROCYTE [DISTWIDTH] IN BLOOD BY AUTOMATED COUNT: 12.4 % (ref 12.3–15.4)
GLOBULIN SER CALC-MCNC: 3.1 GM/DL
GLUCOSE SERPL-MCNC: 91 MG/DL (ref 65–99)
HBA1C MFR BLD: 5.1 % (ref 4.8–5.6)
HCT VFR BLD AUTO: 49.2 % (ref 37.5–51)
HDLC SERPL-MCNC: 57 MG/DL (ref 40–60)
HGB BLD-MCNC: 17.4 G/DL (ref 13–17.7)
IMM GRANULOCYTES # BLD AUTO: 0.01 10*3/MM3 (ref 0–0.05)
IMM GRANULOCYTES NFR BLD AUTO: 0.2 % (ref 0–0.5)
LDLC SERPL CALC-MCNC: 150 MG/DL (ref 0–100)
LYMPHOCYTES # BLD AUTO: 0.93 10*3/MM3 (ref 0.7–3.1)
LYMPHOCYTES NFR BLD AUTO: 17.3 % (ref 19.6–45.3)
MCH RBC QN AUTO: 30.5 PG (ref 26.6–33)
MCHC RBC AUTO-ENTMCNC: 35.4 G/DL (ref 31.5–35.7)
MCV RBC AUTO: 86.3 FL (ref 79–97)
MONOCYTES # BLD AUTO: 0.61 10*3/MM3 (ref 0.1–0.9)
MONOCYTES NFR BLD AUTO: 11.3 % (ref 5–12)
NEUTROPHILS # BLD AUTO: 3.59 10*3/MM3 (ref 1.7–7)
NEUTROPHILS NFR BLD AUTO: 66.5 % (ref 42.7–76)
NRBC BLD AUTO-RTO: 0 /100 WBC (ref 0–0.2)
PLATELET # BLD AUTO: 285 10*3/MM3 (ref 140–450)
POTASSIUM SERPL-SCNC: 4.9 MMOL/L (ref 3.5–5.2)
PROT SERPL-MCNC: 7.8 G/DL (ref 6–8.5)
RBC # BLD AUTO: 5.7 10*6/MM3 (ref 4.14–5.8)
SODIUM SERPL-SCNC: 140 MMOL/L (ref 136–145)
TRIGL SERPL-MCNC: 57 MG/DL (ref 0–150)
TSH SERPL DL<=0.005 MIU/L-ACNC: 1.16 UIU/ML (ref 0.27–4.2)
VIT B12 SERPL-MCNC: 378 PG/ML (ref 211–946)
VLDLC SERPL CALC-MCNC: 10 MG/DL (ref 5–40)
WBC # BLD AUTO: 5.39 10*3/MM3 (ref 3.4–10.8)

## 2022-06-30 ENCOUNTER — OFFICE VISIT (OUTPATIENT)
Dept: GASTROENTEROLOGY | Facility: CLINIC | Age: 52
End: 2022-06-30

## 2022-06-30 VITALS
DIASTOLIC BLOOD PRESSURE: 78 MMHG | RESPIRATION RATE: 16 BRPM | HEART RATE: 72 BPM | HEIGHT: 66 IN | TEMPERATURE: 98.4 F | SYSTOLIC BLOOD PRESSURE: 116 MMHG | BODY MASS INDEX: 21.69 KG/M2 | WEIGHT: 135 LBS

## 2022-06-30 DIAGNOSIS — Z86.010 PERSONAL HISTORY OF COLONIC POLYPS: ICD-10-CM

## 2022-06-30 DIAGNOSIS — K20.0 ESOPHAGITIS, EOSINOPHILIC: Primary | ICD-10-CM

## 2022-06-30 DIAGNOSIS — Z01.812 PRE-PROCEDURE LAB EXAM: Primary | ICD-10-CM

## 2022-06-30 DIAGNOSIS — Z87.19 HISTORY OF ESOPHAGEAL STRICTURE: ICD-10-CM

## 2022-06-30 DIAGNOSIS — K21.9 GASTROESOPHAGEAL REFLUX DISEASE WITHOUT ESOPHAGITIS: ICD-10-CM

## 2022-06-30 DIAGNOSIS — Z80.0 FAMILY HISTORY OF COLON CANCER IN MOTHER: ICD-10-CM

## 2022-06-30 PROCEDURE — 99214 OFFICE O/P EST MOD 30 MIN: CPT | Performed by: INTERNAL MEDICINE

## 2022-06-30 RX ORDER — SODIUM CHLORIDE 9 MG/ML
70 INJECTION, SOLUTION INTRAVENOUS CONTINUOUS PRN
Status: CANCELLED | OUTPATIENT
Start: 2022-06-30

## 2022-06-30 RX ORDER — BISACODYL 5 MG/1
20 TABLET, DELAYED RELEASE ORAL ONCE
Qty: 4 TABLET | Refills: 0 | Status: SHIPPED | OUTPATIENT
Start: 2022-06-30 | End: 2022-06-30

## 2022-06-30 NOTE — PROGRESS NOTES
Follow Up Note     Date: 2022   Patient Name: Phani Paulson  MRN: [id number]  : 1970     Referring Physician: Annabelle Jarvis MD    Chief Complaint:    Chief Complaint   Patient presents with   • Follow-up   • EoE   • Heartburn       Interval History:   2022  Phani Paulson is a 52 y.o. male who is here today for follow up for follow-up of his eosinophilic esophagitis and to schedule surveillance colonoscopy.  He states that he is doing very well without any dysphagia or reflux symptoms now.    2020  Phani Paulson is a 50 y.o. male who is here today to establish care with Gastroenterology for evaluation of dysphagia.  The patient has difficulty swallowing off and on for the last six months. The symptom is moderate in severity, occurs occasionally once in few weeks mostly associated with solid foods.  The symptoms are progressive now.  Recently he had choking with spagetti and he had to literally cough it out after some time. Whe these happens he will drink water and walk around and gets better he says. The patient points towards the upper substernal area. He deny any odynophagia. He has history of acid reflux on and off few years. Takes occasional Tums otherwise not on any medication. He may have reflux episodes once a week or so.  Deny any nausea or vomiting.  There is no associated weight loss. No h/o food allergy.   No associated abdominal pain or distension. Deny  any change in bowel habit, hematochezia or melena.   There is no history of anemia. No prior history of EGD. He had a colonoscopy 2 years ago and few polyps removed. Family history of colon cancer- Mother had colon CA at 65yrs of age. No other any GI malignancy.   Non smoker  and no ETOH abuse.  Subjective      Past Medical History:   Past Medical History:   Diagnosis Date   • Acute suppurative otitis media of both ears without spontaneous rupture of tympanic membranes    • Chicken pox    • Esophageal  stricture    • GERD (gastroesophageal reflux disease)    • Lesion of skin of face     benign   • Seasonal allergies    • Wears glasses    • Wears glasses      Past Surgical History:   Past Surgical History:   Procedure Laterality Date   • COLONOSCOPY      4yrs    • ENDOSCOPY N/A 6/15/2020    Procedure: ESOPHAGOGASTRODUODENOSCOPY WITH BIOPSY AND DILATATION;  Surgeon: Manuel Ann MD;  Location: [facility] ENDOSCOPY;  Service: Gastroenterology;  Laterality: N/A;   • ENDOSCOPY N/A 8/21/2020    Procedure: ESOPHAGOGASTRODUODENOSCOPY WITH BIOPSY;  Surgeon: Manuel Ann MD;  Location: [facility] ENDOSCOPY;  Service: Gastroenterology;  Laterality: N/A;   • ENDOSCOPY N/A 10/30/2020    Procedure: ESOPHAGOGASTRODUODENOSCOPY WITH COLD FORCEP BIOPSY;  Surgeon: Manuel Ann MD;  Location: [facility] ENDOSCOPY;  Service: Gastroenterology;  Laterality: N/A;   • SKIN BIOPSY      face-benign       Family History:   Family History   Problem Relation Age of Onset   • Colon cancer Mother    • Hypertension Mother    • Skin cancer Father    • Cirrhosis Neg Hx    • Liver cancer Neg Hx    • Liver disease Neg Hx        Social History:   Social History     Socioeconomic History   • Marital status:    Tobacco Use   • Smoking status: Never Smoker   • Smokeless tobacco: Never Used   Vaping Use   • Vaping Use: Never used   Substance and Sexual Activity   • Alcohol use: No   • Drug use: Never   • Sexual activity: Defer       Medications:     Current Outpatient Medications:   •  Loratadine 10 MG capsule, Take 1 capsule by mouth Daily., Disp: , Rfl:   •  pantoprazole (PROTONIX) 40 MG EC tablet, TAKE 1 TABLET DAILY, Disp: 90 tablet, Rfl: 3    Allergies:   No Known Allergies    Review of Systems:   Review of Systems   Constitutional: Negative for appetite change, fatigue, fever and unexpected weight loss.   HENT: Negative for trouble swallowing.    Gastrointestinal: Negative for abdominal distention, abdominal pain, anal  "bleeding, blood in stool, constipation, diarrhea, nausea, rectal pain, vomiting, GERD and indigestion.       The following portions of the patient's history were reviewed and updated as appropriate: allergies, current medications, past family history, past medical history, past social history, past surgical history and problem list.    Objective     Physical Exam:  Vital Signs:   Vitals:    06/30/22 1608   BP: 116/78   Pulse: 72   Resp: 16   Temp: 98.4 °F (36.9 °C)   TempSrc: Infrared   Weight: 61.2 kg (135 lb)   Height: 167.6 cm (66\")       Physical Exam  Constitutional:       Appearance: Normal appearance.   HENT:      Head: Normocephalic and atraumatic.   Eyes:      Conjunctiva/sclera: Conjunctivae normal.   Abdominal:      General: Abdomen is flat. There is no distension.      Palpations: There is no mass.      Tenderness: There is no abdominal tenderness. There is no guarding or rebound.      Hernia: No hernia is present.   Musculoskeletal:      Cervical back: Normal range of motion and neck supple.   Neurological:      Mental Status: He is alert.         Results Review:   I reviewed the patient's new clinical results.    Office Visit on 06/16/2022   Component Date Value Ref Range Status   • WBC 06/16/2022 5.39  3.40 - 10.80 10*3/mm3 Final   • RBC 06/16/2022 5.70  4.14 - 5.80 10*6/mm3 Final   • Hemoglobin 06/16/2022 17.4  13.0 - 17.7 g/dL Final   • Hematocrit 06/16/2022 49.2  37.5 - 51.0 % Final   • MCV 06/16/2022 86.3  79.0 - 97.0 fL Final   • MCH 06/16/2022 30.5  26.6 - 33.0 pg Final   • MCHC 06/16/2022 35.4  31.5 - 35.7 g/dL Final   • RDW 06/16/2022 12.4  12.3 - 15.4 % Final   • Platelets 06/16/2022 285  140 - 450 10*3/mm3 Final   • Neutrophil Rel % 06/16/2022 66.5  42.7 - 76.0 % Final   • Lymphocyte Rel % 06/16/2022 17.3 (A) 19.6 - 45.3 % Final   • Monocyte Rel % 06/16/2022 11.3  5.0 - 12.0 % Final   • Eosinophil Rel % 06/16/2022 2.8  0.3 - 6.2 % Final   • Basophil Rel % 06/16/2022 1.9 (A) 0.0 - 1.5 % Final "   • Neutrophils Absolute 06/16/2022 3.59  1.70 - 7.00 10*3/mm3 Final   • Lymphocytes Absolute 06/16/2022 0.93  0.70 - 3.10 10*3/mm3 Final   • Monocytes Absolute 06/16/2022 0.61  0.10 - 0.90 10*3/mm3 Final   • Eosinophils Absolute 06/16/2022 0.15  0.00 - 0.40 10*3/mm3 Final   • Basophils Absolute 06/16/2022 0.10  0.00 - 0.20 10*3/mm3 Final   • Immature Granulocyte Rel % 06/16/2022 0.2  0.0 - 0.5 % Final   • Immature Grans Absolute 06/16/2022 0.01  0.00 - 0.05 10*3/mm3 Final   • nRBC 06/16/2022 0.0  0.0 - 0.2 /100 WBC Final   • Glucose 06/16/2022 91  65 - 99 mg/dL Final   • BUN 06/16/2022 17  6 - 20 mg/dL Final   • Creatinine 06/16/2022 1.03  0.76 - 1.27 mg/dL Final   • EGFR Result 06/16/2022 88.5  >60.0 mL/min/1.73 Final    Comment: National Kidney Foundation and American Society of  Nephrology (ASN) Task Force recommended calculation based  on the Chronic Kidney Disease Epidemiology Collaboration  (CKD-EPI) equation refit without adjustment for race.  GFR Normal >60  Chronic Kidney Disease <60  Kidney Failure <15     • BUN/Creatinine Ratio 06/16/2022 16.5  7.0 - 25.0 Final   • Sodium 06/16/2022 140  136 - 145 mmol/L Final   • Potassium 06/16/2022 4.9  3.5 - 5.2 mmol/L Final   • Chloride 06/16/2022 103  98 - 107 mmol/L Final   • Total CO2 06/16/2022 25.4  22.0 - 29.0 mmol/L Final   • Calcium 06/16/2022 9.7  8.6 - 10.5 mg/dL Final   • Total Protein 06/16/2022 7.8  6.0 - 8.5 g/dL Final   • Albumin 06/16/2022 4.70  3.50 - 5.20 g/dL Final   • Globulin 06/16/2022 3.1  gm/dL Final   • A/G Ratio 06/16/2022 1.5  g/dL Final   • Total Bilirubin 06/16/2022 0.8  0.0 - 1.2 mg/dL Final   • Alkaline Phosphatase 06/16/2022 79  39 - 117 U/L Final   • AST (SGOT) 06/16/2022 14  1 - 40 U/L Final   • ALT (SGPT) 06/16/2022 11  1 - 41 U/L Final   • Total Cholesterol 06/16/2022 217 (A) 0 - 200 mg/dL Final    Comment: Cholesterol Reference Ranges  (U.S. Department of Health and Human Services ATP III  Classifications)  Desirable           <200 mg/dL  Borderline High    200-239 mg/dL  High Risk          >240 mg/dL  Triglyceride Reference Ranges  (U.S. Department of Health and Human Services ATP III  Classifications)  Normal           <150 mg/dL  Borderline High  150-199 mg/dL  High             200-499 mg/dL  Very High        >500 mg/dL  HDL Reference Ranges  (U.S. Department of Health and Human Services ATP III  Classifications)  Low     <40 mg/dl (major risk factor for CHD)  High    >60 mg/dl ('negative' risk factor for CHD)  LDL Reference Ranges  (U.S. Department of Health and Human Services ATP III  Classifications)  Optimal          <100 mg/dL  Near Optimal     100-129 mg/dL  Borderline High  130-159 mg/dL  High             160-189 mg/dL  Very High        >189 mg/dL     • Triglycerides 06/16/2022 57  0 - 150 mg/dL Final   • HDL Cholesterol 06/16/2022 57  40 - 60 mg/dL Final   • VLDL Cholesterol Tony 06/16/2022 10  5 - 40 mg/dL Final   • LDL Chol Calc (NIH) 06/16/2022 150 (A) 0 - 100 mg/dL Final   • Hemoglobin A1C 06/16/2022 5.10  4.80 - 5.60 % Final    Comment: Hemoglobin A1C Ranges:  Increased Risk for Diabetes  5.7% to 6.4%  Diabetes                     >= 6.5%  Diabetic Goal                < 7.0%     • TSH 06/16/2022 1.160  0.270 - 4.200 uIU/mL Final   • Vitamin B-12 06/16/2022 378  211 - 946 pg/mL Final    Results may be falsely increased if patient taking Biotin.   • 25 Hydroxy, Vitamin D 06/16/2022 30.0  30.0 - 100.0 ng/ml Final    Comment: Results may be falsely increased if patient taking Biotin.  Reference Range for Total Vitamin D 25(OH)  Deficiency <20.0 ng/mL  Insufficiency 21-29 ng/mL  Sufficiency  ng/mL  Toxicity >100 ng/ml        No radiology results for the last 90 days.    Assessment / Plan      1.  History of PPI responsive eosinophilic esophagitis  2.  History of esophageal stricture  3.  History of Schatzki's ring GE junction  4.  Gastroesophageal reflux disease without esophagitis  6/30/2022  He is doing very well without  any dysphagia or any reflux symptoms now.  Is currently on a Protonix 40 mg p.o. daily dose.  We will schedule him for an EGD for surveillance examination of the esophagus for his EOE on treatment.  He is following the dietary restrictions.    12/10/2020  His EGD done on 10/30/2020 again revealed signs of EOE with a ERESS  score of 2.   His esophageal biopsies at this time showed a chronic inflammation and reactive changes with the eosinophils 10 per high-power field.  His pathology findings significant improved from prior finding of 38 eosinophils per high-power field. He had an EGD done on 6/15/2020.  EGD revealed a mild upper esophageal stricture.  He had a esophageal savory dilatation performed and had multiple biopsies obtained to rule out EOE.  His biopsies revealed a 38 eosinophils per high-power field suggesting eosinophilic esophagitis.  He did not have any classical signs of EOE and endoscopy however he did have a mild upper esophageal narrowing.  EGD also showed small hiatal hernia.  Gastric biopsies were negative for H. pylori.  Lab reports and pathology reports have been discussed with the patient.  His food allergy profile came out positive for allergy to cows milk, corn, sesame seed. I have advised to avoid about.  We will treat him with high-dose PPI for 8 weeks to rule out PPI responsive EOE     5. Personal history of colonic polyps  6. Family hx of colon cancer  He had a colonoscopy done about 5 years ago at [facility] and had a polyps removed and advised to repeat the colonoscopy in 5 years time.  His mother had a colon cancer at the age of 65 he is due for both high risk screening and surveillance colonoscopy.    The indications, technique, alternatives and potential risk and complications were discussed with the patient including but not limited to bleeding, bowel perforations, missing lesions and anesthetic complications. The patient understands and wishes to proceed with the procedure and has  given their verbal consent. Written patient education information was given to the patient.   The patient will call if they have further questions before procedure.           Follow Up:   No follow-ups on file.    Manuel Ann MD  [facility]  6/30/2022  16:10 EDT     Please note that portions of this note may have been completed with a voice recognition program.

## 2022-07-01 PROBLEM — Z86.0100 PERSONAL HISTORY OF COLONIC POLYPS: Status: ACTIVE | Noted: 2022-07-01

## 2022-07-01 PROBLEM — Z86.010 PERSONAL HISTORY OF COLONIC POLYPS: Status: ACTIVE | Noted: 2022-07-01

## 2022-07-01 PROBLEM — Z80.0 FAMILY HISTORY OF COLON CANCER IN MOTHER: Status: ACTIVE | Noted: 2022-07-01

## 2022-10-04 ENCOUNTER — TELEPHONE (OUTPATIENT)
Dept: GASTROENTEROLOGY | Facility: CLINIC | Age: 52
End: 2022-10-04

## 2022-10-04 NOTE — TELEPHONE ENCOUNTER
Called patient to confirm colonoscopy and upper endoscopy for 10/07/22. Reached recording stating that the person you called has a voicemail box that hasn't been set up yet. Unable to leave message

## 2022-10-06 NOTE — PRE-PROCEDURE INSTRUCTIONS
PAT phone history completed with pt for upcoming procedure    PAT PASS GIVEN/REVIEWED WITH PT.  VERBALIZED UNDERSTANDING OF THE FOLLOWING:  DO NOT EAT, DRINK, SMOKE, USE SMOKELESS TOBACCO OR CHEW GUM AFTER MIDNIGHT THE NIGHT BEFORE SURGERY.  THIS ALSO INCLUDES HARD CANDIES AND MINTS.    DO NOT SHAVE THE AREA TO BE OPERATED ON AT LEAST 48 HOURS PRIOR TO THE PROCEDURE.  DO NOT WEAR MAKE UP OR NAIL POLISH.  DO NOT LEAVE IN ANY PIERCING OR WEAR JEWELRY THE DAY OF SURGERY.      DO NOT USE ADHESIVES IF YOU WEAR DENTURES.    DO NOT WEAR EYE CONTACTS; BRING IN YOUR GLASSES.    ONLY TAKE MEDICATION THE MORNING OF YOUR PROCEDURE IF INSTRUCTED BY YOUR SURGEON WITH ENOUGH WATER TO SWALLOW THE MEDICATION.  IF YOUR SURGEON DID NOT SPECIFY WHICH MEDICATIONS TO TAKE, YOU WILL NEED TO CALL THEIR OFFICE FOR FURTHER INSTRUCTIONS AND DO AS THEY INSTRUCT.    LEAVE ANYTHING YOU CONSIDER VALUABLE AT HOME.    YOU WILL NEED TO ARRANGE FOR SOMEONE TO DRIVE YOU HOME AFTER SURGERY.  IT IS RECOMMENDED THAT YOU DO NOT DRIVE, WORK, DRINK ALCOHOL OR MAKE MAJOR DECISIONS FOR AT LEAST 24 HOURS AFTER YOUR PROCEDURE IS COMPLETE.      THE DAY OF YOUR PROCEDURE, BRING IN THE FOLLOWING IF APPLICABLE:   PICTURE ID AND INSURANCE/MEDICARE OR MEDICAID CARDS/ANY CO-PAY THAT MAY BE DUE   COPY OF ADVANCED DIRECTIVE/LIVING WILL/POWER OR    CPAP/BIPAP/INHALERS   SKIN PREP SHEET   YOUR PREADMISSION TESTING PASS (IF NOT A PHONE HISTORY)

## 2022-10-07 ENCOUNTER — ANESTHESIA EVENT (OUTPATIENT)
Dept: GASTROENTEROLOGY | Facility: HOSPITAL | Age: 52
End: 2022-10-07

## 2022-10-07 ENCOUNTER — ANESTHESIA (OUTPATIENT)
Dept: GASTROENTEROLOGY | Facility: HOSPITAL | Age: 52
End: 2022-10-07

## 2022-10-07 ENCOUNTER — HOSPITAL ENCOUNTER (OUTPATIENT)
Facility: HOSPITAL | Age: 52
Setting detail: HOSPITAL OUTPATIENT SURGERY
Discharge: HOME | End: 2022-10-07
Attending: INTERNAL MEDICINE | Admitting: INTERNAL MEDICINE

## 2022-10-07 VITALS
DIASTOLIC BLOOD PRESSURE: 92 MMHG | SYSTOLIC BLOOD PRESSURE: 142 MMHG | BODY MASS INDEX: 21.69 KG/M2 | OXYGEN SATURATION: 99 % | HEIGHT: 66 IN | RESPIRATION RATE: 16 BRPM | TEMPERATURE: 97.2 F | WEIGHT: 135 LBS | HEART RATE: 74 BPM

## 2022-10-07 DIAGNOSIS — Z86.010 PERSONAL HISTORY OF COLONIC POLYPS: ICD-10-CM

## 2022-10-07 DIAGNOSIS — K20.0 ESOPHAGITIS, EOSINOPHILIC: ICD-10-CM

## 2022-10-07 DIAGNOSIS — Z80.0 FAMILY HISTORY OF COLON CANCER IN MOTHER: ICD-10-CM

## 2022-10-07 PROCEDURE — 25010000002 PROPOFOL 10 MG/ML EMULSION: Performed by: NURSE ANESTHETIST, CERTIFIED REGISTERED

## 2022-10-07 PROCEDURE — 43239 EGD BIOPSY SINGLE/MULTIPLE: CPT | Performed by: INTERNAL MEDICINE

## 2022-10-07 PROCEDURE — 88305 TISSUE EXAM BY PATHOLOGIST: CPT

## 2022-10-07 PROCEDURE — 45385 COLONOSCOPY W/LESION REMOVAL: CPT | Performed by: INTERNAL MEDICINE

## 2022-10-07 RX ORDER — ONDANSETRON 2 MG/ML
4 INJECTION INTRAMUSCULAR; INTRAVENOUS ONCE AS NEEDED
Status: CANCELLED | OUTPATIENT
Start: 2022-10-07 | End: 2022-10-07

## 2022-10-07 RX ORDER — PROPOFOL 10 MG/ML
VIAL (ML) INTRAVENOUS AS NEEDED
Status: DISCONTINUED | OUTPATIENT
Start: 2022-10-07 | End: 2022-10-07 | Stop reason: SURG

## 2022-10-07 RX ORDER — KETAMINE HYDROCHLORIDE 50 MG/ML
INJECTION, SOLUTION, CONCENTRATE INTRAMUSCULAR; INTRAVENOUS AS NEEDED
Status: DISCONTINUED | OUTPATIENT
Start: 2022-10-07 | End: 2022-10-07 | Stop reason: SURG

## 2022-10-07 RX ORDER — SIMETHICONE 20 MG/.3ML
EMULSION ORAL AS NEEDED
Status: DISCONTINUED | OUTPATIENT
Start: 2022-10-07 | End: 2022-10-07 | Stop reason: HOSPADM

## 2022-10-07 RX ORDER — LIDOCAINE HYDROCHLORIDE 20 MG/ML
INJECTION, SOLUTION INTRAVENOUS AS NEEDED
Status: DISCONTINUED | OUTPATIENT
Start: 2022-10-07 | End: 2022-10-07 | Stop reason: SURG

## 2022-10-07 RX ORDER — SODIUM CHLORIDE 9 MG/ML
70 INJECTION, SOLUTION INTRAVENOUS CONTINUOUS PRN
Status: DISCONTINUED | OUTPATIENT
Start: 2022-10-07 | End: 2022-10-07 | Stop reason: HOSPADM

## 2022-10-07 RX ORDER — MAGNESIUM HYDROXIDE 1200 MG/15ML
LIQUID ORAL AS NEEDED
Status: DISCONTINUED | OUTPATIENT
Start: 2022-10-07 | End: 2022-10-07 | Stop reason: HOSPADM

## 2022-10-07 RX ADMIN — LIDOCAINE HYDROCHLORIDE 60 MG: 20 INJECTION, SOLUTION INTRAVENOUS at 10:21

## 2022-10-07 RX ADMIN — SODIUM CHLORIDE 70 ML/HR: 9 INJECTION, SOLUTION INTRAVENOUS at 09:16

## 2022-10-07 RX ADMIN — PROPOFOL 500 MG: 10 INJECTION, EMULSION INTRAVENOUS at 10:21

## 2022-10-07 RX ADMIN — KETAMINE HYDROCHLORIDE 25 MG: 50 INJECTION, SOLUTION INTRAMUSCULAR; INTRAVENOUS at 10:21

## 2022-10-07 NOTE — DISCHARGE INSTRUCTIONS
- Discharge patient to home (ambulatory).   - Resume previous diet.   - Avoid NSAIDs  - PPI daily  - Continue present medications.   - Await pathology results.   - Repeat colonoscopy in 5 years for surveillance and high risk screening.   - Return to GI office in 8 -12weeks.     To assist you in voiding:  Drink plenty of fluids  Listen to running water while attempting to void.    If you are unable to urinate and you have an uncomfortable urge to void or it has been   6 hours since you were discharged, return to the Emergency Room     No pushing, pulling, tugging,  heavy lifting, or strenuous activity.  No major decision making, driving, or drinking alcoholic beverages for 24 hours. ( due to the medications you have  received)  Always use good hand hygiene/washing techniques.  NO driving while taking pain medications.

## 2022-10-07 NOTE — ANESTHESIA PREPROCEDURE EVALUATION
Anesthesia Evaluation     Patient summary reviewed and Nursing notes reviewed   no history of anesthetic complications:  NPO Solid Status: > 8 hours  NPO Liquid Status: > 8 hours           Airway   Mallampati: II  TM distance: >3 FB  Neck ROM: full  no difficulty expected  Dental - normal exam     Pulmonary - negative pulmonary ROS and normal exam   Cardiovascular - normal exam    Rhythm: regular  Rate: normal    (+) hyperlipidemia,       Neuro/Psych- negative ROS  GI/Hepatic/Renal/Endo    (+)  GERD,      Musculoskeletal (-) negative ROS    Abdominal    Substance History - negative use     OB/GYN negative ob/gyn ROS         Other - negative ROS                       Anesthesia Plan    ASA 2     MAC     (Pt told that intravenous sedation will be used as the primary anesthetic along with local anesthesia if necessary. Every effort will be made to make sure the patient is comfortable.     The patient was told they may or may not have recall for the procedure. It was further explained that if the MAC was not adequate that a general anesthetic with either an LMA or endotracheal tube would be required.     Will proceed with the plan of care.)  intravenous induction     Anesthetic plan, risks, benefits, and alternatives have been provided, discussed and informed consent has been obtained with: patient.        CODE STATUS:

## 2022-10-07 NOTE — ANESTHESIA POSTPROCEDURE EVALUATION
Patient: Phani Paulson    Procedure Summary     Date: 10/07/22 Room / Location: [facility] ENDOSCOPY 2 / [facility] ENDOSCOPY    Anesthesia Start: 1017 Anesthesia Stop: 1054    Procedures:       ESOPHAGOGASTRODUODENOSCOPY WITH BIOPSY (N/A Esophagus)      COLONOSCOPY WITH POLYPECTOMY X1 (N/A Anus) Diagnosis:       Esophagitis, eosinophilic      Personal history of colonic polyps      Family history of colon cancer in mother      (Esophagitis, eosinophilic [K20.0])      (Personal history of colonic polyps [Z86.010])      (Family history of colon cancer in mother [Z80.0])    Surgeons: Manuel Ann MD Provider: Kaden Cheng CRNA    Anesthesia Type: MAC ASA Status: 2          Anesthesia Type: MAC    Vitals  Vitals Value Taken Time   /99 10/07/22 1125   Temp 97.7 °F (36.5 °C) 10/07/22 1125   Pulse 89 10/07/22 1125   Resp 16 10/07/22 1125   SpO2 98 % 10/07/22 1125           Post Anesthesia Care and Evaluation    Patient location during evaluation: PHASE II  Patient participation: complete - patient participated  Level of consciousness: awake  Pain score: 1  Pain management: adequate    Airway patency: patent  Anesthetic complications: No anesthetic complications  PONV Status: controlled  Cardiovascular status: acceptable and stable  Respiratory status: acceptable  Hydration status: acceptable

## 2022-10-07 NOTE — H&P
[facility]  HISTORY AND PHYSICAL    Patient Name: Phani Paulson  : 1970  MRN: [id number]    Chief Complaint:   For EGD/surveillance colonoscopy    History Of Presenting Illness:    H/o EoE  GERD  H/o colon polyps     Past Medical History:   Diagnosis Date   • Acute suppurative otitis media of both ears without spontaneous rupture of tympanic membranes    • Chicken pox    • Esophageal stricture    • GERD (gastroesophageal reflux disease)    • Lesion of skin of face     benign   • Seasonal allergies    • Wears glasses    • Wears glasses        Past Surgical History:   Procedure Laterality Date   • COLONOSCOPY      4yrs    • ENDOSCOPY N/A 6/15/2020    Procedure: ESOPHAGOGASTRODUODENOSCOPY WITH BIOPSY AND DILATATION;  Surgeon: Manuel Ann MD;  Location: [facility] ENDOSCOPY;  Service: Gastroenterology;  Laterality: N/A;   • ENDOSCOPY N/A 2020    Procedure: ESOPHAGOGASTRODUODENOSCOPY WITH BIOPSY;  Surgeon: Manuel Ann MD;  Location: [facility] ENDOSCOPY;  Service: Gastroenterology;  Laterality: N/A;   • ENDOSCOPY N/A 10/30/2020    Procedure: ESOPHAGOGASTRODUODENOSCOPY WITH COLD FORCEP BIOPSY;  Surgeon: Manuel Ann MD;  Location: [facility] ENDOSCOPY;  Service: Gastroenterology;  Laterality: N/A;   • SKIN BIOPSY      face-benign       Social History     Socioeconomic History   • Marital status:    Tobacco Use   • Smoking status: Never Smoker   • Smokeless tobacco: Never Used   Vaping Use   • Vaping Use: Never used   Substance and Sexual Activity   • Alcohol use: No   • Drug use: Never   • Sexual activity: Defer       Family History   Problem Relation Age of Onset   • Colon cancer Mother    • Hypertension Mother    • Skin cancer Father    • Cirrhosis Neg Hx    • Liver cancer Neg Hx    • Liver disease Neg Hx        Prior to Admission Medications:  Medications Prior to Admission   Medication Sig Dispense Refill Last Dose   • Loratadine 10 MG capsule Take 1  capsule by mouth Daily.   10/6/2022 at 0800   • magnesium citrate solution Dispense three bottles for colonoscopy prep. Please see prep instructions from office. 888 mL 0 10/7/2022 at 0400   • pantoprazole (PROTONIX) 40 MG EC tablet TAKE 1 TABLET DAILY 90 tablet 3 10/6/2022 at 0800       Allergies:  No Known Allergies     Vitals: Temp:  [97.1 °F (36.2 °C)] 97.1 °F (36.2 °C)  Heart Rate:  [108] 108  Resp:  [16] 16  BP: (128)/(97) 128/97    Review Of Systems:  Constitutional:  Negative for chills, fever, and unexpected weight change.  Respiratory:  Negative for cough, chest tightness, shortness of breath, and wheezing.  Cardiovascular:  Negative for chest pain, palpitations, and leg swelling.  Gastrointestinal:  Negative for abdominal distention, abdominal pain, Nausea, vomiting.  Neurological:  Negative for Weakness, numbness, and headaches.     Physical Exam:    General Appearance:  Alert, cooperative, in no acute distress.   Lungs:   Clear to auscultation, respirations regular, even and                 unlabored.   Heart:  Regular rhythm and normal rate.   Abdomen:   Normal bowel sounds, no masses, no organomegaly. Soft, non-tender, non-distended   Neurologic: Alert and oriented x 3. Moves all four limbs equally       Plan: ESOPHAGOGASTRODUODENOSCOPY (N/A), COLONOSCOPY (N/A)     Manuel Ann MD  10/7/2022

## 2022-10-10 LAB — REF LAB TEST METHOD: NORMAL

## 2023-01-09 ENCOUNTER — OFFICE VISIT (OUTPATIENT)
Dept: GASTROENTEROLOGY | Facility: CLINIC | Age: 53
End: 2023-01-09
Payer: COMMERCIAL

## 2023-01-09 VITALS
HEART RATE: 71 BPM | DIASTOLIC BLOOD PRESSURE: 85 MMHG | HEIGHT: 66 IN | SYSTOLIC BLOOD PRESSURE: 117 MMHG | RESPIRATION RATE: 16 BRPM | TEMPERATURE: 98.2 F | WEIGHT: 139 LBS | BODY MASS INDEX: 22.34 KG/M2

## 2023-01-09 DIAGNOSIS — K63.5 HYPERPLASTIC COLONIC POLYP, UNSPECIFIED PART OF COLON: ICD-10-CM

## 2023-01-09 DIAGNOSIS — K20.0 PROTON PUMP INHIBITOR-RESPONSIVE EOSINOPHILIC ESOPHAGITIS: Primary | ICD-10-CM

## 2023-01-09 DIAGNOSIS — K31.89 EROSIVE GASTROPATHY: ICD-10-CM

## 2023-01-09 DIAGNOSIS — K21.9 GASTROESOPHAGEAL REFLUX DISEASE, UNSPECIFIED WHETHER ESOPHAGITIS PRESENT: ICD-10-CM

## 2023-01-09 PROCEDURE — 99214 OFFICE O/P EST MOD 30 MIN: CPT | Performed by: INTERNAL MEDICINE

## 2023-01-09 RX ORDER — VALACYCLOVIR HYDROCHLORIDE 500 MG/1
500 TABLET, FILM COATED ORAL 2 TIMES DAILY
COMMUNITY
Start: 2022-12-06

## 2023-01-09 RX ORDER — PANTOPRAZOLE SODIUM 20 MG/1
20 TABLET, DELAYED RELEASE ORAL DAILY
Qty: 90 TABLET | Refills: 3 | Status: SHIPPED | OUTPATIENT
Start: 2023-01-09 | End: 2023-01-09

## 2023-01-09 RX ORDER — PANTOPRAZOLE SODIUM 20 MG/1
20 TABLET, DELAYED RELEASE ORAL DAILY
Qty: 90 TABLET | Refills: 3 | Status: SHIPPED | OUTPATIENT
Start: 2023-01-09 | End: 2023-04-09

## 2023-08-25 ENCOUNTER — OFFICE VISIT (OUTPATIENT)
Dept: INTERNAL MEDICINE | Facility: CLINIC | Age: 53
End: 2023-08-25
Payer: COMMERCIAL

## 2023-08-25 VITALS
BODY MASS INDEX: 21.69 KG/M2 | TEMPERATURE: 97.7 F | HEIGHT: 66 IN | HEART RATE: 70 BPM | OXYGEN SATURATION: 98 % | DIASTOLIC BLOOD PRESSURE: 84 MMHG | SYSTOLIC BLOOD PRESSURE: 128 MMHG | WEIGHT: 135 LBS

## 2023-08-25 DIAGNOSIS — U07.1 COVID-19: Primary | ICD-10-CM

## 2023-08-25 LAB
EXPIRATION DATE: ABNORMAL
FLUAV AG UPPER RESP QL IA.RAPID: NOT DETECTED
FLUBV AG UPPER RESP QL IA.RAPID: NOT DETECTED
INTERNAL CONTROL: ABNORMAL
Lab: ABNORMAL
SARS-COV-2 AG UPPER RESP QL IA.RAPID: DETECTED

## 2023-08-25 PROCEDURE — 99213 OFFICE O/P EST LOW 20 MIN: CPT | Performed by: NURSE PRACTITIONER

## 2023-08-25 PROCEDURE — 87428 SARSCOV & INF VIR A&B AG IA: CPT | Performed by: NURSE PRACTITIONER

## 2023-08-25 RX ORDER — DEXTROMETHORPHAN HYDROBROMIDE AND PROMETHAZINE HYDROCHLORIDE 15; 6.25 MG/5ML; MG/5ML
5 SYRUP ORAL 4 TIMES DAILY PRN
Qty: 100 ML | Refills: 0 | Status: SHIPPED | OUTPATIENT
Start: 2023-08-25

## 2023-08-25 NOTE — PROGRESS NOTES
"  Office Visit      Patient Name: Phani Paulson  : 1970   MRN: [id number]   Care Team: Patient Care Team:  Annabelle Jarvis MD as PCP - General (Internal Medicine)    Chief Complaint  Sore Throat, Cough (Since yesterday. ), Nasal Congestion, Ear Fullness, and URI    Subjective     Subjective      Phani Paulson present for URI symptoms.  Onset yesterday, 2023. Endorses sore throat, cough, nasal congestion, and ear fullness. He tried using Claritin over the counter, thought it was allergy related. No sick contacts. He has had the initial COVID-19 series, no boosters. He had an influenza vaccine last season. No chest pain, shortness of air, nausea, vomiting, diarrhea, or fever.    Objective     Objective   Vital Signs:   /84   Pulse 70   Temp 97.7 øF (36.5 øC)   Ht 167.6 cm (65.98\")   Wt 61.2 kg (135 lb)   SpO2 98%   BMI 21.80 kg/mý     Physical Exam  Vitals and nursing note reviewed.   Constitutional:       General: He is not in acute distress.     Appearance: Normal appearance. He is not ill-appearing, toxic-appearing or diaphoretic.   HENT:      Head: Normocephalic and atraumatic.      Right Ear: Ear canal and external ear normal. A middle ear effusion is present.      Left Ear: Ear canal and external ear normal. A middle ear effusion is present.      Nose: Congestion and rhinorrhea present. No mucosal edema. Rhinorrhea is clear.      Right Sinus: No maxillary sinus tenderness or frontal sinus tenderness.      Left Sinus: No maxillary sinus tenderness or frontal sinus tenderness.      Mouth/Throat:      Lips: Pink.      Mouth: Mucous membranes are moist.      Pharynx: Oropharynx is clear. Uvula midline. Posterior oropharyngeal erythema present. No pharyngeal swelling, oropharyngeal exudate or uvula swelling.      Comments: Clear PND  Eyes:      General: Lids are normal.      Conjunctiva/sclera:      Right eye: No exudate.     Left eye: No exudate.     Pupils: Pupils are equal, " round, and reactive to light.   Neck:      Trachea: Phonation normal.   Cardiovascular:      Rate and Rhythm: Normal rate and regular rhythm.      Heart sounds: Normal heart sounds.   Pulmonary:      Effort: Pulmonary effort is normal.      Breath sounds: Normal breath sounds.   Abdominal:      Tenderness: There is no abdominal tenderness.   Musculoskeletal:         General: Normal range of motion.      Cervical back: Normal range of motion and neck supple.   Lymphadenopathy:      Cervical: No cervical adenopathy.   Skin:     General: Skin is warm and dry.      Coloration: Skin is not pale.   Neurological:      General: No focal deficit present.      Mental Status: He is alert and oriented to person, place, and time.   Psychiatric:         Mood and Affect: Mood normal.         Behavior: Behavior normal.        Labs:   Results for orders placed or performed in visit on 08/25/23   POCT SARS-CoV-2 Antigen RORY + Flu    Specimen: Swab   Result Value Ref Range    SARS Antigen Detected (A) Not Detected, Presumptive Negative    Influenza A Antigen RORY Not Detected Not Detected    Influenza B Antigen RORY Not Detected Not Detected    Internal Control Passed Passed    Lot Number [id number]     Expiration Date 04/10/24      Assessment / Plan      Assessment & Plan   Problem List Items Addressed This Visit    None  Visit Diagnoses       COVID-19    -  Primary    Relevant Medications    promethazine-dextromethorphan (PROMETHAZINE-DM) 6.25-15 MG/5ML syrup    Other Relevant Orders    POCT SARS-CoV-2 Antigen RORY + Flu (Completed)          You tested positive for COVID-19.  You will need to quarantine until Wednesday and can leave quarantine as long as you are fever free with symptom improvement.  Use one area of the home for you - bedroom and bathroom to yourself. Stay 6 feet away from others in the home. If you are in a common area, you should stay 6 feet away and both be wearing a mask. You will need to wear a mask for 5 additional  days after your quarantine. Recommended to quarantine for up to 10 days if you are having worsening symptoms/fever.  Sent cough medication to pharmacy.  Encouraged Mucinex with lots of water, rest, deep breathing exercises, saline rinses. Continue tylenol prn for fever, body aches, headaches.  Notify others that you may have exposed and have them quarantine and test appropriately based on their vaccination status.  Go to the ER if any respiratory distress.      Follow Up   Return if symptoms worsen or fail to improve.  Patient was given instructions and counseling regarding his condition or for health maintenance advice. Please see specific information pulled into the AVS if appropriate.     KIMBERLY Alonso  [facility]      Transcribed from ambient dictation for KIMBERLY Stone by Carey Allison.  08/25/23   11:33 EDT    I have personally performed the services described in this document as transcribed by the above individual, and it is both accurate and complete.

## 2023-08-25 NOTE — LETTER
August 25, 2023     Patient: Phani Paulson   YOB: 1970   Date of Visit: 8/25/2023       To Whom It May Concern:    It is my medical opinion that Phani Paulson may return to work Wednesday 9/30/23 due to testing positive for COVID-19.          Sincerely,        Nasrin Hodge, APRN

## 2023-12-18 RX ORDER — PANTOPRAZOLE SODIUM 20 MG/1
20 TABLET, DELAYED RELEASE ORAL DAILY
Qty: 90 TABLET | Refills: 3 | Status: SHIPPED | OUTPATIENT
Start: 2023-12-18

## 2024-01-08 ENCOUNTER — OFFICE VISIT (OUTPATIENT)
Dept: GASTROENTEROLOGY | Facility: CLINIC | Age: 54
End: 2024-01-08
Payer: COMMERCIAL

## 2024-01-08 ENCOUNTER — LAB (OUTPATIENT)
Dept: LAB | Facility: HOSPITAL | Age: 54
End: 2024-01-08
Payer: COMMERCIAL

## 2024-01-08 VITALS
WEIGHT: 144 LBS | HEART RATE: 72 BPM | BODY MASS INDEX: 23.14 KG/M2 | SYSTOLIC BLOOD PRESSURE: 138 MMHG | HEIGHT: 66 IN | TEMPERATURE: 98.4 F | DIASTOLIC BLOOD PRESSURE: 91 MMHG

## 2024-01-08 DIAGNOSIS — Z87.898 HISTORY OF DYSPHAGIA: ICD-10-CM

## 2024-01-08 DIAGNOSIS — D58.2 ELEVATED HEMOGLOBIN: ICD-10-CM

## 2024-01-08 DIAGNOSIS — D58.2 ELEVATED HEMOGLOBIN: Primary | ICD-10-CM

## 2024-01-08 DIAGNOSIS — K20.0 ESOPHAGITIS, EOSINOPHILIC: ICD-10-CM

## 2024-01-08 LAB
BASOPHILS # BLD AUTO: 0.12 10*3/MM3 (ref 0–0.2)
BASOPHILS NFR BLD AUTO: 1.4 % (ref 0–1.5)
DEPRECATED RDW RBC AUTO: 39 FL (ref 37–54)
EOSINOPHIL # BLD AUTO: 0.22 10*3/MM3 (ref 0–0.4)
EOSINOPHIL NFR BLD AUTO: 2.7 % (ref 0.3–6.2)
ERYTHROCYTE [DISTWIDTH] IN BLOOD BY AUTOMATED COUNT: 12.5 % (ref 12.3–15.4)
HCT VFR BLD AUTO: 47.7 % (ref 37.5–51)
HGB BLD-MCNC: 16.6 G/DL (ref 13–17.7)
IMM GRANULOCYTES # BLD AUTO: 0.02 10*3/MM3 (ref 0–0.05)
IMM GRANULOCYTES NFR BLD AUTO: 0.2 % (ref 0–0.5)
LYMPHOCYTES # BLD AUTO: 1.76 10*3/MM3 (ref 0.7–3.1)
LYMPHOCYTES NFR BLD AUTO: 21.2 % (ref 19.6–45.3)
MCH RBC QN AUTO: 29.9 PG (ref 26.6–33)
MCHC RBC AUTO-ENTMCNC: 34.8 G/DL (ref 31.5–35.7)
MCV RBC AUTO: 85.9 FL (ref 79–97)
MONOCYTES # BLD AUTO: 0.94 10*3/MM3 (ref 0.1–0.9)
MONOCYTES NFR BLD AUTO: 11.3 % (ref 5–12)
NEUTROPHILS NFR BLD AUTO: 5.23 10*3/MM3 (ref 1.7–7)
NEUTROPHILS NFR BLD AUTO: 63.2 % (ref 42.7–76)
NRBC BLD AUTO-RTO: 0 /100 WBC (ref 0–0.2)
PLATELET # BLD AUTO: 301 10*3/MM3 (ref 140–450)
PMV BLD AUTO: 9.3 FL (ref 6–12)
RBC # BLD AUTO: 5.55 10*6/MM3 (ref 4.14–5.8)
WBC NRBC COR # BLD AUTO: 8.29 10*3/MM3 (ref 3.4–10.8)

## 2024-01-08 PROCEDURE — 85025 COMPLETE CBC W/AUTO DIFF WBC: CPT

## 2024-01-08 PROCEDURE — 36415 COLL VENOUS BLD VENIPUNCTURE: CPT

## 2024-01-08 PROCEDURE — 99213 OFFICE O/P EST LOW 20 MIN: CPT | Performed by: INTERNAL MEDICINE

## 2024-01-08 NOTE — PROGRESS NOTES
Follow Up Note     Date: 2024   Patient Name: Phani Paulson  MRN: [id number]  : 1970     Referring Physician: Annabelle Jarvis MD    Chief Complaint:    Chief Complaint   Patient presents with    EoE    Heartburn    Colon Polyps       Interval History:   2024  Phani Paulson is a 53 y.o. male who is here today for follow up for his EOE.  He denies any new symptoms.  Reflux symptoms no significant swallowing issues.     2020  Phani Paulson is a 50 y.o. male who is here today to establish care with Gastroenterology for evaluation of dysphagia.  The patient has difficulty swallowing off and on for the last six months. The symptom is moderate in severity, occurs occasionally once in few weeks mostly associated with solid foods.  The symptoms are progressive now.  Recently he had choking with spagetti and he had to literally cough it out after some time. Whe these happens he will drink water and walk around and gets better he says. The patient points towards the upper substernal area. He deny any odynophagia. He has history of acid reflux on and off few years. Takes occasional Tums otherwise not on any medication. He may have reflux episodes once a week or so.  Deny any nausea or vomiting.  There is no associated weight loss. No h/o food allergy.  No associated abdominal pain or distension. Deny  any change in bowel habit, hematochezia or melena.   There is no history of anemia. No prior history of EGD. He had a colonoscopy 2 years ago and few polyps removed. Family history of colon cancer- Mother had colon CA at 65yrs of age. No other any GI malignancy.  Non smoker  and no ETOH abuse    Subjective      Past Medical History:   Past Medical History:   Diagnosis Date    Acute suppurative otitis media of both ears without spontaneous rupture of tympanic membranes     Chicken pox     Esophageal stricture     GERD (gastroesophageal reflux disease)     Lesion of skin of face     benign     Seasonal allergies     Wears glasses      Past Surgical History:   Past Surgical History:   Procedure Laterality Date    COLONOSCOPY      4yrs     COLONOSCOPY N/A 10/7/2022    Procedure: COLONOSCOPY WITH POLYPECTOMY X1;  Surgeon: Manuel Ann MD;  Location: [facility] ENDOSCOPY;  Service: Gastroenterology;  Laterality: N/A;    ENDOSCOPY N/A 6/15/2020    Procedure: ESOPHAGOGASTRODUODENOSCOPY WITH BIOPSY AND DILATATION;  Surgeon: Manuel Ann MD;  Location: [facility] ENDOSCOPY;  Service: Gastroenterology;  Laterality: N/A;    ENDOSCOPY N/A 8/21/2020    Procedure: ESOPHAGOGASTRODUODENOSCOPY WITH BIOPSY;  Surgeon: Manuel Ann MD;  Location: [facility] ENDOSCOPY;  Service: Gastroenterology;  Laterality: N/A;    ENDOSCOPY N/A 10/30/2020    Procedure: ESOPHAGOGASTRODUODENOSCOPY WITH COLD FORCEP BIOPSY;  Surgeon: Manuel Ann MD;  Location: [facility] ENDOSCOPY;  Service: Gastroenterology;  Laterality: N/A;    ENDOSCOPY N/A 10/7/2022    Procedure: ESOPHAGOGASTRODUODENOSCOPY WITH BIOPSY;  Surgeon: Manuel Ann MD;  Location: [facility] ENDOSCOPY;  Service: Gastroenterology;  Laterality: N/A;    SKIN BIOPSY      face-benign       Family History:   Family History   Problem Relation Age of Onset    Colon cancer Mother     Hypertension Mother     Skin cancer Father     Cirrhosis Neg Hx     Liver cancer Neg Hx     Liver disease Neg Hx        Social History:   Social History     Socioeconomic History    Marital status:    Tobacco Use    Smoking status: Never    Smokeless tobacco: Never   Vaping Use    Vaping Use: Never used   Substance and Sexual Activity    Alcohol use: No    Drug use: Never    Sexual activity: Defer       Medications:     Current Outpatient Medications:     Loratadine 10 MG capsule, Take 1 capsule by mouth Daily., Disp: , Rfl:     pantoprazole (PROTONIX) 20 MG EC tablet, TAKE 1 TABLET DAILY, Disp: 90 tablet, Rfl: 3    valACYclovir (VALTREX) 500 MG tablet, Take 1 tablet  "by mouth 2 (Two) Times a Day., Disp: , Rfl:     Allergies:   No Known Allergies    Review of Systems:   Review of Systems   Constitutional:  Negative for appetite change, fatigue, fever and unexpected weight loss.   HENT:  Negative for trouble swallowing.    Gastrointestinal:  Negative for abdominal distention, abdominal pain, anal bleeding, blood in stool, constipation, diarrhea, nausea, rectal pain, vomiting, GERD and indigestion.       The following portions of the patient's history were reviewed and updated as appropriate: allergies, current medications, past family history, past medical history, past social history, past surgical history and problem list.    Objective     Physical Exam:  Vital Signs:   Vitals:    01/08/24 1618   BP: 138/91   Pulse: 72   Temp: 98.4 °F (36.9 °C)   TempSrc: Infrared   Weight: 65.3 kg (144 lb)  Comment: with clothing/shoes   Height: 167.6 cm (66\")  Comment: per patient       Physical Exam  Constitutional:       Appearance: Normal appearance.   HENT:      Head: Normocephalic and atraumatic.   Eyes:      Conjunctiva/sclera: Conjunctivae normal.   Abdominal:      General: Abdomen is flat. There is no distension.      Palpations: There is no mass.      Tenderness: There is no abdominal tenderness. There is no guarding or rebound.      Hernia: No hernia is present.   Musculoskeletal:      Cervical back: Normal range of motion and neck supple.   Neurological:      Mental Status: He is alert.         Results Review:   I reviewed the patient's new clinical results.    No visits with results within 90 Day(s) from this visit.   Latest known visit with results is:   Office Visit on 08/25/2023   Component Date Value Ref Range Status    SARS Antigen 08/25/2023 Detected (A)  Not Detected, Presumptive Negative Final    Influenza A Antigen RORY 08/25/2023 Not Detected  Not Detected Final    Influenza B Antigen RORY 08/25/2023 Not Detected  Not Detected Final    Internal Control 08/25/2023 Passed  " Passed Final    Lot Number 08/25/2023 [id number]   Final    Expiration Date 08/25/2023 04/10/24   Final      No radiology results for the last 90 days.      EGD on 10/07/2022  - Normal oropharynx.  - Z-line regular, 38 cm from the incisors.  - Small hiatal hernia  - No gross lesions in the entire esophagus. Biopsied for surveillance. No endoscopic signs of EoE identified this time  - Erosive gastropathy with stigmata of recent bleeding. Copuld NSAID related  - Erythematous mucosa in the posterior wall of the stomach, antrum and prepyloric region of the stomach. Biopsied.  - Normal duodenal bulb, first portion of the duodenum, second portion of the duodenum and third portion of the Duodenum.     Esophageal biopsy normal.  Gastric biopsy mild chronic gastritis H. pylori negative     Colonoscopy 10/7/2022  - One 2 to 3 mm polyp at the recto-sigmoid colon, removed with a cold snare. Resected and retrieved. Clinically hyperplastic.  - Small developing Diverticulosis in the sigmoid colon.  - Non-bleeding external hemorrhoids.  - the examined portion of the ileum was normal     Rectosigmoid polyp hyperplastic.     Assessment / Plan      1.  History of PPI responsive eosinophilic esophagitis  2.  History of esophageal stricture   3.  History of Schatzki's ring GE junction  4.  Gastroesophageal reflux disease without esophagitis  1/8/2024  Patient is clinically doing well without any reflux symptoms or dysphagia.  Lab work done in July 2023 reviewed and reveals a normal CMP.  Hemoglobin A1c is 5.10 TSH 1.2.  Vitamin B12 303.  Hemoglobin was 17.8 with a normal WBC and platelets. EGD on 10/7/2022 revealed a small hiatal hernia.  Significant esophageal ring or obstruction.  Minimal erosive gastropathy and biopsies consistent with chronic gastritis.  H. pylori negative.  Esophageal biopsies were normal without any eosinophils.     His hemoglobin is high with mild lymphocytosis.  This could be associated with the some degree of  hemoconcentration during blood work done.  However will repeat the lab work and confirm normalization of the hemoglobin.    Continue low-dose PPI Protonix 20 mg p.o. daily  Follow-up in 1 year    12/10/2020  His EGD done on 10/30/2020 again revealed signs of EOE with a ERESS  score of 2.   His esophageal biopsies at this time showed a chronic inflammation and reactive changes with the eosinophils 10 per high-power field.  His pathology findings significant improved from prior finding of 38 eosinophils per high-power field. He had an EGD done on 6/15/2020.  EGD revealed a mild upper esophageal stricture.  He had a esophageal savory dilatation performed and had multiple biopsies obtained to rule out EOE.  His biopsies revealed a 38 eosinophils per high-power field suggesting eosinophilic esophagitis.  He did not have any classical signs of EOE and endoscopy however he did have a mild upper esophageal narrowing.  EGD also showed small hiatal hernia.  Gastric biopsies were negative for H. pylori.  Lab reports and pathology reports have been discussed with the patient.  His food allergy profile came out positive for allergy to cows milk, corn, sesame seed. I have advised to avoid about.  We will treat him with high-dose PPI for 8 weeks to rule out PPI responsive EOE     Prior history  5. Personal history of colonic polyps  6. Family hx of colon cancer  1/9/2023  Colonoscopy done on 10/7/2022 revealed a small rectosigmoid polyp which was hyperplastic, reticulosis otherwise unremarkable.  Due to family history of colon cancer with her mother at age of 60 he needs a screening colonoscopy in 5 years time in October 2027       Follow Up:   No follow-ups on file.    Manuel Ann MD  Gastroenterology [facility]  1/8/2024  16:20 EST     Please note that portions of this note may have been completed with a voice recognition program.

## 2024-07-15 ENCOUNTER — OFFICE VISIT (OUTPATIENT)
Dept: INTERNAL MEDICINE | Facility: CLINIC | Age: 54
End: 2024-07-15
Payer: COMMERCIAL

## 2024-07-15 VITALS
SYSTOLIC BLOOD PRESSURE: 139 MMHG | DIASTOLIC BLOOD PRESSURE: 85 MMHG | WEIGHT: 138 LBS | HEIGHT: 66 IN | BODY MASS INDEX: 22.18 KG/M2 | HEART RATE: 63 BPM | TEMPERATURE: 97.7 F | OXYGEN SATURATION: 98 % | RESPIRATION RATE: 18 BRPM

## 2024-07-15 DIAGNOSIS — Z00.00 ROUTINE GENERAL MEDICAL EXAMINATION AT A HEALTH CARE FACILITY: Primary | ICD-10-CM

## 2024-07-15 PROCEDURE — 99396 PREV VISIT EST AGE 40-64: CPT | Performed by: INTERNAL MEDICINE

## 2024-07-15 NOTE — PROGRESS NOTES
Subjective   Phani Paulson is a 54 y.o. male.     Chief Complaint   Patient presents with    Annual Exam       History of Present Illness   Patient is here for a physical and due for lab work    Review of Systems   All other systems reviewed and are negative.      Past Medical History:   Diagnosis Date    Acute suppurative otitis media of both ears without spontaneous rupture of tympanic membranes     Chicken pox     Esophageal stricture     GERD (gastroesophageal reflux disease)     Lesion of skin of face     benign    Seasonal allergies     Wears glasses        Past Surgical History:   Procedure Laterality Date    COLONOSCOPY      4yrs     COLONOSCOPY N/A 10/7/2022    Procedure: COLONOSCOPY WITH POLYPECTOMY X1;  Surgeon: Manuel Ann MD;  Location: [facility] ENDOSCOPY;  Service: Gastroenterology;  Laterality: N/A;    ENDOSCOPY N/A 6/15/2020    Procedure: ESOPHAGOGASTRODUODENOSCOPY WITH BIOPSY AND DILATATION;  Surgeon: Manuel Ann MD;  Location: [facility] ENDOSCOPY;  Service: Gastroenterology;  Laterality: N/A;    ENDOSCOPY N/A 8/21/2020    Procedure: ESOPHAGOGASTRODUODENOSCOPY WITH BIOPSY;  Surgeon: Manuel Ann MD;  Location: [facility] ENDOSCOPY;  Service: Gastroenterology;  Laterality: N/A;    ENDOSCOPY N/A 10/30/2020    Procedure: ESOPHAGOGASTRODUODENOSCOPY WITH COLD FORCEP BIOPSY;  Surgeon: Manuel Ann MD;  Location: [facility] ENDOSCOPY;  Service: Gastroenterology;  Laterality: N/A;    ENDOSCOPY N/A 10/7/2022    Procedure: ESOPHAGOGASTRODUODENOSCOPY WITH BIOPSY;  Surgeon: Manuel Ann MD;  Location: [facility] ENDOSCOPY;  Service: Gastroenterology;  Laterality: N/A;    SKIN BIOPSY      face-benign       Family History   Problem Relation Age of Onset    Colon cancer Mother     Hypertension Mother     Skin cancer Father     Cirrhosis Neg Hx     Liver cancer Neg Hx     Liver disease Neg Hx         reports that he has never smoked. He has never been exposed to tobacco  "smoke. He has never used smokeless tobacco. He reports that he does not drink alcohol and does not use drugs.    No Known Allergies        Current Outpatient Medications:     Loratadine 10 MG capsule, Take 1 capsule by mouth Daily., Disp: , Rfl:     pantoprazole (PROTONIX) 20 MG EC tablet, TAKE 1 TABLET DAILY, Disp: 90 tablet, Rfl: 3    valACYclovir (VALTREX) 500 MG tablet, Take 1 tablet by mouth 2 (Two) Times a Day., Disp: , Rfl:       Objective   Blood pressure 139/85, pulse 63, temperature 97.7 °F (36.5 °C), resp. rate 18, height 167.6 cm (65.98\"), weight 62.6 kg (138 lb), SpO2 98%.    Physical Exam  Vitals and nursing note reviewed.   Constitutional:       General: He is not in acute distress.     Appearance: Normal appearance. He is not diaphoretic.   HENT:      Head: Normocephalic and atraumatic.      Right Ear: External ear normal.      Left Ear: External ear normal.      Nose: Nose normal.   Eyes:      Extraocular Movements: Extraocular movements intact.      Conjunctiva/sclera: Conjunctivae normal.   Neck:      Trachea: Trachea normal.   Cardiovascular:      Rate and Rhythm: Normal rate and regular rhythm.      Heart sounds: Normal heart sounds.   Pulmonary:      Effort: Pulmonary effort is normal. No respiratory distress.   Abdominal:      General: Abdomen is flat.   Musculoskeletal:      Cervical back: Neck supple.      Comments: Moves all limbs   Skin:     General: Skin is warm and dry.      Findings: No erythema.   Neurological:      Mental Status: He is alert and oriented to person, place, and time.      Comments: No gross motor or sensory deficits         BMI is within normal parameters. No other follow-up for BMI required.      Results for orders placed or performed in visit on 01/08/24   CBC Auto Differential    Specimen: Blood   Result Value Ref Range    WBC 8.29 3.40 - 10.80 10*3/mm3    RBC 5.55 4.14 - 5.80 10*6/mm3    Hemoglobin 16.6 13.0 - 17.7 g/dL    Hematocrit 47.7 37.5 - 51.0 %    MCV 85.9 " 79.0 - 97.0 fL    MCH 29.9 26.6 - 33.0 pg    MCHC 34.8 31.5 - 35.7 g/dL    RDW 12.5 12.3 - 15.4 %    RDW-SD 39.0 37.0 - 54.0 fl    MPV 9.3 6.0 - 12.0 fL    Platelets 301 140 - 450 10*3/mm3    Neutrophil % 63.2 42.7 - 76.0 %    Lymphocyte % 21.2 19.6 - 45.3 %    Monocyte % 11.3 5.0 - 12.0 %    Eosinophil % 2.7 0.3 - 6.2 %    Basophil % 1.4 0.0 - 1.5 %    Immature Grans % 0.2 0.0 - 0.5 %    Neutrophils, Absolute 5.23 1.70 - 7.00 10*3/mm3    Lymphocytes, Absolute 1.76 0.70 - 3.10 10*3/mm3    Monocytes, Absolute 0.94 (H) 0.10 - 0.90 10*3/mm3    Eosinophils, Absolute 0.22 0.00 - 0.40 10*3/mm3    Basophils, Absolute 0.12 0.00 - 0.20 10*3/mm3    Immature Grans, Absolute 0.02 0.00 - 0.05 10*3/mm3    nRBC 0.0 0.0 - 0.2 /100 WBC         Assessment & Plan   Diagnoses and all orders for this visit:    1. Routine general medical examination at a health care facility (Primary)  -     CBC (No Diff)  -     Comprehensive Metabolic Panel  -     Lipid Panel  -     Hemoglobin A1c  -     TSH  -     Vitamin B12      plan:  1.physical: Physical exam conducted today,  Will obtain fasting lab work,  last colonoscopy 2022  Impression: healthy adult Patient. Currently, patient eats a healthy diet. Screening lab work includes glucose, lipid profile , complete blood count and comprehensive panel . The risks and benefits of immunizations were discussed and immunizations are up to date. Advice and education were given regarding nutrition and aerobic exercise. Patient discussion: discussed with the patient.  Physical with preventive exam as well as age and risk appropriate counseling completed.   Patient Discussion: plan discussed with the patient, follow-up visit needed in one year. Medication Review and medication list updated  Advised  regular weight-bearing exercise             Annabelle Jarvis MD

## 2024-07-16 LAB
ALBUMIN SERPL-MCNC: 4.6 G/DL (ref 3.5–5.2)
ALBUMIN/GLOB SERPL: 1.8 G/DL
ALP SERPL-CCNC: 83 U/L (ref 39–117)
ALT SERPL-CCNC: 13 U/L (ref 1–41)
AST SERPL-CCNC: 16 U/L (ref 1–40)
BILIRUB SERPL-MCNC: 0.6 MG/DL (ref 0–1.2)
BUN SERPL-MCNC: 11 MG/DL (ref 6–20)
BUN/CREAT SERPL: 11.6 (ref 7–25)
CALCIUM SERPL-MCNC: 9.6 MG/DL (ref 8.6–10.5)
CHLORIDE SERPL-SCNC: 104 MMOL/L (ref 98–107)
CHOLEST SERPL-MCNC: 194 MG/DL (ref 0–200)
CO2 SERPL-SCNC: 25.2 MMOL/L (ref 22–29)
CREAT SERPL-MCNC: 0.95 MG/DL (ref 0.76–1.27)
EGFRCR SERPLBLD CKD-EPI 2021: 96.3 ML/MIN/1.73
ERYTHROCYTE [DISTWIDTH] IN BLOOD BY AUTOMATED COUNT: 12 % (ref 12.3–15.4)
GLOBULIN SER CALC-MCNC: 2.6 GM/DL
GLUCOSE SERPL-MCNC: 95 MG/DL (ref 65–99)
HBA1C MFR BLD: 5.2 % (ref 4.8–5.6)
HCT VFR BLD AUTO: 52.5 % (ref 37.5–51)
HDLC SERPL-MCNC: 51 MG/DL (ref 40–60)
HGB BLD-MCNC: 17.9 G/DL (ref 13–17.7)
LDLC SERPL CALC-MCNC: 130 MG/DL (ref 0–100)
MCH RBC QN AUTO: 29.8 PG (ref 26.6–33)
MCHC RBC AUTO-ENTMCNC: 34.1 G/DL (ref 31.5–35.7)
MCV RBC AUTO: 87.5 FL (ref 79–97)
PLATELET # BLD AUTO: 351 10*3/MM3 (ref 140–450)
POTASSIUM SERPL-SCNC: 4.7 MMOL/L (ref 3.5–5.2)
PROT SERPL-MCNC: 7.2 G/DL (ref 6–8.5)
RBC # BLD AUTO: 6 10*6/MM3 (ref 4.14–5.8)
SODIUM SERPL-SCNC: 139 MMOL/L (ref 136–145)
TRIGL SERPL-MCNC: 73 MG/DL (ref 0–150)
TSH SERPL DL<=0.005 MIU/L-ACNC: 1.02 UIU/ML (ref 0.27–4.2)
VIT B12 SERPL-MCNC: 461 PG/ML (ref 211–946)
VLDLC SERPL CALC-MCNC: 13 MG/DL (ref 5–40)
WBC # BLD AUTO: 6.72 10*3/MM3 (ref 3.4–10.8)

## 2024-08-05 ENCOUNTER — HOSPITAL ENCOUNTER (EMERGENCY)
Facility: HOSPITAL | Age: 54
Discharge: HOME | End: 2024-08-05
Attending: EMERGENCY MEDICINE | Admitting: EMERGENCY MEDICINE
Payer: COMMERCIAL

## 2024-08-05 ENCOUNTER — APPOINTMENT (OUTPATIENT)
Dept: CT IMAGING | Facility: HOSPITAL | Age: 54
End: 2024-08-05
Payer: COMMERCIAL

## 2024-08-05 VITALS
SYSTOLIC BLOOD PRESSURE: 129 MMHG | HEART RATE: 83 BPM | TEMPERATURE: 97.4 F | OXYGEN SATURATION: 97 % | WEIGHT: 135 LBS | BODY MASS INDEX: 21.69 KG/M2 | HEIGHT: 66 IN | DIASTOLIC BLOOD PRESSURE: 79 MMHG | RESPIRATION RATE: 18 BRPM

## 2024-08-05 DIAGNOSIS — N20.1 URETEROLITHIASIS: Primary | ICD-10-CM

## 2024-08-05 DIAGNOSIS — N13.30 HYDROURETERONEPHROSIS: ICD-10-CM

## 2024-08-05 DIAGNOSIS — N20.0 RENAL STONE: ICD-10-CM

## 2024-08-05 LAB
ALBUMIN SERPL-MCNC: 4.7 G/DL (ref 3.5–5.2)
ALBUMIN/GLOB SERPL: 1.5 G/DL
ALP SERPL-CCNC: 89 U/L (ref 39–117)
ALT SERPL W P-5'-P-CCNC: 17 U/L (ref 1–41)
ANION GAP SERPL CALCULATED.3IONS-SCNC: 10 MMOL/L (ref 5–15)
AST SERPL-CCNC: 19 U/L (ref 1–40)
BACTERIA UR QL AUTO: ABNORMAL /HPF
BASOPHILS # BLD AUTO: 0.11 10*3/MM3 (ref 0–0.2)
BASOPHILS NFR BLD AUTO: 1.3 % (ref 0–1.5)
BILIRUB SERPL-MCNC: 0.5 MG/DL (ref 0–1.2)
BILIRUB UR QL STRIP: NEGATIVE
BUN SERPL-MCNC: 12 MG/DL (ref 6–20)
BUN/CREAT SERPL: 10.1 (ref 7–25)
CALCIUM SPEC-SCNC: 9.3 MG/DL (ref 8.6–10.5)
CHLORIDE SERPL-SCNC: 105 MMOL/L (ref 98–107)
CLARITY UR: CLEAR
CO2 SERPL-SCNC: 26 MMOL/L (ref 22–29)
COLOR UR: ABNORMAL
CREAT SERPL-MCNC: 1.19 MG/DL (ref 0.76–1.27)
DEPRECATED RDW RBC AUTO: 40.5 FL (ref 37–54)
EGFRCR SERPLBLD CKD-EPI 2021: 73.5 ML/MIN/1.73
EOSINOPHIL # BLD AUTO: 0.42 10*3/MM3 (ref 0–0.4)
EOSINOPHIL NFR BLD AUTO: 5.1 % (ref 0.3–6.2)
ERYTHROCYTE [DISTWIDTH] IN BLOOD BY AUTOMATED COUNT: 12.8 % (ref 12.3–15.4)
GLOBULIN UR ELPH-MCNC: 3.2 GM/DL
GLUCOSE SERPL-MCNC: 105 MG/DL (ref 65–99)
GLUCOSE UR STRIP-MCNC: NEGATIVE MG/DL
HCT VFR BLD AUTO: 49.8 % (ref 37.5–51)
HGB BLD-MCNC: 17.2 G/DL (ref 13–17.7)
HGB UR QL STRIP.AUTO: ABNORMAL
HYALINE CASTS UR QL AUTO: ABNORMAL /LPF
IMM GRANULOCYTES # BLD AUTO: 0.02 10*3/MM3 (ref 0–0.05)
IMM GRANULOCYTES NFR BLD AUTO: 0.2 % (ref 0–0.5)
KETONES UR QL STRIP: ABNORMAL
LEUKOCYTE ESTERASE UR QL STRIP.AUTO: NEGATIVE
LYMPHOCYTES # BLD AUTO: 1.89 10*3/MM3 (ref 0.7–3.1)
LYMPHOCYTES NFR BLD AUTO: 22.8 % (ref 19.6–45.3)
MCH RBC QN AUTO: 30 PG (ref 26.6–33)
MCHC RBC AUTO-ENTMCNC: 34.5 G/DL (ref 31.5–35.7)
MCV RBC AUTO: 86.8 FL (ref 79–97)
MONOCYTES # BLD AUTO: 0.84 10*3/MM3 (ref 0.1–0.9)
MONOCYTES NFR BLD AUTO: 10.1 % (ref 5–12)
NEUTROPHILS NFR BLD AUTO: 5 10*3/MM3 (ref 1.7–7)
NEUTROPHILS NFR BLD AUTO: 60.5 % (ref 42.7–76)
NITRITE UR QL STRIP: NEGATIVE
NRBC BLD AUTO-RTO: 0 /100 WBC (ref 0–0.2)
PH UR STRIP.AUTO: 7 [PH] (ref 5–8)
PLATELET # BLD AUTO: 272 10*3/MM3 (ref 140–450)
PMV BLD AUTO: 9.2 FL (ref 6–12)
POTASSIUM SERPL-SCNC: 4.5 MMOL/L (ref 3.5–5.2)
PROT SERPL-MCNC: 7.9 G/DL (ref 6–8.5)
PROT UR QL STRIP: ABNORMAL
RBC # BLD AUTO: 5.74 10*6/MM3 (ref 4.14–5.8)
RBC # UR STRIP: ABNORMAL /HPF
REF LAB TEST METHOD: ABNORMAL
SODIUM SERPL-SCNC: 141 MMOL/L (ref 136–145)
SP GR UR STRIP: >=1.03 (ref 1–1.03)
SQUAMOUS #/AREA URNS HPF: ABNORMAL /HPF
UROBILINOGEN UR QL STRIP: ABNORMAL
WBC # UR STRIP: ABNORMAL /HPF
WBC NRBC COR # BLD AUTO: 8.28 10*3/MM3 (ref 3.4–10.8)

## 2024-08-05 PROCEDURE — 80053 COMPREHEN METABOLIC PANEL: CPT

## 2024-08-05 PROCEDURE — 85025 COMPLETE CBC W/AUTO DIFF WBC: CPT

## 2024-08-05 PROCEDURE — 74176 CT ABD & PELVIS W/O CONTRAST: CPT

## 2024-08-05 PROCEDURE — 81001 URINALYSIS AUTO W/SCOPE: CPT

## 2024-08-05 PROCEDURE — 25010000002 ONDANSETRON PER 1 MG

## 2024-08-05 PROCEDURE — 96374 THER/PROPH/DIAG INJ IV PUSH: CPT

## 2024-08-05 PROCEDURE — 96375 TX/PRO/DX INJ NEW DRUG ADDON: CPT

## 2024-08-05 PROCEDURE — 25810000003 SODIUM CHLORIDE 0.9 % SOLUTION 250 ML FLEX CONT

## 2024-08-05 PROCEDURE — 25810000003 SODIUM CHLORIDE 0.9 % SOLUTION

## 2024-08-05 PROCEDURE — 96361 HYDRATE IV INFUSION ADD-ON: CPT

## 2024-08-05 PROCEDURE — 25010000002 MORPHINE PER 10 MG: Performed by: EMERGENCY MEDICINE

## 2024-08-05 PROCEDURE — 99284 EMERGENCY DEPT VISIT MOD MDM: CPT

## 2024-08-05 PROCEDURE — 25010000002 KETOROLAC TROMETHAMINE PER 15 MG

## 2024-08-05 RX ORDER — KETOROLAC TROMETHAMINE 30 MG/ML
15 INJECTION, SOLUTION INTRAMUSCULAR; INTRAVENOUS ONCE
Status: COMPLETED | OUTPATIENT
Start: 2024-08-05 | End: 2024-08-05

## 2024-08-05 RX ORDER — TAMSULOSIN HYDROCHLORIDE 0.4 MG/1
1 CAPSULE ORAL DAILY
Qty: 14 CAPSULE | Refills: 0 | Status: SHIPPED | OUTPATIENT
Start: 2024-08-05 | End: 2024-08-06

## 2024-08-05 RX ORDER — OXYCODONE HYDROCHLORIDE AND ACETAMINOPHEN 5; 325 MG/1; MG/1
1 TABLET ORAL ONCE
Status: COMPLETED | OUTPATIENT
Start: 2024-08-05 | End: 2024-08-05

## 2024-08-05 RX ORDER — OXYCODONE HYDROCHLORIDE 5 MG/1
5 TABLET ORAL EVERY 4 HOURS PRN
Qty: 12 TABLET | Refills: 0 | Status: SHIPPED | OUTPATIENT
Start: 2024-08-05 | End: 2024-08-05

## 2024-08-05 RX ORDER — ONDANSETRON 2 MG/ML
4 INJECTION INTRAMUSCULAR; INTRAVENOUS ONCE
Status: COMPLETED | OUTPATIENT
Start: 2024-08-05 | End: 2024-08-05

## 2024-08-05 RX ORDER — ONDANSETRON 4 MG/1
4 TABLET, ORALLY DISINTEGRATING ORAL EVERY 8 HOURS PRN
Qty: 12 TABLET | Refills: 0 | Status: SHIPPED | OUTPATIENT
Start: 2024-08-05 | End: 2024-08-06

## 2024-08-05 RX ORDER — OXYCODONE HYDROCHLORIDE 5 MG/1
5 TABLET ORAL EVERY 4 HOURS PRN
Qty: 12 TABLET | Refills: 0 | Status: SHIPPED | OUTPATIENT
Start: 2024-08-05

## 2024-08-05 RX ADMIN — MORPHINE SULFATE 4 MG: 4 INJECTION, SOLUTION INTRAMUSCULAR; INTRAVENOUS at 18:29

## 2024-08-05 RX ADMIN — LIDOCAINE HYDROCHLORIDE 100 MG: 10 INJECTION, SOLUTION EPIDURAL; INFILTRATION; INTRACAUDAL; PERINEURAL at 18:00

## 2024-08-05 RX ADMIN — ONDANSETRON 4 MG: 2 INJECTION INTRAMUSCULAR; INTRAVENOUS at 17:59

## 2024-08-05 RX ADMIN — OXYCODONE HYDROCHLORIDE AND ACETAMINOPHEN 1 TABLET: 5; 325 TABLET ORAL at 19:31

## 2024-08-05 RX ADMIN — SODIUM CHLORIDE 1000 ML: 9 INJECTION, SOLUTION INTRAVENOUS at 17:57

## 2024-08-05 RX ADMIN — KETOROLAC TROMETHAMINE 15 MG: 30 INJECTION, SOLUTION INTRAMUSCULAR; INTRAVENOUS at 17:59

## 2024-08-05 NOTE — Clinical Note
[facility] EMERGENCY DEPARTMENT  [address] [address]  Phone: [phone]    Phani Paulson was seen and treated in our emergency department on 8/5/2024.  He may return to work on 08/09/2024.         Thank you for choosing [facility].    Adeel Ramirez PA-C

## 2024-08-05 NOTE — ED PROVIDER NOTES
Subjective  History of Present Illness:  This is a 54-year-old male presented for evaluation of left flank pain approximately 30 minutes prior to arrival reports sudden onset left flank pain while sitting in the car.  He got home, had some difficulty urinating, no dysuria hematuria urgency or frequency.  He had an episode of nausea and vomiting at home as well.  He appears uncomfortable on arrival.  No prior history of nephrolithiasis.  No abdominal pain.  No chest pain no dyspnea.  No fevers.  No trauma no injuries, better with standing, not made worse with movement.  No saddle anesthesias, no urinary fecal incontinence, no urinary retention      Nurses Notes reviewed and agree, including vitals, allergies, social history and prior medical history.     REVIEW OF SYSTEMS: All systems reviewed and not pertinent unless noted.  Review of Systems   Constitutional:  Negative for fever.   Gastrointestinal:  Positive for nausea and vomiting. Negative for abdominal pain.   Genitourinary:  Positive for difficulty urinating and flank pain. Negative for dysuria, frequency, hematuria, scrotal swelling, testicular pain and urgency.   Musculoskeletal:  Positive for back pain.   All other systems reviewed and are negative.      Past Medical History:   Diagnosis Date    Acute suppurative otitis media of both ears without spontaneous rupture of tympanic membranes     Chicken pox     Esophageal stricture     GERD (gastroesophageal reflux disease)     Lesion of skin of face     benign    Seasonal allergies     Wears glasses        Allergies:    Patient has no known allergies.      Past Surgical History:   Procedure Laterality Date    COLONOSCOPY      6yrs     COLONOSCOPY N/A 10/7/2022    Procedure: COLONOSCOPY WITH POLYPECTOMY X1;  Surgeon: Manuel Ann MD;  Location: [facility] ENDOSCOPY;  Service: Gastroenterology;  Laterality: N/A;    ENDOSCOPY N/A 6/15/2020    Procedure: ESOPHAGOGASTRODUODENOSCOPY WITH BIOPSY AND DILATATION;  " Surgeon: Manuel Ann MD;  Location: [facility] ENDOSCOPY;  Service: Gastroenterology;  Laterality: N/A;    ENDOSCOPY N/A 8/21/2020    Procedure: ESOPHAGOGASTRODUODENOSCOPY WITH BIOPSY;  Surgeon: Manuel Ann MD;  Location: [facility] ENDOSCOPY;  Service: Gastroenterology;  Laterality: N/A;    ENDOSCOPY N/A 10/30/2020    Procedure: ESOPHAGOGASTRODUODENOSCOPY WITH COLD FORCEP BIOPSY;  Surgeon: Manuel Ann MD;  Location: [facility] ENDOSCOPY;  Service: Gastroenterology;  Laterality: N/A;    ENDOSCOPY N/A 10/7/2022    Procedure: ESOPHAGOGASTRODUODENOSCOPY WITH BIOPSY;  Surgeon: Manuel Ann MD;  Location: [facility] ENDOSCOPY;  Service: Gastroenterology;  Laterality: N/A;    SKIN BIOPSY      face-benign         Social History     Socioeconomic History    Marital status:    Tobacco Use    Smoking status: Never     Passive exposure: Never    Smokeless tobacco: Never   Vaping Use    Vaping status: Never Used   Substance and Sexual Activity    Alcohol use: No    Drug use: Never    Sexual activity: Defer         Family History   Problem Relation Age of Onset    Colon cancer Mother     Hypertension Mother     Skin cancer Father     Cirrhosis Neg Hx     Liver cancer Neg Hx     Liver disease Neg Hx        Objective  Physical Exam:  /76   Pulse 76   Temp 97.4 °F (36.3 °C)   Resp 18   Ht 167.6 cm (66\")   Wt 61.2 kg (135 lb)   SpO2 98%   BMI 21.79 kg/m²      Physical Exam  Vitals and nursing note reviewed.   Constitutional:       General: He is not in acute distress.     Appearance: Normal appearance. He is normal weight. He is not ill-appearing, toxic-appearing or diaphoretic.   HENT:      Head: Normocephalic and atraumatic.      Nose: Nose normal.      Mouth/Throat:      Mouth: Mucous membranes are moist.      Pharynx: Oropharynx is clear.   Eyes:      Extraocular Movements: Extraocular movements intact.   Cardiovascular:      Pulses: Normal pulses.      Comments: Appears " well-perfused  Pulmonary:      Effort: Pulmonary effort is normal. No respiratory distress.   Abdominal:      General: There is no distension.      Palpations: Abdomen is soft.      Tenderness: There is no abdominal tenderness. There is left CVA tenderness. There is no right CVA tenderness or guarding.   Musculoskeletal:         General: Normal range of motion.      Cervical back: Normal range of motion.   Skin:     General: Skin is warm and dry.      Capillary Refill: Capillary refill takes less than 2 seconds.   Neurological:      General: No focal deficit present.      Mental Status: He is alert and oriented to person, place, and time.   Psychiatric:         Mood and Affect: Mood normal.         Behavior: Behavior normal.         Thought Content: Thought content normal.         Judgment: Judgment normal.             Procedures    ED Course:         Lab Results (last 24 hours)       Procedure Component Value Units Date/Time    CBC Auto Differential [[id number]]  (Abnormal) Collected: 08/05/24 1749    Specimen: Blood Updated: 08/05/24 1802     WBC 8.28 10*3/mm3      RBC 5.74 10*6/mm3      Hemoglobin 17.2 g/dL      Hematocrit 49.8 %      MCV 86.8 fL      MCH 30.0 pg      MCHC 34.5 g/dL      RDW 12.8 %      RDW-SD 40.5 fl      MPV 9.2 fL      Platelets 272 10*3/mm3      Neutrophil % 60.5 %      Lymphocyte % 22.8 %      Monocyte % 10.1 %      Eosinophil % 5.1 %      Basophil % 1.3 %      Immature Grans % 0.2 %      Neutrophils, Absolute 5.00 10*3/mm3      Lymphocytes, Absolute 1.89 10*3/mm3      Monocytes, Absolute 0.84 10*3/mm3      Eosinophils, Absolute 0.42 10*3/mm3      Basophils, Absolute 0.11 10*3/mm3      Immature Grans, Absolute 0.02 10*3/mm3      nRBC 0.0 /100 WBC     Comprehensive Metabolic Panel [[id number]]  (Abnormal) Collected: 08/05/24 1749    Specimen: Blood Updated: 08/05/24 1819     Glucose 105 mg/dL      BUN 12 mg/dL      Creatinine 1.19 mg/dL      Sodium 141 mmol/L      Potassium 4.5 mmol/L       Chloride 105 mmol/L      CO2 26.0 mmol/L      Calcium 9.3 mg/dL      Total Protein 7.9 g/dL      Albumin 4.7 g/dL      ALT (SGPT) 17 U/L      AST (SGOT) 19 U/L      Alkaline Phosphatase 89 U/L      Total Bilirubin 0.5 mg/dL      Globulin 3.2 gm/dL      A/G Ratio 1.5 g/dL      BUN/Creatinine Ratio 10.1     Anion Gap 10.0 mmol/L      eGFR 73.5 mL/min/1.73     Narrative:      GFR Normal >60  Chronic Kidney Disease <60  Kidney Failure <15      Urinalysis With Culture If Indicated - Urine, Clean Catch [[id number]]  (Abnormal) Collected: 08/05/24 1844    Specimen: Urine, Clean Catch Updated: 08/05/24 1858     Color, UA Dark Yellow     Appearance, UA Clear     pH, UA 7.0     Specific Gravity, UA >=1.030     Glucose, UA Negative     Ketones, UA Trace     Bilirubin, UA Negative     Blood, UA Moderate (2+)     Protein, UA Trace     Leuk Esterase, UA Negative     Nitrite, UA Negative     Urobilinogen, UA 1.0 E.U./dL    Narrative:      In absence of clinical symptoms, the presence of pyuria, bacteria, and/or nitrites on the urinalysis result does not correlate with infection.    Urinalysis, Microscopic Only - Urine, Clean Catch [[id number]]  (Abnormal) Collected: 08/05/24 1844    Specimen: Urine, Clean Catch Updated: 08/05/24 1914     RBC, UA 3-5 /HPF      WBC, UA 0-2 /HPF      Comment: Urine culture not indicated.        Bacteria, UA None Seen /HPF      Squamous Epithelial Cells, UA 7-12 /HPF      Hyaline Casts, UA None Seen /LPF      Methodology Manual Light Microscopy             CT Abdomen Pelvis Stone Protocol    Result Date: 8/5/2024  FINAL REPORT TECHNIQUE: Routine axial images through the abdomen and pelvis were obtained. This study was performed with techniques to keep radiation doses as low as reasonably achievable (ALARA). Individualized dose reduction techniques using automated exposure control or adjustment of mA and/or kV according to the patient's size were employed. CLINICAL HISTORY: left flank pain, n/v rule out  stone FINDINGS: Abdomen:  The gallbladder is normal.  There is a 4 mm nonobstructing left renal calculus and there is mild left hydroureteronephrosis secondary to a 2 mm calculus at the UVJ. High density within the left renal collecting system and ureter is consistent with hematuria.  The other solid abdominal organs and right ureter are unremarkable.  The GI tract is unremarkable, including the appendix.  Pelvis: The contents of the urinary bladder are somewhat hyperdense also consistent with hemorrhage.  The urinary bladder was unremarkable.  There is no pelvic or abdominal ascites, adenopathy or acute osseous abnormality.     Impression: Mild left hydroureteronephrosis and hematuria secondary to a 2 mm calculus at the UVJ.  Nonobstructing left renal calculus. Authenticated and Electronically Signed by Jose Calero M.D. on 08/05/2024 07:11:51 PM        MDM      Initial impression of presenting illness: This is a 54-year-old male presented for evaluation of left-sided flank pain sudden onset approximate 30 minutes prior to arrival.  Associated nausea vomiting difficulty urinating at home    DDX: includes but is not limited to: Nephrolithiasis, ureteric stone, UPJ obstruction, pyelonephritis, urinary tract infection, obstructing stone, musculoskeletal strain or sprain, others    Patient arrives hemodynamically stable afebrile nontachycardic nontachypneic nonhypoxic with vitals interpreted by myself.     Pertinent features from physical exam: Patient appears uncomfortable, left CVA tenderness palpated, abdomen soft nontender nonreactive, no cervical thoracic or lumbar spine tenderness palpated in the midline.    Initial diagnostic plan: CBC CMP urinalysis CT abdomen pelvis stone protocol    Results from initial plan were reviewed and interpreted by me revealing CMP normal renal functions, CBC is unremarkable, urinalysis moderate blood, dark yellow appearance, nitrite and leuk esterase negative, CT abdomen pelvis per  radiology mild left hydroureteronephrosis and hematuria secondary to a 2 mm calculus at the UVJ.  Urinalysis with 3-5 red blood cell 7-12 squamous cells, not consistent with infective process.    Diagnostic information from other sources: Record reviewed    Interventions / Re-evaluation: Toradol, lidocaine infusion for pain, 1 L fluids and Zofran.  Morphine for further pain control, patient feeling significantly better.  Stable for discharge    Results/clinical rationale were discussed with patient at bedside    Consultations/Discussion of results with other physicians: N/A    Disposition plan: Discharge, urology follow-up.  Return precautions given.  He was prescribed Flomax, Zofran, Roxicodone for pain control.  Recommended that he could take Tylenol and Motrin as well as needed but no more than 3000 mg combined of Tylenol per day, recommended oral fluid hydration to help flush the stone, he was agreeable to this plan at bedside.  -----    Final diagnoses:   Ureterolithiasis   Renal stone   Hydroureteronephrosis          Adeel Ramriez PA-C  08/05/24 1933

## 2024-08-05 NOTE — Clinical Note
[facility] EMERGENCY DEPARTMENT  [address] [address]  Phone: [phone]    Phani Paulson was seen and treated in our emergency department on 8/5/2024.  He may return to work on 08/09/2024.         Thank you for choosing [facility].    Adeel Ramirez PA-C

## 2024-08-05 NOTE — DISCHARGE INSTRUCTIONS
Follow-up with urology, their numbers been attached, call to schedule appointment, medications to your pharmacy, take as directed, do not operate or drive machinery when you have recently taken the oxycodone.  You can additionally use Tylenol and Motrin as needed.  No more than 3000 mg total of Tylenol in a day, drink plenty of fluids to help flush the stone.

## 2024-08-06 ENCOUNTER — TELEPHONE (OUTPATIENT)
Dept: UROLOGY | Facility: CLINIC | Age: 54
End: 2024-08-06

## 2024-08-06 RX ORDER — TAMSULOSIN HYDROCHLORIDE 0.4 MG/1
1 CAPSULE ORAL DAILY
Qty: 14 CAPSULE | Refills: 0 | Status: SHIPPED | OUTPATIENT
Start: 2024-08-06 | End: 2024-08-20

## 2024-08-06 RX ORDER — ONDANSETRON 4 MG/1
4 TABLET, ORALLY DISINTEGRATING ORAL EVERY 8 HOURS PRN
Qty: 12 TABLET | Refills: 0 | Status: SHIPPED | OUTPATIENT
Start: 2024-08-06

## 2024-08-06 NOTE — TELEPHONE ENCOUNTER
.  Caller: MASON WOODS    Relationship: SELF    Best call back number: [phone]    Which medication are you concerned about: NAUSEA MEDICATION AND FLOMAX    Who prescribed you this medication: ER    When did you start taking this medication: N/A    What are your concerns: PT SAYS ER SENT NAUSEA MEDICATION AND FLOMAX TO [facility] AND NEEDS IT SENT TO [id number] - [place], [place] - [address] AT [facility] [address] - [phone]  - [phone] FX  [address] [place] [address]  Phone: [phone] Fax: [phone]  [facility].    PLEASE CALL PT AND INFORM HIM WHEN THIS HAS BEEN DONE.    How long have you had these concerns:

## 2024-08-21 ENCOUNTER — OFFICE VISIT (OUTPATIENT)
Dept: UROLOGY | Facility: CLINIC | Age: 54
End: 2024-08-21
Payer: COMMERCIAL

## 2024-08-21 VITALS
SYSTOLIC BLOOD PRESSURE: 130 MMHG | HEART RATE: 87 BPM | WEIGHT: 137.4 LBS | HEIGHT: 65 IN | OXYGEN SATURATION: 98 % | TEMPERATURE: 97.2 F | DIASTOLIC BLOOD PRESSURE: 90 MMHG | RESPIRATION RATE: 12 BRPM | BODY MASS INDEX: 22.89 KG/M2

## 2024-08-21 DIAGNOSIS — N13.2 URETERAL STONE WITH HYDRONEPHROSIS: Primary | ICD-10-CM

## 2024-08-21 NOTE — PROGRESS NOTES
Office Visit New Stone     Patient Name: Phani Paulson  : 1970   MRN: [id number]     Chief Complaint: Kidney Stones.    Chief Complaint   Patient presents with    Establish Care    ureterolithiasis       Referring Provider: Adeel Ramirez PA-C    History of Present Illness: Phani Paulson is a 54 y.o. male who presents today for further management of nephrolithiasis.  Mr. Paulson presented to the ED on  with complaints of left flank pain.  CT imaging revealed a 2.9 mm left UVJ stone.  He reports that he was given IV fluids and that next morning was no longer experiencing pain.  He did not see a stone pass.  Today he is not experiencing fever, chills,  nausea, vomiting, hematuria, flank pain, dysuria. He was not able to provide a urinary specimen today.      Stone prevention medications: none    Stone related history  Family history of stones:   no  Renal disease or anatomic abnormality: no  Malabsorptive disease or gastric bypass: no  Frequent UTI's    no  Parathyroid disease    no    Dietary Considerations  Soda - 1 per day  Fast food - 2 per week  Water - 6-8 glasses per day  No: Adds salt to foods    Subjective      Review of System: ROS reviewed by me and is negative unless otherwise noted in HPI.      Past Medical History:   Past Medical History:   Diagnosis Date    Acute suppurative otitis media of both ears without spontaneous rupture of tympanic membranes     Chicken pox     Esophageal stricture     GERD (gastroesophageal reflux disease)     Kidney stone 2024    Lesion of skin of face     benign    Seasonal allergies     Wears glasses      Past Surgical History:   Past Surgical History:   Procedure Laterality Date    COLONOSCOPY      4yrs     COLONOSCOPY N/A 10/07/2022    Procedure: COLONOSCOPY WITH POLYPECTOMY X1;  Surgeon: Manuel Ann MD;  Location: [facility] ENDOSCOPY;  Service: Gastroenterology;  Laterality: N/A;    ENDOSCOPY N/A 06/15/2020     "Procedure: ESOPHAGOGASTRODUODENOSCOPY WITH BIOPSY AND DILATATION;  Surgeon: Manuel Ann MD;  Location: [facility] ENDOSCOPY;  Service: Gastroenterology;  Laterality: N/A;    ENDOSCOPY N/A 08/21/2020    Procedure: ESOPHAGOGASTRODUODENOSCOPY WITH BIOPSY;  Surgeon: Manuel Ann MD;  Location: [facility] ENDOSCOPY;  Service: Gastroenterology;  Laterality: N/A;    ENDOSCOPY N/A 10/30/2020    Procedure: ESOPHAGOGASTRODUODENOSCOPY WITH COLD FORCEP BIOPSY;  Surgeon: Manuel Ann MD;  Location: [facility] ENDOSCOPY;  Service: Gastroenterology;  Laterality: N/A;    ENDOSCOPY N/A 10/07/2022    Procedure: ESOPHAGOGASTRODUODENOSCOPY WITH BIOPSY;  Surgeon: Manuel Ann MD;  Location: [facility] ENDOSCOPY;  Service: Gastroenterology;  Laterality: N/A;    SKIN BIOPSY      face-benign       Family History:   Family History   Problem Relation Age of Onset    Colon cancer Mother     Hypertension Mother     Cancer Mother     Skin cancer Father     Cirrhosis Neg Hx     Liver cancer Neg Hx     Liver disease Neg Hx      Social History:   Social History     Socioeconomic History    Marital status:    Tobacco Use    Smoking status: Never     Passive exposure: Never    Smokeless tobacco: Never   Vaping Use    Vaping status: Never Used   Substance and Sexual Activity    Alcohol use: No    Drug use: Never    Sexual activity: Defer     Medications:     Current Outpatient Medications:     Loratadine 10 MG capsule, Take 1 capsule by mouth Daily., Disp: , Rfl:     pantoprazole (PROTONIX) 20 MG EC tablet, TAKE 1 TABLET DAILY, Disp: 90 tablet, Rfl: 3    Allergies:   No Known Allergies    Objective     Physical Exam:   Vital Signs:   Vitals:    08/21/24 1444   BP: 130/90   BP Location: Left arm   Patient Position: Sitting   Cuff Size: Adult   Pulse: 87   Resp: 12   Temp: 97.2 °F (36.2 °C)   TempSrc: Temporal   SpO2: 98%   Weight: 62.3 kg (137 lb 6.4 oz)   Height: 165.5 cm (65.16\")   PainSc: 0-No pain     Body mass " "index is 22.75 kg/m².   Physical Exam  Vitals and nursing note reviewed.   Constitutional:       General: He is not in acute distress.     Appearance: Normal appearance. He is not ill-appearing.   HENT:      Head: Normocephalic and atraumatic.   Pulmonary:      Effort: Pulmonary effort is normal.   Skin:     General: Skin is warm and dry.   Neurological:      General: No focal deficit present.      Mental Status: He is alert and oriented to person, place, and time.   Psychiatric:         Mood and Affect: Mood normal.         Behavior: Behavior normal.      Labs  Lab Results   Component Value Date    GLUCOSE 105 (H) 08/05/2024    BUN 12 08/05/2024    CREATININE 1.19 08/05/2024    EGFRIFNONA 78 06/14/2021    EGFRIFAFRI 91 06/14/2021    BCR 10.1 08/05/2024    K 4.5 08/05/2024    CO2 26.0 08/05/2024    CALCIUM 9.3 08/05/2024    PROTENTOTREF 7.2 07/15/2024    ALBUMIN 4.7 08/05/2024    LABIL2 1.8 07/15/2024    AST 19 08/05/2024    ALT 17 08/05/2024     Lab Results   Component Value Date    WBC 8.28 08/05/2024    HGB 17.2 08/05/2024    HCT 49.8 08/05/2024    MCV 86.8 08/05/2024     08/05/2024     No results found for: \"LDH\", \"URICACID\"    Lab Results   Component Value Date    CALCIUM 9.3 08/05/2024     Brief Urine Lab Results  (Last result in the past 365 days)        Color   Clarity   Blood   Leuk Est   Nitrite   Protein   CREAT   Urine HCG        08/05/24 1844 Dark Yellow   Clear   Moderate (2+)   Negative   Negative   Trace                 Radiologic Studies  CT Abdomen Pelvis Stone Protocol  Result Date: 8/5/2024  Mild left hydroureteronephrosis and hematuria secondary to a 2 mm calculus at the UVJ.  Nonobstructing left renal calculus.     I have personally reviewed these labs and images.    Assessment / Plan      Assessment  Mr. Paulson is a 54 y.o. male with 2.9 mm left UVJ stone.  He is unsure if he has passed this stone as he is no longer having pain.    We had informed discussion about the causes of stones, " in the main treatments for nephrolithiasis.  The 3 main surgical treatments for stones are percutaneous nephrolithotomy, ureteroscopy and laser lithotripsy, and shockwave lithotripsy. Based on patient factors and the stone size and location, I recommend repeating imaging and straining urine in the interim.  If stone is collected he was instructed to bring it to the office for stone analysis.  If symptoms return prior to his scheduled follow up appointment he was instructed to contact our office for recommendations.  He was agreeable to this plan and will repeat CT imaging and follow up with me to discuss.      Diagnoses and all orders for this visit:    1. Ureteral stone with hydronephrosis (Primary)  -     CT Abdomen Pelvis Stone Protocol    Follow Up:   Return in about 4 weeks (around 9/18/2024) for With Felicia to review CT results; okay to do video visit.    KIMBERLY Wright, MSN, FNP-C  [facility] [place]

## 2024-08-27 ENCOUNTER — HOSPITAL ENCOUNTER (OUTPATIENT)
Dept: CT IMAGING | Facility: HOSPITAL | Age: 54
Discharge: HOME | End: 2024-08-27
Admitting: NURSE PRACTITIONER
Payer: COMMERCIAL

## 2024-08-27 PROCEDURE — 74176 CT ABD & PELVIS W/O CONTRAST: CPT

## 2024-08-29 ENCOUNTER — TELEMEDICINE (OUTPATIENT)
Dept: UROLOGY | Facility: CLINIC | Age: 54
End: 2024-08-29
Payer: COMMERCIAL

## 2024-08-29 DIAGNOSIS — N20.1 LEFT URETERAL STONE: Primary | ICD-10-CM

## 2024-08-29 DIAGNOSIS — N20.0 NEPHROLITHIASIS: ICD-10-CM

## 2024-08-29 NOTE — PROGRESS NOTES
Office Visit Established Male Patient     Patient Name: Phani Paulson  : 1970   MRN: [id number]     Chief Complaint: No chief complaint on file.      History of Present Illness: Mr. Phani Paulson is a 54 y.o. male with history of nephrolithiasis who presents today via video visit to review CT imaging results.  He is located at work in [place] and consents to receiving care by video visit.      CT imaging reveals stable 2.9 mm left UVJ stone and stable 4 mm left upper pole stone.  He is not having pain at this time and feels well.  He has been increasing his water intake substantially and is disappointed that the stone is still present.        Subjective      Review of System: ROS reviewed by me and is negative unless otherwise noted in HPI.      Past Medical History:   Past Medical History:   Diagnosis Date    Acute suppurative otitis media of both ears without spontaneous rupture of tympanic membranes     Chicken pox     Esophageal stricture     GERD (gastroesophageal reflux disease)     Kidney stone 2024    Lesion of skin of face     benign    Seasonal allergies     Wears glasses        Past Surgical History:   Past Surgical History:   Procedure Laterality Date    COLONOSCOPY      4yrs     COLONOSCOPY N/A 10/07/2022    Procedure: COLONOSCOPY WITH POLYPECTOMY X1;  Surgeon: Manuel Ann MD;  Location: [facility] ENDOSCOPY;  Service: Gastroenterology;  Laterality: N/A;    ENDOSCOPY N/A 06/15/2020    Procedure: ESOPHAGOGASTRODUODENOSCOPY WITH BIOPSY AND DILATATION;  Surgeon: Manuel Ann MD;  Location: [facility] ENDOSCOPY;  Service: Gastroenterology;  Laterality: N/A;    ENDOSCOPY N/A 2020    Procedure: ESOPHAGOGASTRODUODENOSCOPY WITH BIOPSY;  Surgeon: Manuel Ann MD;  Location: [facility] ENDOSCOPY;  Service: Gastroenterology;  Laterality: N/A;    ENDOSCOPY N/A 10/30/2020    Procedure: ESOPHAGOGASTRODUODENOSCOPY WITH COLD FORCEP BIOPSY;  Surgeon: Seth  Manuel VITALE MD;  Location: [facility] ENDOSCOPY;  Service: Gastroenterology;  Laterality: N/A;    ENDOSCOPY N/A 10/07/2022    Procedure: ESOPHAGOGASTRODUODENOSCOPY WITH BIOPSY;  Surgeon: Manuel Ann MD;  Location: [facility] ENDOSCOPY;  Service: Gastroenterology;  Laterality: N/A;    SKIN BIOPSY      face-benign       Family History:   Family History   Problem Relation Age of Onset    Colon cancer Mother     Hypertension Mother     Cancer Mother     Skin cancer Father     Cirrhosis Neg Hx     Liver cancer Neg Hx     Liver disease Neg Hx        Social History:   Social History     Socioeconomic History    Marital status:    Tobacco Use    Smoking status: Never     Passive exposure: Never    Smokeless tobacco: Never   Vaping Use    Vaping status: Never Used   Substance and Sexual Activity    Alcohol use: No    Drug use: Never    Sexual activity: Defer       Medications:     Current Outpatient Medications:     Loratadine 10 MG capsule, Take 1 capsule by mouth Daily., Disp: , Rfl:     pantoprazole (PROTONIX) 20 MG EC tablet, TAKE 1 TABLET DAILY, Disp: 90 tablet, Rfl: 3    Allergies:   No Known Allergies    Objective     Physical Exam:   Vital Signs: No vitals taken due to video video visit.    Physical Exam  Vitals and nursing note reviewed.   Constitutional:       General: He is not in acute distress.     Appearance: Normal appearance. He is not ill-appearing.   HENT:      Head: Normocephalic and atraumatic.   Pulmonary:      Effort: Pulmonary effort is normal.   Neurological:      Mental Status: He is alert and oriented to person, place, and time.   Psychiatric:         Mood and Affect: Mood normal.         Behavior: Behavior normal.     Labs  Brief Urine Lab Results  (Last result in the past 365 days)        Color   Clarity   Blood   Leuk Est   Nitrite   Protein   CREAT   Urine HCG        08/05/24 1844 Dark Yellow   Clear   Moderate (2+)   Negative   Negative   Trace                 Lab Results   Component  Value Date    GLUCOSE 105 (H) 08/05/2024    CALCIUM 9.3 08/05/2024     08/05/2024    K 4.5 08/05/2024    CO2 26.0 08/05/2024     08/05/2024    BUN 12 08/05/2024    CREATININE 1.19 08/05/2024    EGFRIFAFRI 91 06/14/2021    EGFRIFNONA 78 06/14/2021    BCR 10.1 08/05/2024    ANIONGAP 10.0 08/05/2024     Lab Results   Component Value Date    WBC 8.28 08/05/2024    HGB 17.2 08/05/2024    HCT 49.8 08/05/2024    MCV 86.8 08/05/2024     08/05/2024     Radiographic Studies  CT Abdomen Pelvis Stone Protocol    Result Date: 8/28/2024  Stable left UVJ stone without hydronephrosis. Findings are similar to the prior exam.        CT Abdomen Pelvis Stone Protocol  Result Date: 8/5/2024  Mild left hydroureteronephrosis and hematuria secondary to a 2 mm calculus at the UVJ.  Nonobstructing left renal calculus.     I have reviewed the above labs and imaging.     Assessment / Plan      Assessment/Plan: Mr. Phani Paulson is a 54 y.o. male with history of nephrolithiasis who presents today via video visit to review CT imaging results.  He is located at work in [place] and consents to receiving care by video visit.      CT imaging reveals stable 2.9 mm left UVJ stone and stable 4 mm left upper pole stone.  He is not having pain at this time and feels well.  He has been increasing his water intake substantially and is disappointed that the stone is still present.     We had informed discussion about the causes of stones, in the main treatments for nephrolithiasis.  The 3 main surgical treatments for stones are percutaneous nephrolithotomy, ureteroscopy and laser lithotripsy, and shockwave lithotripsy. Based on patient factors and the stone size and location, I recommend left URS LL with stent placement.     The patient is discussed with Dr. Solo who independently reviews and interprets associated imaging and agrees with plan.  We discussed the risks, benefits, and alternatives to this therapy.  The main risks that  we discussed were bleeding, urinary infection, damage to nearby structures, need for further procedures, and cardiopulmonary complications from anesthesia.  The patient voiced understanding and wished to proceed.     Diagnoses and all orders for this visit:    1. Left ureteral stone (Primary)    2. Nephrolithiasis    Follow Up:   Return for Schedule left URS LL with stent placement.  .    KIMBERLY Wright, MSN, FNP-C  [facility] [place]

## 2024-09-09 ENCOUNTER — OFFICE VISIT (OUTPATIENT)
Dept: INTERNAL MEDICINE | Facility: CLINIC | Age: 54
End: 2024-09-09
Payer: COMMERCIAL

## 2024-09-09 VITALS
WEIGHT: 137 LBS | RESPIRATION RATE: 20 BRPM | SYSTOLIC BLOOD PRESSURE: 133 MMHG | OXYGEN SATURATION: 99 % | DIASTOLIC BLOOD PRESSURE: 87 MMHG | BODY MASS INDEX: 22.82 KG/M2 | HEIGHT: 65 IN | HEART RATE: 65 BPM | TEMPERATURE: 97.7 F

## 2024-09-09 DIAGNOSIS — N20.0 NEPHROLITHIASIS: Primary | ICD-10-CM

## 2024-09-09 PROCEDURE — 99213 OFFICE O/P EST LOW 20 MIN: CPT | Performed by: INTERNAL MEDICINE

## 2024-09-09 NOTE — PROGRESS NOTES
"Chief Complaint  Nephrolithiasis (Wants to discuss up coming procedure)    Subjective        Phani Paulson presents to [facility]  History of Present Illness  Patient is here complaining of kidney stones, he states he was seen in the ER on August 5, 2024 and was noted to have kidney stone with hydronephrosis and repeat CAT scan showed kidney stone yet present in the right UV junction with resolution of the hydronephrosis, he was seen by urology and was advised surgery and was concerned, he is drinking plenty of water, I have reviewed his labs, CAT scan and I have advised him to proceed for surgery, he is accompanied by his wife today  Answers submitted by the patient for this visit:  Primary Reason for Visit (Submitted on 9/2/2024)  What is the primary reason for your visit?: Other  Other (Submitted on 9/2/2024)  Please describe your symptoms.: Scan determine kidney stones.  Surgery recommended.  Have you had these symptoms before?: No  How long have you been having these symptoms?: Greater than 2 weeks    Objective   Vital Signs:  /87   Pulse 65   Temp 97.7 °F (36.5 °C)   Resp 20   Ht 165.5 cm (65.16\")   Wt 62.1 kg (137 lb)   SpO2 99%   BMI 22.69 kg/m²   Estimated body mass index is 22.69 kg/m² as calculated from the following:    Height as of this encounter: 165.5 cm (65.16\").    Weight as of this encounter: 62.1 kg (137 lb).    BMI is within normal parameters. No other follow-up for BMI required.      Physical Exam  Vitals and nursing note reviewed.   Constitutional:       General: He is not in acute distress.     Appearance: Normal appearance. He is not diaphoretic.   HENT:      Head: Normocephalic and atraumatic.      Right Ear: External ear normal.      Left Ear: External ear normal.      Nose: Nose normal.   Eyes:      Extraocular Movements: Extraocular movements intact.      Conjunctiva/sclera: Conjunctivae normal.   Neck:      Trachea: Trachea normal. "   Cardiovascular:      Rate and Rhythm: Normal rate and regular rhythm.      Heart sounds: Normal heart sounds.   Pulmonary:      Effort: Pulmonary effort is normal. No respiratory distress.   Abdominal:      General: Abdomen is flat.   Musculoskeletal:      Cervical back: Neck supple.      Comments: Moves all limbs   Skin:     General: Skin is warm and dry.      Findings: No erythema.   Neurological:      Mental Status: He is alert and oriented to person, place, and time.      Comments: No gross motor or sensory deficits        Result Review :  The following data was reviewed by: Annabelle Jarvis MD on 09/09/2024:  CMP          7/15/2024    09:52 8/5/2024    17:49   CMP   Glucose 95  105    BUN 11  12    Creatinine 0.95  1.19    EGFR  73.5    Sodium 139  141    Potassium 4.7  4.5    Chloride 104  105    Calcium 9.6  9.3    Total Protein 7.2     Total Protein  7.9    Albumin 4.6  4.7    Globulin 2.6     Globulin  3.2    Total Bilirubin 0.6  0.5    Alkaline Phosphatase 83  89    AST (SGOT) 16  19    ALT (SGPT) 13  17    Albumin/Globulin Ratio  1.5    BUN/Creatinine Ratio 11.6  10.1    Anion Gap  10.0      CBC          1/8/2024    16:53 7/15/2024    09:52 8/5/2024    17:49   CBC   WBC 8.29  6.72  8.28    RBC 5.55  6.00  5.74    Hemoglobin 16.6  17.9  17.2    Hematocrit 47.7  52.5  49.8    MCV 85.9  87.5  86.8    MCH 29.9  29.8  30.0    MCHC 34.8  34.1  34.5    RDW 12.5  12.0  12.8    Platelets 301  351  272           ED with Carter Burrows MD (08/05/2024)    CT Abdomen Pelvis Stone Protocol (08/27/2024 15:56)  CT Abdomen Pelvis Stone Protocol (08/05/2024 17:49)  Telemedicine with Felicia Blair APRN (08/29/2024)   Assessment and Plan   Diagnoses and all orders for this visit:    1. Nephrolithiasis (Primary)      Plan:  1.  Nephrolithiasis: CAT scan reviewed, oral hydration, labs reviewed, patient advised to follow-up with urology and undergo procedure as scheduled       Follow Up      Patient was given  instructions and counseling regarding his condition or for health maintenance advice. Please see specific information pulled into the AVS if appropriate.

## 2024-09-17 ENCOUNTER — TELEPHONE (OUTPATIENT)
Dept: UROLOGY | Facility: CLINIC | Age: 54
End: 2024-09-17

## 2024-09-17 ENCOUNTER — TRANSCRIBE ORDERS (OUTPATIENT)
Dept: LAB | Facility: HOSPITAL | Age: 54
End: 2024-09-17
Payer: COMMERCIAL

## 2024-09-17 ENCOUNTER — LAB (OUTPATIENT)
Dept: LAB | Facility: HOSPITAL | Age: 54
End: 2024-09-17
Payer: COMMERCIAL

## 2024-09-17 DIAGNOSIS — N20.0 URIC ACID NEPHROLITHIASIS: ICD-10-CM

## 2024-09-17 DIAGNOSIS — N20.0 URIC ACID NEPHROLITHIASIS: Primary | ICD-10-CM

## 2024-09-17 PROCEDURE — 82365 CALCULUS SPECTROSCOPY: CPT

## 2024-09-22 DIAGNOSIS — N20.0 NEPHROLITHIASIS: ICD-10-CM

## 2024-09-23 RX ORDER — TAMSULOSIN HYDROCHLORIDE 0.4 MG/1
1 CAPSULE ORAL NIGHTLY
Qty: 10 CAPSULE | Refills: 0 | Status: SHIPPED | OUTPATIENT
Start: 2024-09-23

## 2024-09-26 LAB
CALCIUM OXALATE DIHYDRATE MFR STONE IR: 30 %
COLOR STONE: NORMAL
COM MFR STONE: 70 %
COMPN STONE: NORMAL
LABORATORY COMMENT REPORT: NORMAL
LABORATORY COMMENT REPORT: NORMAL
Lab: NORMAL
Lab: NORMAL
PHOTO: NORMAL
SIZE STONE: NORMAL MM
SPEC SOURCE SUBJ: NORMAL
WT STONE: 10 MG

## 2024-10-07 ENCOUNTER — PROCEDURE VISIT (OUTPATIENT)
Dept: UROLOGY | Facility: CLINIC | Age: 54
End: 2024-10-07
Payer: COMMERCIAL

## 2024-10-07 DIAGNOSIS — N20.0 NEPHROLITHIASIS: Primary | ICD-10-CM

## 2024-10-07 PROCEDURE — 52310 CYSTOSCOPY AND TREATMENT: CPT | Performed by: UROLOGY

## 2024-10-07 NOTE — PROGRESS NOTES
Preoperative diagnosis  Foreign body in genitourinary tract    Postoperative diagnosis  Foreign body in genitourinary tract    Procedure  Flexible cystourethroscopy with stent removal    Attending surgeon  Andrez Solo MD    Anesthesia  2% lidocaine jelly intraurethrally    Complications  None    Indications  54 y.o. male who is status post ureteroscopy with laser lithotripsy who presents for stent removal.    Procedure  Detailed information of all possible complications and side effects were discussed with the patient.  Informed consent was obtained. Patient was given one dose of antibiotics. The patient was placed in supine position and a timeout was performed. The patient was prepped and draped in sterile fashion.  Next, 2% lidocaine jelly was bluntly injected per urethra without difficulty. The 14 [place] flexible cystoscope was passed through the urethra and into the bladder.  The stent was visualized, grasped and removed in its entirety.  The patient tolerated the procedure well.    Plan  1. Provided education regarding water intake of at least 2 liters per day  2. F/u in 8 weeks with a renal ultrasound with HERMELINDA

## 2024-12-09 ENCOUNTER — HOSPITAL ENCOUNTER (OUTPATIENT)
Dept: ULTRASOUND IMAGING | Facility: HOSPITAL | Age: 54
Discharge: HOME | End: 2024-12-09
Admitting: UROLOGY
Payer: COMMERCIAL

## 2024-12-09 DIAGNOSIS — N20.0 NEPHROLITHIASIS: ICD-10-CM

## 2024-12-09 PROCEDURE — 76775 US EXAM ABDO BACK WALL LIM: CPT

## 2024-12-10 ENCOUNTER — OFFICE VISIT (OUTPATIENT)
Dept: UROLOGY | Facility: CLINIC | Age: 54
End: 2024-12-10
Payer: COMMERCIAL

## 2024-12-10 VITALS
OXYGEN SATURATION: 98 % | SYSTOLIC BLOOD PRESSURE: 124 MMHG | DIASTOLIC BLOOD PRESSURE: 74 MMHG | HEIGHT: 65 IN | WEIGHT: 137 LBS | TEMPERATURE: 97.8 F | BODY MASS INDEX: 22.82 KG/M2 | HEART RATE: 87 BPM

## 2024-12-10 DIAGNOSIS — N20.0 NEPHROLITHIASIS: Primary | ICD-10-CM

## 2024-12-10 LAB
BILIRUB BLD-MCNC: NEGATIVE MG/DL
CLARITY, POC: CLEAR
COLOR UR: YELLOW
EXPIRATION DATE: NORMAL
GLUCOSE UR STRIP-MCNC: NEGATIVE MG/DL
KETONES UR QL: NEGATIVE
LEUKOCYTE EST, POC: NEGATIVE
Lab: NORMAL
NITRITE UR-MCNC: NEGATIVE MG/ML
PH UR: 7 [PH] (ref 5–8)
PROT UR STRIP-MCNC: NEGATIVE MG/DL
RBC # UR STRIP: NEGATIVE /UL
SP GR UR: 1.01 (ref 1–1.03)
UROBILINOGEN UR QL: NORMAL

## 2024-12-10 NOTE — PROGRESS NOTES
Office Visit Established Male Patient     Patient Name: Phani Paulson  : 1970   MRN: [id number]     Chief Complaint:   Chief Complaint   Patient presents with    Follow-up     8 week follow up with Renal US        History of Present Illness: Mr. Phani Paulson is a 54 y.o. male who presents today for follow up with .  Does have occasional mild left flank discomfort       Subjective      Review of System:   As noted in HPI    Past Medical History:   Past Medical History:   Diagnosis Date    Acute suppurative otitis media of both ears without spontaneous rupture of tympanic membranes     Allergic     Chicken pox     Esophageal stricture     GERD (gastroesophageal reflux disease)     Kidney stone 2024    Lesion of skin of face     benign    Seasonal allergies     Wears glasses        Past Surgical History:   Past Surgical History:   Procedure Laterality Date    COLONOSCOPY      4yrs     COLONOSCOPY N/A 10/07/2022    Procedure: COLONOSCOPY WITH POLYPECTOMY X1;  Surgeon: Manuel Ann MD;  Location: [facility] ENDOSCOPY;  Service: Gastroenterology;  Laterality: N/A;    ENDOSCOPY N/A 06/15/2020    Procedure: ESOPHAGOGASTRODUODENOSCOPY WITH BIOPSY AND DILATATION;  Surgeon: Manuel Ann MD;  Location: [facility] ENDOSCOPY;  Service: Gastroenterology;  Laterality: N/A;    ENDOSCOPY N/A 2020    Procedure: ESOPHAGOGASTRODUODENOSCOPY WITH BIOPSY;  Surgeon: Manuel Ann MD;  Location: [facility] ENDOSCOPY;  Service: Gastroenterology;  Laterality: N/A;    ENDOSCOPY N/A 10/30/2020    Procedure: ESOPHAGOGASTRODUODENOSCOPY WITH COLD FORCEP BIOPSY;  Surgeon: Manuel Ann MD;  Location: [facility] ENDOSCOPY;  Service: Gastroenterology;  Laterality: N/A;    ENDOSCOPY N/A 10/07/2022    Procedure: ESOPHAGOGASTRODUODENOSCOPY WITH BIOPSY;  Surgeon: Manuel Ann MD;  Location: [facility] ENDOSCOPY;  Service: Gastroenterology;  Laterality: N/A;    SKIN BIOPSY      face-benign  "      Family History:   Family History   Problem Relation Age of Onset    Colon cancer Mother     Hypertension Mother     Cancer Mother     Skin cancer Father     Cancer Maternal Grandfather     Cancer Paternal Grandfather         prostate    Cirrhosis Neg Hx     Liver cancer Neg Hx     Liver disease Neg Hx        Social History:   Social History     Socioeconomic History    Marital status:    Tobacco Use    Smoking status: Never     Passive exposure: Never    Smokeless tobacco: Never   Vaping Use    Vaping status: Never Used   Substance and Sexual Activity    Alcohol use: No    Drug use: Never    Sexual activity: Never       Medications:     Current Outpatient Medications:     docusate sodium (Colace) 100 MG capsule, Take 1 capsule by mouth 2 (Two) Times a Day if taking Oxycodone., Disp: 15 capsule, Rfl: 1    Loratadine 10 MG capsule, Take 1 capsule by mouth Daily., Disp: , Rfl:     oxybutynin XL (Ditropan XL) 10 MG 24 hr tablet, Take 1 tablet by mouth Daily As Needed for bladder spasm., Disp: 10 tablet, Rfl: 0    oxyCODONE (Roxicodone) 5 MG immediate release tablet, Take 1 tablet by mouth Every 6 (Six) Hours As Needed for Moderate Pain or Severe Pain., Disp: 5 tablet, Rfl: 0    sulfamethoxazole-trimethoprim (Bactrim DS) 800-160 MG per tablet, Take 1 tablet by mouth 2 (Two) Times a Day., Disp: 6 tablet, Rfl: 0    tamsulosin (FLOMAX) 0.4 MG capsule 24 hr capsule, Take 1 capsule by mouth Every Night., Disp: 10 capsule, Rfl: 0    pantoprazole (PROTONIX) 20 MG EC tablet, TAKE 1 TABLET DAILY, Disp: 90 tablet, Rfl: 3    Allergies:   No Known Allergies    Objective     Physical Exam:   Vital Signs:   Vitals:    12/10/24 1512   BP: 124/74   BP Location: Left arm   Patient Position: Sitting   Cuff Size: Adult   Pulse: 87   Temp: 97.8 °F (36.6 °C)   TempSrc: Temporal   SpO2: 98%   Weight: 62.1 kg (137 lb)   Height: 165.5 cm (65.16\")     Body mass index is 22.69 kg/m².     Physical Exam  Vitals and nursing note " reviewed.   Constitutional:       General: He is not in acute distress.     Appearance: Normal appearance. He is well-groomed and normal weight. He is not ill-appearing.   Pulmonary:      Effort: Pulmonary effort is normal. No respiratory distress.   Skin:     General: Skin is warm and dry.   Neurological:      General: No focal deficit present.      Mental Status: He is alert and oriented to person, place, and time.   Psychiatric:         Mood and Affect: Mood normal.         Behavior: Behavior normal.        Labs  Brief Urine Lab Results  (Last result in the past 365 days)        Color   Clarity   Blood   Leuk Est   Nitrite   Protein   CREAT   Urine HCG        12/10/24 1531 Yellow   Clear   Negative   Negative   Negative   Negative                   Lab Results   Component Value Date    GLUCOSE 105 (H) 08/05/2024    CALCIUM 9.3 08/05/2024     08/05/2024    K 4.5 08/05/2024    CO2 26.0 08/05/2024     08/05/2024    BUN 12 08/05/2024    CREATININE 1.19 08/05/2024    EGFRIFAFRI 91 06/14/2021    EGFRIFNONA 78 06/14/2021    BCR 10.1 08/05/2024    ANIONGAP 10.0 08/05/2024       Lab Results   Component Value Date    WBC 8.28 08/05/2024    HGB 17.2 08/05/2024    HCT 49.8 08/05/2024    MCV 86.8 08/05/2024     08/05/2024            Radiographic Studies  US Renal Bilateral    Result Date: 12/10/2024  Mild prominence of the left renal pelvis as above.      Images were reviewed, interpreted, and dictated by Dr. Gabriella Frank MD Transcribed by Clare Sandoval PA-C.  This report was signed and finalized on 12/10/2024 9:44 AM by Gabriella Frank MD.      CT Abdomen Pelvis Stone Protocol    Result Date: 8/28/2024  Stable left UVJ stone without hydronephrosis. Findings are similar to the prior exam.     CTDI: 3.72 mGy DLP: 177.26 mGy.cm   This study was performed with techniques to keep radiation doses as low as reasonably achievable (ALARA). Individualized dose reduction techniques using automated exposure control or  adjustment of mA and/or kV according to the patient size were employed.     Images were reviewed, interpreted, and dictated by Dr. Lorenzo Triana MD Transcribed by Clare Sandoval PA-C.  This report was signed and finalized on 8/28/2024 12:10 PM by Lorenzo Triana MD.        I have reviewed the above labs and imaging.     Assessment / Plan      Assessment/Plan:   Diagnoses and all orders for this visit:    1. Nephrolithiasis (Primary)  -     POC Urinalysis Dipstick, Automated  -     CT Abdomen Pelvis Stone Protocol; Future     53 yo make here s/p Left URS/LL for renal u/s results. He is doing well ultrasound shows mildly dilated calyces but no hydronephrosis. Will monitor in 3 months with repeat CT to confirm he is not developing hydronephrosis due to needing ureteral dilation during surgery.      CT stone protocol 3 months    Follow Up:   Return in about 3 months (around 3/10/2025).    KIMBERLY Navarrete,NP-C  [facility]

## 2024-12-11 RX ORDER — PANTOPRAZOLE SODIUM 20 MG/1
20 TABLET, DELAYED RELEASE ORAL DAILY
Qty: 90 TABLET | Refills: 3 | Status: SHIPPED | OUTPATIENT
Start: 2024-12-11

## 2025-01-08 ENCOUNTER — OFFICE VISIT (OUTPATIENT)
Dept: GASTROENTEROLOGY | Facility: CLINIC | Age: 55
End: 2025-01-08
Payer: COMMERCIAL

## 2025-01-08 VITALS
OXYGEN SATURATION: 97 % | TEMPERATURE: 98.2 F | WEIGHT: 144 LBS | DIASTOLIC BLOOD PRESSURE: 85 MMHG | SYSTOLIC BLOOD PRESSURE: 118 MMHG | BODY MASS INDEX: 23.99 KG/M2 | HEART RATE: 67 BPM | HEIGHT: 65 IN

## 2025-01-08 DIAGNOSIS — K21.9 GASTROESOPHAGEAL REFLUX DISEASE WITHOUT ESOPHAGITIS: ICD-10-CM

## 2025-01-08 DIAGNOSIS — Z87.898 HISTORY OF DYSPHAGIA: ICD-10-CM

## 2025-01-08 DIAGNOSIS — K20.0 ESOPHAGITIS, EOSINOPHILIC: Primary | ICD-10-CM

## 2025-01-08 PROCEDURE — 99213 OFFICE O/P EST LOW 20 MIN: CPT | Performed by: INTERNAL MEDICINE

## 2025-01-08 RX ORDER — VALACYCLOVIR HYDROCHLORIDE 500 MG/1
1 TABLET, FILM COATED ORAL EVERY 12 HOURS SCHEDULED
COMMUNITY
Start: 2024-12-15

## 2025-01-08 NOTE — PROGRESS NOTES
Follow Up Note     Date: 2025   Patient Name: Phani Paulson  MRN: [id number]  : 1970     Referring Physician: Annabelle Jarvis MD    Chief Complaint:    Chief Complaint   Patient presents with    Difficulty Swallowing    EOE       Interval History:   2025  Phani Paulson is a 54 y.o. male who is here today for follow up for his history of food dysphagia and EOE.  He states that he has been swallowing well without any current symptoms.  He had a recent procedure for kidney stones.    2020  Phani Paulson is a 50 y.o. male who is here today to establish care with Gastroenterology for evaluation of dysphagia.  The patient has difficulty swallowing off and on for the last six months. The symptom is moderate in severity, occurs occasionally once in few weeks mostly associated with solid foods.  The symptoms are progressive now.  Recently he had choking with spagetti and he had to literally cough it out after some time. Whe these happens he will drink water and walk around and gets better he says. The patient points towards the upper substernal area. He deny any odynophagia. He has history of acid reflux on and off few years. Takes occasional Tums otherwise not on any medication. He may have reflux episodes once a week or so.  Deny any nausea or vomiting.  There is no associated weight loss. No h/o food allergy.  No associated abdominal pain or distension. Deny  any change in bowel habit, hematochezia or melena.   There is no history of anemia. No prior history of EGD. He had a colonoscopy 2 years ago and few polyps removed. Family history of colon cancer- Mother had colon CA at 65yrs of age. No other any GI malignancy.  Non smoker  and no ETOH abuse    Subjective      Past Medical History:   Past Medical History:   Diagnosis Date    Acute suppurative otitis media of both ears without spontaneous rupture of tympanic membranes     Allergic     Chicken pox     Esophageal stricture      GERD (gastroesophageal reflux disease)     Kidney stone 08/07/2024    Lesion of skin of face     benign    Seasonal allergies     Wears glasses      Past Surgical History:   Past Surgical History:   Procedure Laterality Date    COLONOSCOPY      4yrs     COLONOSCOPY N/A 10/07/2022    Procedure: COLONOSCOPY WITH POLYPECTOMY X1;  Surgeon: Manuel Ann MD;  Location: [facility] ENDOSCOPY;  Service: Gastroenterology;  Laterality: N/A;    ENDOSCOPY N/A 06/15/2020    Procedure: ESOPHAGOGASTRODUODENOSCOPY WITH BIOPSY AND DILATATION;  Surgeon: Manuel Ann MD;  Location: [facility] ENDOSCOPY;  Service: Gastroenterology;  Laterality: N/A;    ENDOSCOPY N/A 08/21/2020    Procedure: ESOPHAGOGASTRODUODENOSCOPY WITH BIOPSY;  Surgeon: Manuel Ann MD;  Location: [facility] ENDOSCOPY;  Service: Gastroenterology;  Laterality: N/A;    ENDOSCOPY N/A 10/30/2020    Procedure: ESOPHAGOGASTRODUODENOSCOPY WITH COLD FORCEP BIOPSY;  Surgeon: Manuel Ann MD;  Location: [facility] ENDOSCOPY;  Service: Gastroenterology;  Laterality: N/A;    ENDOSCOPY N/A 10/07/2022    Procedure: ESOPHAGOGASTRODUODENOSCOPY WITH BIOPSY;  Surgeon: Manuel Ann MD;  Location: [facility] ENDOSCOPY;  Service: Gastroenterology;  Laterality: N/A;    KIDNEY STONE SURGERY N/A 2024    SKIN BIOPSY      face-benign       Family History:   Family History   Problem Relation Age of Onset    Colon cancer Mother     Hypertension Mother     Cancer Mother     Skin cancer Father     Cancer Maternal Grandfather     Cancer Paternal Grandfather         prostate    Cirrhosis Neg Hx     Liver cancer Neg Hx     Liver disease Neg Hx        Social History:   Social History     Socioeconomic History    Marital status:    Tobacco Use    Smoking status: Never     Passive exposure: Never    Smokeless tobacco: Never   Vaping Use    Vaping status: Never Used   Substance and Sexual Activity    Alcohol use: No    Drug use: Never    Sexual activity: Never  "      Medications:     Current Outpatient Medications:     Loratadine 10 MG capsule, Take 1 capsule by mouth Daily., Disp: , Rfl:     pantoprazole (PROTONIX) 20 MG EC tablet, TAKE 1 TABLET DAILY, Disp: 90 tablet, Rfl: 3    valACYclovir (VALTREX) 500 MG tablet, Take 1 tablet by mouth Every 12 (Twelve) Hours., Disp: , Rfl:     Allergies:   No Known Allergies    Review of Systems:   Review of Systems   Constitutional:  Negative for appetite change, fatigue, fever and unexpected weight loss.   HENT:  Negative for trouble swallowing.    Gastrointestinal:  Negative for abdominal distention, abdominal pain, anal bleeding, blood in stool, constipation, diarrhea, nausea, rectal pain, vomiting, GERD and indigestion.       The following portions of the patient's history were reviewed and updated as appropriate: allergies, current medications, past family history, past medical history, past social history, past surgical history and problem list.    Objective     Physical Exam:  Vital Signs:   Vitals:    01/08/25 1530   BP: 118/85   Pulse: 67   Temp: 98.2 °F (36.8 °C)   TempSrc: Infrared   SpO2: 97%   Weight: 65.3 kg (144 lb)  Comment: with clothing/shoes   Height: 165.1 cm (65\")  Comment: per patient.       Physical Exam  Constitutional:       Appearance: Normal appearance.   HENT:      Head: Normocephalic and atraumatic.   Eyes:      Conjunctiva/sclera: Conjunctivae normal.   Abdominal:      General: Abdomen is flat. There is no distension.      Palpations: There is no mass.      Tenderness: There is no abdominal tenderness. There is no guarding or rebound.      Hernia: No hernia is present.   Musculoskeletal:      Cervical back: Normal range of motion and neck supple.   Neurological:      Mental Status: He is alert.         Results Review:   I reviewed the patient's new clinical results.    Office Visit on 12/10/2024   Component Date Value Ref Range Status    Color 12/10/2024 Yellow  Yellow, Straw, Dark Yellow, Esme Final    " Clarity, UA 12/10/2024 Clear  Clear Final    Specific Gravity  12/10/2024 1.015  1.005 - 1.030 Final    pH, Urine 12/10/2024 7.0  5.0 - 8.0 Final    Leukocytes 12/10/2024 Negative  Negative Final    Nitrite, UA 12/10/2024 Negative  Negative Final    Protein, POC 12/10/2024 Negative  Negative mg/dL Final    Glucose, UA 12/10/2024 Negative  Negative mg/dL Final    Ketones, UA 12/10/2024 Negative  Negative Final    Urobilinogen, UA 12/10/2024 Normal  Normal, 0.2 E.U./dL Final    Bilirubin 12/10/2024 Negative  Negative Final    Blood, UA 12/10/2024 Negative  Negative Final    Lot Number 12/10/2024 [id number]   Final    Expiration Date 12/10/2024 5,[id number]   Final      US Renal Bilateral    Result Date: 12/10/2024  Mild prominence of the left renal pelvis as above.      Images were reviewed, interpreted, and dictated by Dr. Gabriella Frank MD Transcribed by Clare Sandoval PA-C.  This report was signed and finalized on 12/10/2024 9:44 AM by Gabriella Frank MD.     EGD on 10/07/2022  - Normal oropharynx.  - Z-line regular, 38 cm from the incisors.  - Small hiatal hernia  - No gross lesions in the entire esophagus. Biopsied for surveillance. No endoscopic signs of EoE identified this time  - Erosive gastropathy with stigmata of recent bleeding. Copuld NSAID related  - Erythematous mucosa in the posterior wall of the stomach, antrum and prepyloric region of the stomach. Biopsied.  - Normal duodenal bulb, first portion of the duodenum, second portion of the duodenum and third portion of the Duodenum.    DIAGNOSIS:  A. PROXIMAL ESOPHAGUS, BIOPSY:  Reactive squamous mucosa  Negative for eosinophils  B. DISTAL ESOPHAGUS, BIOPSIES:  Reactive squamous mucosa  Negative for eosinophils  C. MID ESOPHAGUS, BIOPSIES:  Reactive squamous mucosa  Negative for eosinophils  D. GASTRIC BIOPSIES:  Reactive gastropathy and mild chronic gastritis  E. RECTOSIGMOID COLON, POLYP, BIOPSY:  Compatible with hyperplastic polyp     Colonoscopy 10/7/2022  -  One 2 to 3 mm polyp at the recto-sigmoid colon, removed with a cold snare. Resected and retrieved. Clinically hyperplastic.  - Small developing Diverticulosis in the sigmoid colon.  - Non-bleeding external hemorrhoids.  - the examined portion of the ileum was normal      Assessment / Plan      1.  History of PPI responsive eosinophilic esophagitis  2.  History of esophageal stricture   3.  History of Schatzki's ring GE junction  4.  Gastroesophageal reflux disease without esophagitis  1/8/2025  No current reflux symptoms.  No current dysphagia.  He has been taking Protonix 20 mg p.o. daily.  Recent lab work on 8/5/2024 reviewed and reveals normal CBC and CMP except borderline glucose of 105..  As patient is clinically doing well we will defer any endoscopic procedures for now.  Will consider EGD along with colonoscopy in 2027.  Continue low-dose Protonix 20 mg p.o. daily  He was given a Prescription for Protonix 20 p.o. daily for 1 year  Follow up 1 yr    1/8/2024  EGD on 10/7/2022 revealed a small hiatal hernia.  Significant esophageal ring or obstruction.  Minimal erosive gastropathy and biopsies consistent with chronic gastritis.  H. pylori negative.  Esophageal biopsies were normal without any eosinophils.    12/10/2020  His EGD done on 10/30/2020 again revealed signs of EOE with a ERESS  score of 2.   His esophageal biopsies at this time showed a chronic inflammation and reactive changes with the eosinophils 10 per high-power field.  His pathology findings significant improved from prior finding of 38 eosinophils per high-power field. He had an EGD done on 6/15/2020.  EGD revealed a mild upper esophageal stricture.  He had a esophageal savory dilatation performed and had multiple biopsies obtained to rule out EOE.  His biopsies revealed a 38 eosinophils per high-power field suggesting eosinophilic esophagitis.  He did not have any classical signs of EOE and endoscopy however he did have a mild upper esophageal  narrowing.  EGD also showed small hiatal hernia.  Gastric biopsies were negative for H. pylori.  Lab reports and pathology reports have been discussed with the patient.  His food allergy profile came out positive for allergy to cows milk, corn, sesame seed. I have advised to avoid about.  We will treat him with high-dose PPI for 8 weeks to rule out PPI responsive EOE     Prior history  5. Personal history of colonic polyps  6. Family hx of colon cancer  1/9/2023  Colonoscopy done on 10/7/2022 revealed a small rectosigmoid polyp which was hyperplastic, reticulosis otherwise unremarkable.  Due to family history of colon cancer with her mother at age of 60 he needs a screening colonoscopy in 5 years time in October 2027    Follow Up:   No follow-ups on file.    Manuel Ann MD  [facility] [place]  1/8/2025  15:32 EST     Please note that portions of this note may have been completed with a voice recognition program.

## 2025-03-06 ENCOUNTER — HOSPITAL ENCOUNTER (OUTPATIENT)
Dept: CT IMAGING | Facility: HOSPITAL | Age: 55
Discharge: HOME | End: 2025-03-06
Admitting: NURSE PRACTITIONER
Payer: COMMERCIAL

## 2025-03-06 DIAGNOSIS — N20.0 NEPHROLITHIASIS: ICD-10-CM

## 2025-03-06 PROCEDURE — 74176 CT ABD & PELVIS W/O CONTRAST: CPT

## 2025-03-11 ENCOUNTER — OFFICE VISIT (OUTPATIENT)
Dept: UROLOGY | Facility: CLINIC | Age: 55
End: 2025-03-11
Payer: COMMERCIAL

## 2025-03-11 VITALS
DIASTOLIC BLOOD PRESSURE: 86 MMHG | WEIGHT: 135 LBS | BODY MASS INDEX: 21.69 KG/M2 | HEIGHT: 66 IN | TEMPERATURE: 98.4 F | HEART RATE: 72 BPM | SYSTOLIC BLOOD PRESSURE: 120 MMHG | OXYGEN SATURATION: 100 %

## 2025-03-11 DIAGNOSIS — N42.9 DISORDER OF PROSTATE: ICD-10-CM

## 2025-03-11 DIAGNOSIS — N20.0 NEPHROLITHIASIS: Primary | ICD-10-CM

## 2025-03-11 NOTE — PROGRESS NOTES
PSA obtained per provider order.  Venipuncture into left AC.  Pressure applied until patient was no longer bleeding.  Tolerated well.

## 2025-03-11 NOTE — PROGRESS NOTES
Office Visit     Patient Name: Phani Paulson  : 1970   MRN: [id number]     Patient or patient representative verbalized consent for the use of Ambient Listening during the visit with  KIMBERLY Castaneda for chart documentation. 3/11/2025  16:04 EDT    Chief Complaint:   Chief Complaint   Patient presents with    Follow-up     Nephrolithiasis       Referring Provider: No ref. provider found    Primary Care Provider: Annabelle Jarvis MD     History of Present Illness  The patient is a 55-year-old male presenting for CT results.    Kidney Stones  - Asymptomatic for kidney stones since her last visit  - He reports increasing his daily water intake to four 16-ounce bottles.  - He has reduced meat consumption since his last kidney stones episode.    FAMILY HISTORY  His grandfather had prostate cancer.      Subjective   Review of System:   As noted in HPI.    Past Medical History:   Diagnosis Date    Acute suppurative otitis media of both ears without spontaneous rupture of tympanic membranes     Allergic     Chicken pox     Esophageal stricture     GERD (gastroesophageal reflux disease)     Kidney stone 2024    Lesion of skin of face     benign    Seasonal allergies     Wears glasses      Past Surgical History:   Procedure Laterality Date    COLONOSCOPY      6yrs     COLONOSCOPY N/A 10/07/2022    Procedure: COLONOSCOPY WITH POLYPECTOMY X1;  Surgeon: Manuel Ann MD;  Location: [facility] ENDOSCOPY;  Service: Gastroenterology;  Laterality: N/A;    ENDOSCOPY N/A 06/15/2020    Procedure: ESOPHAGOGASTRODUODENOSCOPY WITH BIOPSY AND DILATATION;  Surgeon: Manuel Ann MD;  Location: [facility] ENDOSCOPY;  Service: Gastroenterology;  Laterality: N/A;    ENDOSCOPY N/A 2020    Procedure: ESOPHAGOGASTRODUODENOSCOPY WITH BIOPSY;  Surgeon: Manuel Ann MD;  Location: [facility] ENDOSCOPY;  Service: Gastroenterology;  Laterality: N/A;    ENDOSCOPY N/A 10/30/2020    Procedure:  "ESOPHAGOGASTRODUODENOSCOPY WITH COLD FORCEP BIOPSY;  Surgeon: Manuel Ann MD;  Location: [facility] ENDOSCOPY;  Service: Gastroenterology;  Laterality: N/A;    ENDOSCOPY N/A 10/07/2022    Procedure: ESOPHAGOGASTRODUODENOSCOPY WITH BIOPSY;  Surgeon: Manuel Ann MD;  Location: [facility] ENDOSCOPY;  Service: Gastroenterology;  Laterality: N/A;    KIDNEY STONE SURGERY N/A 2024    SKIN BIOPSY      face-benign     Family History   Problem Relation Age of Onset    Colon cancer Mother     Hypertension Mother     Cancer Mother     Skin cancer Father     Cancer Maternal Grandfather     Cancer Paternal Grandfather         prostate    Cirrhosis Neg Hx     Liver cancer Neg Hx     Liver disease Neg Hx      Social History     Socioeconomic History    Marital status:    Tobacco Use    Smoking status: Never     Passive exposure: Never    Smokeless tobacco: Never   Vaping Use    Vaping status: Never Used   Substance and Sexual Activity    Alcohol use: No    Drug use: Never    Sexual activity: Never       Current Outpatient Medications:     Loratadine 10 MG capsule, Take 1 capsule by mouth Daily., Disp: , Rfl:     pantoprazole (PROTONIX) 20 MG EC tablet, TAKE 1 TABLET DAILY, Disp: 90 tablet, Rfl: 3    valACYclovir (VALTREX) 500 MG tablet, Take 1 tablet by mouth Every 12 (Twelve) Hours., Disp: , Rfl:     No Known Allergies  Objective   Visit Vitals  /86 (BP Location: Left arm, Patient Position: Sitting, Cuff Size: Adult)   Pulse 72   Temp 98.4 °F (36.9 °C) (Temporal)   Ht 167.6 cm (66\")   Wt 61.2 kg (135 lb)   SpO2 100%   BMI 21.79 kg/m²        Body mass index is 21.79 kg/m².     Physical Exam  Vitals and nursing note reviewed.   Constitutional:       General: He is not in acute distress.     Appearance: Normal appearance. He is normal weight. He is not ill-appearing.   Pulmonary:      Effort: Pulmonary effort is normal. No respiratory distress.   Skin:     General: Skin is warm and dry.   Neurological:      " "General: No focal deficit present.      Mental Status: He is alert and oriented to person, place, and time.   Psychiatric:         Mood and Affect: Mood normal.         Behavior: Behavior normal.         Labs  Lab Results   Component Value Date    COLORU Yellow 12/10/2024    CLARITYU Clear 12/10/2024    SPECGRAV 1.015 12/10/2024    PHUR 7.0 12/10/2024    LEUKOCYTESUR Negative 12/10/2024    NITRITE Negative 12/10/2024    PROTEINPOCUA Negative 12/10/2024    GLUCOSEUR Negative 12/10/2024    KETONESU Negative 12/10/2024    UROBILINOGEN Normal 12/10/2024    BILIRUBINUR Negative 12/10/2024    RBCUR Negative 12/10/2024      Lab Results   Component Value Date    WBCUA 0-2 08/05/2024    RBCUA 3-5 (A) 08/05/2024    BACTERIA None Seen 08/05/2024    HYALCASTU None Seen 08/05/2024    SQUAMEPIUA 7-12 (A) 08/05/2024        Lab Results   Component Value Date    WBC 8.28 08/05/2024    HGB 17.2 08/05/2024    HCT 49.8 08/05/2024    MCV 86.8 08/05/2024     08/05/2024     Lab Results   Component Value Date    GLUCOSE 105 (H) 08/05/2024    CALCIUM 9.3 08/05/2024     08/05/2024    K 4.5 08/05/2024    CO2 26.0 08/05/2024     08/05/2024    BUN 12 08/05/2024    BUN 11 07/15/2024    CREATININE 1.19 08/05/2024    CREATININE 0.95 07/15/2024    EGFR 73.5 08/05/2024    EGFR 96.3 07/15/2024    BCR 10.1 08/05/2024    ANIONGAP 10.0 08/05/2024    ALT 17 08/05/2024    AST 19 08/05/2024       Lab Results   Component Value Date    HGBA1C 5.20 07/15/2024        No results found for: \"PSA\", \"PCTFREEPSA\", \"PROSTATEBIO\"    Lab Results   Component Value Date    TTSP5IUFNKR 70 09/17/2024    SYJU3PEVIY 30 09/17/2024     Lab Results   Component Value Date    QRRO60PG 30.0 06/16/2022     No results found for: \"CYSTINE\", \"URINEVOLUM\", \"CALCIUMUR\", \"OXALATEU\", \"CITRATEUR\", \"LABPH\", \"URICUR\", \"NAUR\", \"KUR\", \"MAGNESIUMUR\", \"PHOSUR\", \"AMMONIUMUR\", \"CLUR\", \"WXSMUURYP36T\", \"UREAUR\", \"LABCREAUR\"    No results found for: \"TESTOSTEROTT\", \"TESTFRE\", " "\"PSA\", \"ESTRADIOL\", \"LH\", \"PROLACTIN\", \"FSH\"    Lab Results   Component Value Date    TSH 1.020 07/15/2024    YTZVJKZU44 461 07/15/2024    BBVZ63MF 30.0 06/16/2022         Radiographic Studies  CT Abdomen Pelvis Stone Protocol  Result Date: 3/7/2025  No hydronephrosis or nephrolithiasis.     CTDI: 3.75 mGy DLP:178.19 mGy.cm  This report was signed and finalized on 3/7/2025 7:34 AM by Gabriella Frank MD.      US Renal Bilateral  Result Date: 12/10/2024  Mild prominence of the left renal pelvis as above.      Images were reviewed, interpreted, and dictated by Dr. Gabriella Frank MD Transcribed by Clare Sandoval PA-C.  This report was signed and finalized on 12/10/2024 9:44 AM by Gabriella Frank MD.        I have reviewed the above labs and imaging.       Assessment / Plan      Diagnoses and all orders for this visit:    1. Nephrolithiasis (Primary)  -     XR Abdomen KUB; Future    2. Disorder of prostate  -     PSA DIAGNOSTIC         Assessment & Plan  1. Nephrolithiasis: Stable. CT scan shows no hydronephrosis or kidney stones.  - Maintain hydration (four 16-ounce bottles daily)  - Minimize salt intake  - Monitor meat protein consumption  - Discussed the option of a metabolic workup ([facility]) to identify urinary factors contributing to stone formation- patient currently not intrested  - Ordered KUB x-ray for follow-up in one year  - Seek medical attention if symptoms suggestive of kidney stones occur before the follow-up    2. Prostate cancer screening.  - Annual PSA screening recommended starting at age 50  - Ordered PSA test today; results will be communicated via [employer] unless further discussion is needed    Follow-up  - Follow up in 1 year       Return in about 1 year (around 3/11/2026) for Qamar Martinez prior to next visit.    Ruben, MSN, APRN, FNP-C  [facility]    "

## 2025-03-17 LAB — PSA SERPL-MCNC: 0.57 NG/ML (ref 0–4)

## 2025-05-09 ENCOUNTER — HOSPITAL ENCOUNTER (EMERGENCY)
Facility: HOSPITAL | Age: 55
Discharge: HOME | End: 2025-05-09
Attending: EMERGENCY MEDICINE
Payer: COMMERCIAL

## 2025-05-09 VITALS
WEIGHT: 139 LBS | TEMPERATURE: 97.7 F | SYSTOLIC BLOOD PRESSURE: 146 MMHG | HEIGHT: 66 IN | BODY MASS INDEX: 22.34 KG/M2 | HEART RATE: 77 BPM | RESPIRATION RATE: 20 BRPM | OXYGEN SATURATION: 99 % | DIASTOLIC BLOOD PRESSURE: 98 MMHG

## 2025-05-09 DIAGNOSIS — S61.012A LACERATION OF LEFT THUMB WITHOUT FOREIGN BODY WITHOUT DAMAGE TO NAIL, INITIAL ENCOUNTER: Primary | ICD-10-CM

## 2025-05-09 PROCEDURE — 90715 TDAP VACCINE 7 YRS/> IM: CPT

## 2025-05-09 PROCEDURE — 25010000002 TETANUS-DIPHTH-ACELL PERTUSSIS 5-2.5-18.5 LF-MCG/0.5 SUSPENSION PREFILLED SYRINGE

## 2025-05-09 PROCEDURE — 25010000002 LIDOCAINE 2% SOLUTION

## 2025-05-09 PROCEDURE — 99282 EMERGENCY DEPT VISIT SF MDM: CPT | Performed by: EMERGENCY MEDICINE

## 2025-05-09 PROCEDURE — 90471 IMMUNIZATION ADMIN: CPT

## 2025-05-09 RX ORDER — LIDOCAINE HYDROCHLORIDE 20 MG/ML
10 INJECTION, SOLUTION INFILTRATION; PERINEURAL ONCE
Status: COMPLETED | OUTPATIENT
Start: 2025-05-09 | End: 2025-05-09

## 2025-05-09 RX ADMIN — LIDOCAINE HYDROCHLORIDE 10 ML: 20 INJECTION, SOLUTION INFILTRATION; PERINEURAL at 21:02

## 2025-05-09 RX ADMIN — TETANUS TOXOID, REDUCED DIPHTHERIA TOXOID AND ACELLULAR PERTUSSIS VACCINE, ADSORBED 0.5 ML: 5; 2.5; 8; 8; 2.5 SUSPENSION INTRAMUSCULAR at 20:33

## 2025-05-10 NOTE — DISCHARGE INSTRUCTIONS
Keep the wound clean and dry for the next 24 hours after that once daily showering is permissible until sutures are removed.  Sutures should be removed in 10 to 14 days.  Return to the ER for any signs of wound is becoming infected such as swelling of the skin redness to the area or drainage of pus from the wound.

## 2025-05-10 NOTE — ED PROVIDER NOTES
EMERGENCY DEPARTMENT ENCOUNTER    Pt Name: Phani Paulson  MRN: [id number]  Pt :   1970  Room Number:  19SF/19  Date of encounter:  2025  PCP: Annabelle Jarvis MD  ED Provider: Aki Reilly PA-C    Historian: Patient, wife at bedside, nursing notes      HPI:  Chief Complaint: Left thumb laceration        Context: Phani Paulson is a 55 y.o. male who presents to the ED c/o laceration to his left thumb sustained when he accidentally cut his hand with a  at home.  Patient states he is not unsure of his date of last tetanus shot.  He denies any other injury today.  Patient denies history of diabetes.  Patient reports he is right-hand dominant.      PAST MEDICAL HISTORY  Past Medical History:   Diagnosis Date    Acute suppurative otitis media of both ears without spontaneous rupture of tympanic membranes     Allergic     Chicken pox     Esophageal stricture     GERD (gastroesophageal reflux disease)     Kidney stone 2024    Lesion of skin of face     benign    Seasonal allergies     Wears glasses          PAST SURGICAL HISTORY  Past Surgical History:   Procedure Laterality Date    COLONOSCOPY      6yrs     COLONOSCOPY N/A 10/07/2022    Procedure: COLONOSCOPY WITH POLYPECTOMY X1;  Surgeon: Manuel Ann MD;  Location: [facility] ENDOSCOPY;  Service: Gastroenterology;  Laterality: N/A;    ENDOSCOPY N/A 06/15/2020    Procedure: ESOPHAGOGASTRODUODENOSCOPY WITH BIOPSY AND DILATATION;  Surgeon: Manuel Ann MD;  Location: [facility] ENDOSCOPY;  Service: Gastroenterology;  Laterality: N/A;    ENDOSCOPY N/A 2020    Procedure: ESOPHAGOGASTRODUODENOSCOPY WITH BIOPSY;  Surgeon: Manuel Ann MD;  Location: [facility] ENDOSCOPY;  Service: Gastroenterology;  Laterality: N/A;    ENDOSCOPY N/A 10/30/2020    Procedure: ESOPHAGOGASTRODUODENOSCOPY WITH COLD FORCEP BIOPSY;  Surgeon: Manuel Ann MD;  Location: [facility] ENDOSCOPY;  Service: Gastroenterology;  Laterality:  N/A;    ENDOSCOPY N/A 10/07/2022    Procedure: ESOPHAGOGASTRODUODENOSCOPY WITH BIOPSY;  Surgeon: Manuel Ann MD;  Location: [facility] ENDOSCOPY;  Service: Gastroenterology;  Laterality: N/A;    KIDNEY STONE SURGERY N/A 2024    SKIN BIOPSY      face-benign         FAMILY HISTORY  Family History   Problem Relation Age of Onset    Colon cancer Mother     Hypertension Mother     Cancer Mother     Skin cancer Father     Cancer Maternal Grandfather     Cancer Paternal Grandfather         prostate    Cirrhosis Neg Hx     Liver cancer Neg Hx     Liver disease Neg Hx          SOCIAL HISTORY  Social History     Socioeconomic History    Marital status:    Tobacco Use    Smoking status: Never     Passive exposure: Never    Smokeless tobacco: Never   Vaping Use    Vaping status: Never Used   Substance and Sexual Activity    Alcohol use: No    Drug use: Never    Sexual activity: Never         ALLERGIES  Patient has no known allergies.        REVIEW OF SYSTEMS  Review of Systems   Constitutional:  Negative for chills and fever.   HENT:  Negative for congestion and sore throat.    Respiratory:  Negative for cough and shortness of breath.    Cardiovascular:  Negative for chest pain.   Gastrointestinal:  Negative for abdominal pain, nausea and vomiting.   Genitourinary:  Negative for dysuria.   Musculoskeletal:  Negative for back pain.   Skin:  Positive for wound.   Neurological:  Negative for dizziness and headaches.   Psychiatric/Behavioral:  Negative for confusion.    All other systems reviewed and are negative.         All systems reviewed and negative except for those discussed in HPI.       PHYSICAL EXAM    I have reviewed the triage vital signs and nursing notes.    ED Triage Vitals [05/09/25 2012]   Temp Heart Rate Resp BP SpO2   97.7 °F (36.5 °C) 77 20 146/98 99 %      Temp src Heart Rate Source Patient Position BP Location FiO2 (%)   Oral Monitor Sitting Left arm --       Physical Exam  Vitals and nursing  note reviewed.   Constitutional:       General: He is not in acute distress.     Appearance: Normal appearance. He is not ill-appearing, toxic-appearing or diaphoretic.   HENT:      Head: Normocephalic and atraumatic.      Right Ear: External ear normal.      Left Ear: External ear normal.      Nose: No congestion or rhinorrhea.      Mouth/Throat:      Mouth: Mucous membranes are moist.      Pharynx: Oropharynx is clear.   Eyes:      Conjunctiva/sclera: Conjunctivae normal.   Cardiovascular:      Rate and Rhythm: Normal rate.      Heart sounds: Normal heart sounds.   Pulmonary:      Effort: Pulmonary effort is normal. No respiratory distress.      Breath sounds: Normal breath sounds. No wheezing.   Abdominal:      Tenderness: There is no abdominal tenderness.   Musculoskeletal:      Cervical back: Normal range of motion and neck supple.      Right lower leg: No edema.      Left lower leg: No edema.   Skin:     Findings: Laceration present. No rash.          Neurological:      Mental Status: He is alert.             LAB RESULTS  No results found for this or any previous visit (from the past 24 hours).    If labs were ordered, I independently reviewed the results and considered them in treating the patient.        RADIOLOGY  No Radiology Exams Resulted Within Past 24 Hours      See radiologist's dictation for official interpretation.        PROCEDURES    Laceration Repair    Date/Time: 5/9/2025 9:21 PM    Performed by: Aki Reilly PA-C  Authorized by: Ronan Duke MD    Consent:     Consent obtained:  Verbal    Consent given by:  Patient    Risks, benefits, and alternatives were discussed: yes      Risks discussed:  Infection, need for additional repair, nerve damage, poor wound healing, poor cosmetic result, pain, retained foreign body, tendon damage and vascular damage    Alternatives discussed:  No treatment  Universal protocol:     Procedure explained and questions answered to patient or proxy's  satisfaction: yes      Imaging studies available: yes      Immediately prior to procedure, a time out was called: yes      Patient identity confirmed:  Arm band  Anesthesia:     Anesthesia method:  Local infiltration    Local anesthetic:  Lidocaine 2% w/o epi  Laceration details:     Location:  Finger    Finger location:  L thumb    Length (cm):  2.5    Depth (mm):  2  Pre-procedure details:     Preparation:  Patient was prepped and draped in usual sterile fashion  Exploration:     Hemostasis achieved with:  Direct pressure    Imaging outcome: foreign body not noted      Contaminated: no    Treatment:     Area cleansed with:  Chlorhexidine    Amount of cleaning:  Standard    Irrigation solution:  Sterile saline    Irrigation volume:  1000 ml    Irrigation method:  Pressure wash  Skin repair:     Repair method:  Sutures    Suture size:  5-0    Suture material:  Nylon    Suture technique:  Simple interrupted    Number of sutures:  3  Approximation:     Approximation:  Close  Repair type:     Repair type:  Simple  Post-procedure details:     Dressing:  Open (no dressing)    Procedure completion:  Tolerated well, no immediate complications      No orders to display       MEDICATIONS GIVEN IN ER    Medications   Tetanus-Diphth-Acell Pertussis (BOOSTRIX) injection 0.5 mL (0.5 mL Intramuscular Given 5/9/25 2033)   lidocaine (XYLOCAINE) 2% injection 10 mL (10 mL Injection Given by Other 5/9/25 2102)         MEDICAL DECISION MAKING, PROGRESS, and CONSULTS    All labs, if obtained, have been independently reviewed by me.  All radiology studies, if obtained, have been reviewed by me and the radiologist dictating the report.  All EKG's, if obtained, have been independently viewed and interpreted by me/my attending physician.      Discussion below represents my analysis of pertinent findings related to patient's condition, differential diagnosis, treatment plan and final disposition.    Patient is a 55-year-old male presenting  to ER today for evaluation of laceration sustained to the left thumb.  On evaluation patient has a superficial but gaping laceration over the dorsal aspect of the left thumb midway between PIP and DIP joints.  He has full range of motion intact of the left thumb, the left upper extremity is neurovascularly intact with a bounding radial pulse warm well-perfused hand and normal capillary refill.  No clinical suspicion for underlying fracture or foreign body therefore imaging was not deemed necessary at this time.  Tetanus shot updated in the ED.  Laceration repair procedure performed myself successfully no complications.  Strict ED return precautions were explained and patient verbalized understanding of and agreement this plan of care.                       Differential diagnosis:    Differential diagnosis included but was not limited to laceration, abrasion, foreign body      Additional sources:    - Discussed/ obtained information from independent historians: Wife at bedside    - External (non-ED) record review: Previous medical records reviewed    - Chronic or social conditions impacting care: None    Orders placed during this visit:  Orders Placed This Encounter   Procedures    Laceration Repair         Additional orders considered but not ordered: None      ED Course:    Consultants: None                Shared Decision Making:  After my consideration of clinical presentation and any laboratory/radiology studies obtained, I discussed the findings with the patient/patient representative who is in agreement with the treatment plan and the final disposition.   Risks and benefits of discharge and/or observation/admission were discussed.       AS OF 21:21 EDT VITALS:    BP - 146/98  HR - 77  TEMP - 97.7 °F (36.5 °C) (Oral)  O2 SATS - 99%                  DIAGNOSIS  Final diagnoses:   Laceration of left thumb without foreign body without damage to nail, initial encounter         DISPOSITION  Discharge      Please note  that portions of this document were completed with voice recognition software.      Aki Reilly PA-C  05/10/25 0121

## 2025-07-21 ENCOUNTER — OFFICE VISIT (OUTPATIENT)
Dept: INTERNAL MEDICINE | Facility: CLINIC | Age: 55
End: 2025-07-21
Payer: COMMERCIAL

## 2025-07-21 VITALS
BODY MASS INDEX: 22.34 KG/M2 | SYSTOLIC BLOOD PRESSURE: 126 MMHG | HEART RATE: 71 BPM | TEMPERATURE: 99.7 F | DIASTOLIC BLOOD PRESSURE: 83 MMHG | HEIGHT: 66 IN | RESPIRATION RATE: 16 BRPM | WEIGHT: 139 LBS | OXYGEN SATURATION: 99 %

## 2025-07-21 DIAGNOSIS — Z00.00 ROUTINE GENERAL MEDICAL EXAMINATION AT A HEALTH CARE FACILITY: Primary | ICD-10-CM

## 2025-07-21 DIAGNOSIS — Z23 NEED FOR PNEUMOCOCCAL VACCINATION: ICD-10-CM

## 2025-07-21 PROCEDURE — 99396 PREV VISIT EST AGE 40-64: CPT | Performed by: INTERNAL MEDICINE

## 2025-07-21 PROCEDURE — 90471 IMMUNIZATION ADMIN: CPT | Performed by: INTERNAL MEDICINE

## 2025-07-21 PROCEDURE — 90684 PCV21 VACCINE IM: CPT | Performed by: INTERNAL MEDICINE

## 2025-07-21 NOTE — PROGRESS NOTES
Subjective   Phani Paulson is a 55 y.o. male.     Chief Complaint   Patient presents with    Annual Exam     Physical       History of Present Illness   Patient is here for a physical and due for labs and pneumonia vaccine      Past Medical History:   Diagnosis Date    Acute suppurative otitis media of both ears without spontaneous rupture of tympanic membranes     Allergic     Chicken pox     Esophageal stricture     GERD (gastroesophageal reflux disease)     Kidney stone 08/07/2024    Lesion of skin of face     benign    Seasonal allergies     Wears glasses        Past Surgical History:   Procedure Laterality Date    COLONOSCOPY      6yrs     COLONOSCOPY N/A 10/07/2022    Procedure: COLONOSCOPY WITH POLYPECTOMY X1;  Surgeon: Manuel Ann MD;  Location: [facility] ENDOSCOPY;  Service: Gastroenterology;  Laterality: N/A;    ENDOSCOPY N/A 06/15/2020    Procedure: ESOPHAGOGASTRODUODENOSCOPY WITH BIOPSY AND DILATATION;  Surgeon: Manuel Ann MD;  Location: [facility] ENDOSCOPY;  Service: Gastroenterology;  Laterality: N/A;    ENDOSCOPY N/A 08/21/2020    Procedure: ESOPHAGOGASTRODUODENOSCOPY WITH BIOPSY;  Surgeon: Manuel Ann MD;  Location: [facility] ENDOSCOPY;  Service: Gastroenterology;  Laterality: N/A;    ENDOSCOPY N/A 10/30/2020    Procedure: ESOPHAGOGASTRODUODENOSCOPY WITH COLD FORCEP BIOPSY;  Surgeon: Manuel Ann MD;  Location: [facility] ENDOSCOPY;  Service: Gastroenterology;  Laterality: N/A;    ENDOSCOPY N/A 10/07/2022    Procedure: ESOPHAGOGASTRODUODENOSCOPY WITH BIOPSY;  Surgeon: Manuel Ann MD;  Location: [facility] ENDOSCOPY;  Service: Gastroenterology;  Laterality: N/A;    KIDNEY STONE SURGERY N/A September 17th, 2024    SKIN BIOPSY      face-benign       Family History   Problem Relation Age of Onset    Colon cancer Mother     Hypertension Mother     Cancer Mother         Colon cancer.    Skin cancer Father     Cancer Maternal Grandfather         Prostate cancer.  "   Cancer Paternal Grandfather         prostate    Prostate cancer Paternal Grandfather         Prostate cancer 1988.    Colon cancer Paternal Grandfather     Cirrhosis Neg Hx     Liver cancer Neg Hx     Liver disease Neg Hx         reports that he has never smoked. He has never been exposed to tobacco smoke. He has never used smokeless tobacco. He reports that he does not drink alcohol and does not use drugs.    No Known Allergies        Current Outpatient Medications:     Loratadine 10 MG capsule, Take 1 capsule by mouth Daily., Disp: , Rfl:     pantoprazole (PROTONIX) 20 MG EC tablet, TAKE 1 TABLET DAILY, Disp: 90 tablet, Rfl: 3    valACYclovir (VALTREX) 500 MG tablet, Take 1 tablet by mouth Every 12 (Twelve) Hours., Disp: , Rfl:       Objective   Blood pressure 126/83, pulse 71, temperature 99.7 °F (37.6 °C), temperature source Infrared, resp. rate 16, height 167.6 cm (65.98\"), weight 63 kg (139 lb), SpO2 99%.    Physical Exam  Vitals and nursing note reviewed.   Constitutional:       General: He is not in acute distress.     Appearance: Normal appearance. He is not diaphoretic.   HENT:      Head: Normocephalic and atraumatic.      Right Ear: External ear normal.      Left Ear: External ear normal.      Nose: Nose normal.   Eyes:      Extraocular Movements: Extraocular movements intact.      Conjunctiva/sclera: Conjunctivae normal.   Neck:      Trachea: Trachea normal.   Cardiovascular:      Rate and Rhythm: Normal rate and regular rhythm.      Heart sounds: Normal heart sounds.   Pulmonary:      Effort: Pulmonary effort is normal. No respiratory distress.   Abdominal:      General: Abdomen is flat.   Musculoskeletal:      Cervical back: Neck supple.      Comments: Moves all limbs   Skin:     General: Skin is warm and dry.      Findings: No erythema.   Neurological:      Mental Status: He is alert and oriented to person, place, and time.      Comments: No gross motor or sensory deficits         BMI is within " normal parameters. No other follow-up for BMI required.      Results for orders placed or performed in visit on 03/11/25   PSA DIAGNOSTIC    Collection Time: 03/11/25 12:00 AM   Result Value Ref Range    PSA 0.568 0.000 - 4.000 ng/mL         Assessment & Plan   Diagnoses and all orders for this visit:    1. Routine general medical examination at a health care facility (Primary)  -     CBC (No Diff)  -     Comprehensive Metabolic Panel  -     Lipid Panel  -     Hemoglobin A1c  -     TSH  -     Vitamin B12    2. Need for pneumococcal vaccination    Other orders  -     Pneumococcal Conjugate Vaccine 21-Valent All    plan:  1.physical: Physical exam conducted today,  Will obtain fasting lab work,   Impression: healthy adult Patient. Currently, patient eats a healthy diet. Screening lab work includes glucose, lipid profile , complete blood count and comprehensive panel . The risks and benefits of immunizations were discussed and immunizations are up to date. Advice and education were given regarding nutrition and aerobic exercise. Patient discussion: discussed with the patient.  Physical with preventive exam as well as age and risk appropriate counseling completed.   Patient Discussion: plan discussed with the patient, follow-up visit needed in one year. Medication Review and medication list updated  Advised  regular weight-bearing exercise  2. Need for  pneumonia vaccine : given today and well tolerated               Annabelle Jarvis MD

## 2025-07-21 NOTE — LETTER
[facility]  Vaccine Consent Form    Patient Name:  Phani Paulson  Patient :  1970     Vaccine(s) Ordered    Pneumococcal Conjugate Vaccine 21-Valent All        Screening Checklist  The following questions should be completed prior to vaccination. If you answer “yes” to any question, it does not necessarily mean you should not be vaccinated. It just means we may need to clarify or ask more questions. If a question is unclear, please ask your healthcare provider to explain it.    Yes No   Any fever or moderate to severe illness today (mild illness and/or antibiotic treatment are not contraindications)?     Do you have a history of a serious reaction to any previous vaccinations, such as anaphylaxis, encephalopathy within 7 days, Guillain-[place] syndrome within 6 weeks, seizure?     Have you received any live vaccine(s) (e.g MMR, ALEX) or any other vaccines in the last month (to ensure duplicate doses aren't given)?     Do you have an anaphylactic allergy to latex (DTaP, DTaP-IPV, Hep A, Hep B, MenB, RV, Td, Tdap), baker’s yeast (Hep B, HPV), polysorbates (RSV, nirsevimab, PCV 20 and 21, Rotavirrus, Tdap, Shingrix), or gelatin (ALEX, MMR)?     Do you have an anaphylactic allergy to neomycin (Rabies, ALEX, MMR, IPV, Hep A), polymyxin B (IPV), or streptomycin (IPV)?      Any cancer, leukemia, AIDS, or other immune system disorder? (ALEX, MMR, RV)     Do you have a parent, brother, or sister with an immune system problem (if immune competence of vaccine recipient clinically verified, can proceed)? (MMR, ALEX)     Any recent steroid treatments for >2 weeks, chemotherapy, or radiation treatment? (ALEX, MMR)     Have you received antibody-containing blood transfusions or IVIG in the past 11 months (recommended interval is dependent on product)? (MMR, ALEX)     Have you taken antiviral drugs (acyclovir, famciclovir, valacyclovir for ALEX) in the last 24 or 48 hours, respectively?      Are you pregnant or planning to  "become pregnant within 1 month? (ALEX, MMR, HPV, IPV, MenB, Abrexvy; For Hep B- refer to Engerix-B; For RSV - Abrysvo is indicated for 32-36 weeks of pregnancy from September to January)     For infants, have you ever been told your child has had intussusception or a medical emergency involving obstruction of the intestine (Rotavirus)? If not for an infant, can skip this question.         *Ordering Physicians/APC should be consulted if \"yes\" is checked by the patient or guardian above.  I have received, read, and understand the Vaccine Information Statement (VIS) for each vaccine ordered.  I have considered my or my child's health status as well as the health status of my close contacts.  I have taken the opportunity to discuss my vaccine questions with my or my child's health care provider.   I have requested that the ordered vaccine(s) be given to me or my child.  I understand the benefits and risks of the vaccines.  I understand that I should remain in the clinic for 15 minutes after receiving the vaccine(s).  _________________________________________________________  Signature of Patient or Parent/Legal Guardian ____________________  Date   "

## 2025-07-22 LAB
ALBUMIN SERPL-MCNC: 4.5 G/DL (ref 3.5–5.2)
ALBUMIN/GLOB SERPL: 1.8 G/DL
ALP SERPL-CCNC: 64 U/L (ref 39–117)
ALT SERPL-CCNC: 14 U/L (ref 1–41)
AST SERPL-CCNC: 21 U/L (ref 1–40)
BILIRUB SERPL-MCNC: 0.4 MG/DL (ref 0–1.2)
BUN SERPL-MCNC: 14 MG/DL (ref 6–20)
BUN/CREAT SERPL: 14.4 (ref 7–25)
CALCIUM SERPL-MCNC: 9.5 MG/DL (ref 8.6–10.5)
CHLORIDE SERPL-SCNC: 106 MMOL/L (ref 98–107)
CHOLEST SERPL-MCNC: 171 MG/DL (ref 0–200)
CO2 SERPL-SCNC: 26 MMOL/L (ref 22–29)
CREAT SERPL-MCNC: 0.97 MG/DL (ref 0.76–1.27)
EGFRCR SERPLBLD CKD-EPI 2021: 93.3 ML/MIN/1.73
ERYTHROCYTE [DISTWIDTH] IN BLOOD BY AUTOMATED COUNT: 12.5 % (ref 12.3–15.4)
GLOBULIN SER CALC-MCNC: 2.5 GM/DL
GLUCOSE SERPL-MCNC: 92 MG/DL (ref 65–99)
HBA1C MFR BLD: 5.3 % (ref 4.8–5.6)
HCT VFR BLD AUTO: 50.3 % (ref 37.5–51)
HDLC SERPL-MCNC: 59 MG/DL (ref 40–60)
HGB BLD-MCNC: 17.1 G/DL (ref 13–17.7)
LDLC SERPL CALC-MCNC: 103 MG/DL (ref 0–100)
MCH RBC QN AUTO: 30 PG (ref 26.6–33)
MCHC RBC AUTO-ENTMCNC: 34 G/DL (ref 31.5–35.7)
MCV RBC AUTO: 88.2 FL (ref 79–97)
PLATELET # BLD AUTO: 309 10*3/MM3 (ref 140–450)
POTASSIUM SERPL-SCNC: 4.9 MMOL/L (ref 3.5–5.2)
PROT SERPL-MCNC: 7 G/DL (ref 6–8.5)
RBC # BLD AUTO: 5.7 10*6/MM3 (ref 4.14–5.8)
SODIUM SERPL-SCNC: 140 MMOL/L (ref 136–145)
TRIGL SERPL-MCNC: 46 MG/DL (ref 0–150)
TSH SERPL DL<=0.005 MIU/L-ACNC: 1.01 UIU/ML (ref 0.27–4.2)
VIT B12 SERPL-MCNC: 331 PG/ML (ref 211–946)
VLDLC SERPL CALC-MCNC: 9 MG/DL (ref 5–40)
WBC # BLD AUTO: 5.64 10*3/MM3 (ref 3.4–10.8)

## 2025-07-28 RX ORDER — VALACYCLOVIR HYDROCHLORIDE 500 MG/1
500 TABLET, FILM COATED ORAL EVERY 12 HOURS SCHEDULED
Qty: 90 TABLET | Refills: 0 | OUTPATIENT
Start: 2025-07-28

## 2025-07-28 NOTE — TELEPHONE ENCOUNTER
Rx Refill Note  Requested Prescriptions     Pending Prescriptions Disp Refills    valACYclovir (VALTREX) 500 MG tablet       Sig: Take 1 tablet by mouth Every 12 (Twelve) Hours.      Last office visit with prescribing clinician: 7/21/2025   Last telemedicine visit with prescribing clinician: Visit date not found   Next office visit with prescribing clinician: 7/27/2026                         Would you like a call back once the refill request has been completed: [] Yes [] No    If the office needs to give you a call back, can they leave a voicemail: [] Yes [] No    William Reza MA  07/28/25, 11:46 EDT

## (undated) DEVICE — SUCTION CANISTER, 1500CC, RIGID: Brand: DEROYAL

## (undated) DEVICE — ENDOSCOPY PORT CONNECTOR FOR OLYMPUS® SCOPES: Brand: ERBE

## (undated) DEVICE — Device

## (undated) DEVICE — LUBE JELLY PK/2.75GM STRL BX/144

## (undated) DEVICE — VLV SXN AIR/H2O ORCAPOD3 1P/U STRL

## (undated) DEVICE — HYBRID TUBING/CAP SET FOR OLYMPUS® SCOPES: Brand: ERBE

## (undated) DEVICE — CONMED SCOPE SAVER BITE BLOCK, 20X27 MM: Brand: SCOPE SAVER

## (undated) DEVICE — FRCP BX RADJAW4 NDL 2.8 240 STD OG

## (undated) DEVICE — FRCP BIOP COLD ENDOJAW ALLGTR W/NDL 2.8X2300MM BLU